# Patient Record
Sex: MALE | Race: WHITE | NOT HISPANIC OR LATINO | Employment: OTHER | URBAN - METROPOLITAN AREA
[De-identification: names, ages, dates, MRNs, and addresses within clinical notes are randomized per-mention and may not be internally consistent; named-entity substitution may affect disease eponyms.]

---

## 2017-12-06 ENCOUNTER — TELEPHONE (OUTPATIENT)
Dept: NEUROLOGY | Facility: CLINIC | Age: 67
End: 2017-12-06

## 2017-12-06 NOTE — TELEPHONE ENCOUNTER
Called patient to reschedule. Patient was scheduled on the wrong day. Patient expressed understanding

## 2018-01-24 ENCOUNTER — TELEPHONE (OUTPATIENT)
Dept: NEUROLOGY | Facility: CLINIC | Age: 68
End: 2018-01-24

## 2018-01-24 NOTE — TELEPHONE ENCOUNTER
----- Message from Kimberlee Cedillo sent at 1/24/2018  9:20 AM CST -----  Contact: Patient's wife, Sabra  Patient's wife is calling to see if patient's appointment on 1/25 could possibly get rescheduled for next month because the patient has the flu.  Please call to reschedule.  Call Back#[phone] or   Thanks

## 2018-03-10 PROBLEM — A41.9 SEPSIS SECONDARY TO UTI: Status: ACTIVE | Noted: 2018-03-10

## 2018-03-10 PROBLEM — N39.0 SEPSIS SECONDARY TO UTI: Status: ACTIVE | Noted: 2018-03-10

## 2018-03-11 PROBLEM — R10.31 RIGHT LOWER QUADRANT ABDOMINAL PAIN: Status: ACTIVE | Noted: 2018-03-11

## 2018-03-11 PROBLEM — R10.9 FLANK PAIN, ACUTE: Status: ACTIVE | Noted: 2018-03-11

## 2018-03-12 PROBLEM — R39.14 BENIGN PROSTATIC HYPERPLASIA WITH INCOMPLETE BLADDER EMPTYING: Status: ACTIVE | Noted: 2018-03-12

## 2018-03-12 PROBLEM — R33.8 ACUTE URINARY RETENTION: Status: ACTIVE | Noted: 2018-03-12

## 2018-03-12 PROBLEM — N40.1 BENIGN PROSTATIC HYPERPLASIA WITH INCOMPLETE BLADDER EMPTYING: Status: ACTIVE | Noted: 2018-03-12

## 2018-03-13 PROBLEM — N40.1 BENIGN PROSTATIC HYPERPLASIA WITH INCOMPLETE BLADDER EMPTYING: Status: ACTIVE | Noted: 2018-03-13

## 2018-03-13 PROBLEM — R33.8 ACUTE URINARY RETENTION: Status: ACTIVE | Noted: 2018-03-13

## 2018-03-13 PROBLEM — N41.0 ACUTE PROSTATITIS: Status: ACTIVE | Noted: 2018-03-13

## 2018-03-13 PROBLEM — R39.14 BENIGN PROSTATIC HYPERPLASIA WITH INCOMPLETE BLADDER EMPTYING: Status: ACTIVE | Noted: 2018-03-13

## 2018-03-27 ENCOUNTER — TELEPHONE (OUTPATIENT)
Dept: NEUROLOGY | Facility: CLINIC | Age: 68
End: 2018-03-27

## 2018-03-27 ENCOUNTER — OFFICE VISIT (OUTPATIENT)
Dept: NEUROLOGY | Facility: CLINIC | Age: 68
End: 2018-03-27
Payer: MEDICARE

## 2018-03-27 VITALS
HEART RATE: 73 BPM | WEIGHT: 265.88 LBS | SYSTOLIC BLOOD PRESSURE: 140 MMHG | RESPIRATION RATE: 18 BRPM | HEIGHT: 72 IN | DIASTOLIC BLOOD PRESSURE: 85 MMHG | BODY MASS INDEX: 36.01 KG/M2

## 2018-03-27 DIAGNOSIS — I10 ESSENTIAL HYPERTENSION: ICD-10-CM

## 2018-03-27 DIAGNOSIS — M54.81 OCCIPITAL NEURALGIA OF LEFT SIDE: ICD-10-CM

## 2018-03-27 DIAGNOSIS — G44.1 VASCULAR HEADACHE, NOT ELSEWHERE CLASSIFIED: ICD-10-CM

## 2018-03-27 DIAGNOSIS — E11.9 DIABETES MELLITUS, STABLE: ICD-10-CM

## 2018-03-27 DIAGNOSIS — Z95.1 H/O HEART BYPASS SURGERY: ICD-10-CM

## 2018-03-27 DIAGNOSIS — G43.019 INTRACTABLE MIGRAINE WITHOUT AURA AND WITHOUT STATUS MIGRAINOSUS: ICD-10-CM

## 2018-03-27 DIAGNOSIS — M47.812 SPONDYLOSIS WITHOUT MYELOPATHY OR RADICULOPATHY, CERVICAL REGION: ICD-10-CM

## 2018-03-27 DIAGNOSIS — Z95.1 STATUS POST CARDIAC REVASCULARIZATION WITH BYPASS AORTOCORONARY ANASTOMOSIS OF FIVE CORONARY VESSELS: ICD-10-CM

## 2018-03-27 PROCEDURE — 99205 OFFICE O/P NEW HI 60 MIN: CPT | Mod: S$GLB,,, | Performed by: PSYCHIATRY & NEUROLOGY

## 2018-03-27 PROCEDURE — 3079F DIAST BP 80-89 MM HG: CPT | Mod: CPTII,S$GLB,, | Performed by: PSYCHIATRY & NEUROLOGY

## 2018-03-27 PROCEDURE — 99999 PR PBB SHADOW E&M-EST. PATIENT-LVL III: CPT | Mod: PBBFAC,,, | Performed by: PSYCHIATRY & NEUROLOGY

## 2018-03-27 PROCEDURE — 99499 UNLISTED E&M SERVICE: CPT | Mod: S$GLB,,, | Performed by: PSYCHIATRY & NEUROLOGY

## 2018-03-27 PROCEDURE — 3077F SYST BP >= 140 MM HG: CPT | Mod: CPTII,S$GLB,, | Performed by: PSYCHIATRY & NEUROLOGY

## 2018-03-27 RX ORDER — METOPROLOL SUCCINATE 100 MG/1
100 TABLET, EXTENDED RELEASE ORAL
Status: ON HOLD | COMMUNITY
Start: 2017-10-24 | End: 2018-09-04 | Stop reason: CLARIF

## 2018-03-27 RX ORDER — FUROSEMIDE 40 MG/1
40 TABLET ORAL
Status: ON HOLD | COMMUNITY
Start: 2017-09-07 | End: 2019-06-01 | Stop reason: HOSPADM

## 2018-03-27 RX ORDER — AMLODIPINE AND BENAZEPRIL HYDROCHLORIDE 10; 40 MG/1; MG/1
1 CAPSULE ORAL
Status: ON HOLD | COMMUNITY
Start: 2017-10-07 | End: 2019-06-01 | Stop reason: HOSPADM

## 2018-03-27 RX ORDER — OMEPRAZOLE 40 MG/1
40 CAPSULE, DELAYED RELEASE ORAL
Status: ON HOLD | COMMUNITY
Start: 2017-10-19 | End: 2018-09-04 | Stop reason: CLARIF

## 2018-03-27 RX ORDER — PRAVASTATIN SODIUM 80 MG/1
80 TABLET ORAL
COMMUNITY
Start: 2017-10-19 | End: 2020-05-15 | Stop reason: SDUPTHER

## 2018-03-27 RX ORDER — PROMETHAZINE HYDROCHLORIDE 25 MG/1
25 TABLET ORAL
COMMUNITY
Start: 2017-10-19 | End: 2018-05-25 | Stop reason: ALTCHOICE

## 2018-03-27 RX ORDER — LAMOTRIGINE 25 MG/1
TABLET ORAL
Qty: 120 TABLET | Refills: 11 | Status: SHIPPED | OUTPATIENT
Start: 2018-03-27 | End: 2019-07-18

## 2018-03-27 RX ORDER — HYDROCODONE BITARTRATE AND ACETAMINOPHEN 10; 325 MG/1; MG/1
TABLET ORAL
Refills: 0 | Status: ON HOLD | COMMUNITY
Start: 2018-03-20 | End: 2019-06-01 | Stop reason: HOSPADM

## 2018-03-27 RX ORDER — ETODOLAC 400 MG/1
400 TABLET, FILM COATED ORAL
Status: ON HOLD | COMMUNITY
Start: 2017-10-07 | End: 2019-06-01 | Stop reason: HOSPADM

## 2018-03-27 NOTE — TELEPHONE ENCOUNTER
Called speciality pharmacy in regards to pts transdermal cream. All questions and concerns addressed.

## 2018-03-27 NOTE — TELEPHONE ENCOUNTER
----- Message from Kimberlee Cedillo sent at 3/27/2018  4:33 PM CDT -----  Contact: Edilma with [facility] in [place], [place]  Pharmacy needs the CESAR number to fill patient's prescription for:  KGCL ketamine 10%- gabapentin 6%- cyclobenzaprine 2%- lidocaine 4% in transdermal cream.  Call Back#[phone]  (they are there until 5)  Thanks

## 2018-03-27 NOTE — PROGRESS NOTES
Headache questionnaire    1. When did your Headaches start?    1999      2. Where are your headaches located?   Left side head      3. Your headache's characteristics:   Throbbing, Pounding      4. How long does the headache last?   days      5. How often does the headache occur?   weekly      6. Are your headaches preceded or accompanied by other symptoms? no   If yes, please describe.  Pt did not answer      7. Does the headache awaken you at night? yes   If so, how often? Pt did not answer        8. Please cornelio the word that best describes your headache's intensity:    severe      9. Using a scale of 1 through 10, with 0 = no pain and 10 = the worst pain:   What score is your headache now? 5   What score is your headache at its worst? 10   What score is your headache at its best? 2        10. Possible associated headache symptoms:  []  Sensitivity to light  [x] Dizziness  [] Nasal or sinus pressure/ pain   [] Sensitivity to noise  [] Vertigo  [] Problems with concentration  [] Sensitivity to smells  [] Ringing in ears  [] Problems with memory    [] Blurred vision  [] Irritability  [] Problems with task completion   [] Double vision  [] Anger  [x]  Problems with relaxation  [] Loss of appetite  [] Anxiety  [x] Neck tightness, Neck pain  [x] Nausea   [] Nasal congestion  [x] Vomiting         11. Headache improving factors:  [] Sleep  [] Heat  [] Darkness  [x] Ice  [] Local pressure [] Menses (period)  [] Massage   [x] Medications:        12. Headache worsening factors:   [] Fatigue [] Sneezing  [] Changes in Weather  [] Light [] Bending Over [x] Stress  [] Noise [] Ovulation  [] Multiple Sclerosis Flare-Up  [] Smells  [] Menses  [] Food   [] Coughing [] Alcohol      13. Number of caffeinated drinks per day: pt did not answer      14. Number of diet drinks per day:  3      15. Have you seen any other [facility] within the last 3 years?  No    Please Check any Medications Tried for Headache    AED  Neuromodulators  MAOIs  Ergot Alkaloids    Acetazolamide (Diamox) [] Phenelzine (Nardil) [] Dihydroergotamine (Migranal) []   Carbamazepine (Tegretol) [] Tranylcypromine (Parnate) [] Ergotamine (Ergomar) []   Gabapentin (Neurontin) [x] Antihistamine/Serotonergic  Triptans    Lacosamide (Vimpat) [] Cyproheptadine (Periactin) [] Almotriptan (Axert) [x]   Lamotrigine (Lamictal) [] Antihypertensives  Eletriptan (Relpax) [x]   Levatiracetam (Keppra) [] Atenolol (Tenormin) [] Frovatriptan (Frova) []   Oxcarbazepine (Trileptal) [] Bisoprostol (Zebeta) [] Naratriptan (Amerge) []   Phenobarbital [] Candesartan (Atacand) [] Rizatriptan (Maxalt) [x]   Phenytoin (Dilantin) [] Diltiazem (Cardizem) [] Sumatriptan (Imitrex) [x]   Pregabalin (Lyrica) [x] Lisinopril (Prinivil, Zestril) [] Zolmitriptan (Zomig) []   Primidone (Mysoline) [] Metoprolol (Toprol) [x] Combo Abortives    Tiagabine (Gabatril) [] Nadolol (Corgard) [] Butalbital and Acetaminophen (Bupap) []   Topiramate (Topamax)  (Trokendi) [x] Nicardipine (Cardene) []     Vigabatrin (Sabril) [] Nimodipine (Nimotop) [] Butalbital, Acetaminophen, and caffeine (Fioricet) []   Valproic Acid (Depakote) (Divalproex Sodium) [] Propranolol (Inderal) [x]     Zonisamide (Zonegran) [] Telmisartan (Micardis) [] Butalbital, Aspirin, and caffeine (Fiorinal) []   Benzodiazepines  Timolol (Blocadren) []     Alprazolam (Xanax) [x] Verapamil (Calan, Verelan) [] Butalbital, Caffeine, Acetaminophen, and Codeine (Fioricet with Codeine) []   Diazepam (Valium) [x] NSAIDs      Lorazepam (Ativan) [x] Acetaminophen (Tylenol) []     Clonazepam (Klonopin) [] Acetylsalicylic Acid (Aspirin) [] Butalbital, Caffeine, Aspirin, and Codeine  (Fiorinal with Codeine) []   Antidepressants  Diclofenac (Cambia) []     Amitriptyline (Elavil) [] Ibuprofen (Motrin) []     Desipramine (Norpramin) [] Indomethacin (Indocin) [x] Aspirin, Caffeine, and Acetaminophen (Excedrin) (Goodys) [x]   Doxepin (Sinequan) []  Ketoprofen (Orudis) []     Fluoxetine (Prozac) [x] Ketorolac (Toradol) [] Acetaminophen, Dichloralphenazone, and Isometheptene (Midrin) []   Imipramine (Tofranil) [] Naproxen (Anaprox) (Aleve) []     Nortriptyline (Pamelor) [] Meclofenamic Acid (Meclomen) []     Venlafaxine (Effexor) [x] Meloxicam (Mobic) [] Aspirin, Salicylamide, and Caffeine (BC Powder) []

## 2018-03-27 NOTE — LETTER
March 27, 2018      Yann Cardona MD at [facility] [place]  [address] [facility] [address] [place] [address]  Phone: [phone]  Fax: [phone]          Patient: William Jackson Jr.   MR Number: [id number]   YOB: 1950   Date of Visit: 3/27/2018       Dear Dr. Yann Cardona:    Thank you for referring William Jackson to me for evaluation. Attached you will find relevant portions of my assessment and plan of care.    If you have questions, please do not hesitate to call me. I look forward to following William Jackson along with you.    Sincerely,    Garima Wilson MD    Enclosure  CC:  No Recipients    If you would like to receive this communication electronically, please contact [email or web address] or [phone] to request more information on [employer] access.    For providers and/or their staff who would like to refer a patient to [facility], please contact us through our one-stop-shop provider referral line, [facility], at [phone].    If you feel you have received this communication in error or would no longer like to receive these types of communications, please e-mail [email or web address]

## 2018-03-27 NOTE — PROGRESS NOTES
Subjective:       Patient ID: William Jackson Jr. is a 67 y.o. male.    Chief Complaint: Migraine    HPI  The patient is a pleasant 66 y/o male presenting with chief complaint of headache. He comes accompanied by his wife which contributes to the history. 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 He has positive family of migraine affecting his father and sister. He describes a strictly unilateral left sided pain affecting the occipital, and temporal region, throbbing and pounding in nature, associated with photophobia, nausea and vomiting with escalations. He denies autonomic symptoms. The duration is days and the frequency is about 2 times per week. He takes Lortab with Phenergan and goes to sleep. Occasionally, he cannot manage, he goes to the ED and receives Demerol/Phenergan and goes to sleep for 10 to 12 hours after which is improved. In the past he has tried multiple medications which are ineffective and the majority of cases associated with side effects.   PREVENTIVES tried:  Botox (2 treatments, made the headache worse)  Toprol XL  Inderal  Lexapro  Effexor XR  Elavil  Bupropion  Namenda  Cymbalta  Verapamil  Depakote  Topamax  Lyrica  Gabapentin  Norflex  ACUTE MEDICATIONS tried:  Imitrex  Relpax  Maxalt  Fioricet  Ketorolac  Indocin     Please see details of headache characteristics below.  Headache questionnaire     1. When did your Headaches start?               1999        2. Where are your headaches located?              Left side head        3. Your headache's characteristics:              Throbbing, Pounding        4. How long does the headache last?              days        5. How often does the headache occur?              weekly        6. Are your headaches preceded or accompanied by other symptoms? no              If yes, please describe.  Pt did not answer        7. Does the headache awaken you at night? yes               If so, how often? Pt did not answer           8. Please cornelio the word that best describes your headache's intensity:               severe        9. Using a scale of 1 through 10, with 0 = no pain and 10 = the worst pain:              What score is your headache now? 5              What score is your headache at its worst? 10              What score is your headache at its best? 2           10. Possible associated headache symptoms:  []  Sensitivity to light                  [x] Dizziness                    [] Nasal or sinus pressure/ pain              [] Sensitivity to noise                 [] Vertigo                        [] Problems with concentration  [] Sensitivity to smells   [] Ringing in ears           [] Problems with memory                        [] Blurred vision             [] Irritability                     [] Problems with task completion   [] Double vision             [] Anger              [x]  Problems with relaxation  [] Loss of appetite                     [] Anxiety                       [x] Neck tightness, Neck pain  [x] Nausea                                   [] Nasal congestion  [x] Vomiting                                         11. Headache improving factors:  [] Sleep              [] Heat  [] Darkness                    [x] Ice  [] Local pressure           [] Menses (period)  [] Massage                    [x] Medications:          12. Headache worsening factors:   [] Fatigue           [] Sneezing                    [] Changes in Weather  [] Light   [] Bending Over [x] Stress  [] Noise  [] Ovulation                    [] Multiple Sclerosis Flare-Up  [] Smells            [] Menses                      [] Food   [] Coughing        [] Alcohol        13. Number of caffeinated drinks per day: pt did not answer        14. Number of diet drinks per day:  3  Review of Systems   Constitutional: Negative for activity change, appetite change, chills, fatigue and fever.   HENT: Negative for  congestion, dental problem, ear pain, hearing loss, sinus pressure, tinnitus, trouble swallowing and voice change.    Eyes: Negative for photophobia, pain and visual disturbance.   Respiratory: Negative for cough, chest tightness and shortness of breath.    Cardiovascular: Negative for chest pain, palpitations and leg swelling.   Gastrointestinal: Positive for nausea. Negative for abdominal pain, blood in stool, constipation, diarrhea and vomiting.   Endocrine: Negative for cold intolerance and heat intolerance.   Genitourinary: Negative for difficulty urinating, dysuria and urgency.   Musculoskeletal: Negative for arthralgias, back pain, gait problem, myalgias, neck pain and neck stiffness.   Skin: Negative for color change, pallor and rash.   Neurological: Positive for headaches. Negative for dizziness, tremors, seizures, syncope, facial asymmetry, speech difficulty, weakness, light-headedness and numbness.   Hematological: Negative for adenopathy. Does not bruise/bleed easily.   Psychiatric/Behavioral: Negative for agitation, behavioral problems, confusion, decreased concentration, self-injury, sleep disturbance and suicidal ideas. The patient is not nervous/anxious.                Past Medical History:   Diagnosis Date    Anticoagulant long-term use     Arthritis     Claustrophobia     Coronary artery disease     Diabetes mellitus     H/O heart bypass surgery     Hypertension     Migraine headache     Sleep apnea      Past Surgical History:   Procedure Laterality Date    APPENDECTOMY      CARDIAC SURGERY      COLONOSCOPY N/A 5/12/2016    Procedure: COLONOSCOPY;  Surgeon: Larry Ortega MD;  Location: [facility];  Service: Endoscopy;  Laterality: N/A;    GASTRECTOMY      JOINT REPLACEMENT      knee replacement Bilateral     LAPAROSCOPIC GASTRIC BANDING       Family History   Problem Relation Age of Onset    Kidney disease Mother     Stroke Father      Social History     Social History     Marital status:      Spouse name: N/A    Number of children: N/A    Years of education: N/A     Occupational History    retired-heavy equipment      Social History Main Topics    Smoking status: Never Smoker    Smokeless tobacco: Never Used    Alcohol use No    Drug use: No    Sexual activity: Yes     Partners: Female     Other Topics Concern    Not on file     Social History Narrative         Review of patient's allergies indicates:   Allergen Reactions    Compazine [prochlorperazine] Itching    Indocin [indomethacin] Rash       Current Outpatient Prescriptions:     amlodipine-benazepril (LOTREL) 10-40 mg per capsule, Take 1 capsule by mouth., Disp: , Rfl:     aspirin 81 MG Chew, Take 325 mg by mouth once daily. , Disp: , Rfl:     b complex vitamins tablet, Take 1 tablet by mouth once daily., Disp: , Rfl:     ciprofloxacin HCl (CIPRO) 500 MG tablet, Take 1 tablet (500 mg total) by mouth 2 (two) times daily., Disp: 18 tablet, Rfl: 0    etodolac (LODINE) 400 MG tablet, Take 400 mg by mouth., Disp: , Rfl:     furosemide (LASIX) 40 MG tablet, Take 40 mg by mouth., Disp: , Rfl:     hydrocodone-acetaminophen 7.5-325mg (NORCO) 7.5-325 mg per tablet, Take 1 tablet by mouth every 6 (six) hours as needed for Pain., Disp: , Rfl:     metoprolol succinate (TOPROL-XL) 100 MG 24 hr tablet, Take 100 mg by mouth once daily., Disp: , Rfl:     metoprolol succinate (TOPROL-XL) 100 MG 24 hr tablet, Take 100 mg by mouth., Disp: , Rfl:     omeprazole (PRILOSEC) 40 MG capsule, Take 40 mg by mouth., Disp: , Rfl:     pravastatin (PRAVACHOL) 80 MG tablet, Take 80 mg by mouth., Disp: , Rfl:     promethazine (PHENERGAN) 25 MG tablet, Take 25 mg by mouth., Disp: , Rfl:     psyllium (METAMUCIL) packet, Take 1 packet by mouth once daily., Disp: , Rfl:     tamsulosin (FLOMAX) 0.4 mg Cp24, Take 1 capsule (0.4 mg total) by mouth once daily., Disp: 30 capsule, Rfl: 11    testosterone cypionate (DEPOTESTOTERONE  CYPIONATE) 100 mg/mL injection, Inject 200 mg into the muscle every 14 (fourteen) days., Disp: , Rfl:     amlodipine-benazepril (LOTREL) 10-40 mg per capsule, Take 1 capsule by mouth once daily., Disp: , Rfl:     etodolac (LODINE XL) 400 MG 24 hr tablet, Take 400 mg by mouth once daily., Disp: , Rfl:     furosemide (LASIX) 40 MG tablet, Take 40 mg by mouth once daily. , Disp: , Rfl:     hydrocodone-acetaminophen 10-325mg (NORCO)  mg Tab, TK 1 T PO  Q 4 H PRN P, Disp: , Rfl: 0    omeprazole (PRILOSEC) 40 MG capsule, Take 40 mg by mouth once daily., Disp: , Rfl:     pravastatin (PRAVACHOL) 80 MG tablet, Take 80 mg by mouth once daily., Disp: , Rfl:       Objective:      Vitals:    03/27/18 0800   BP: (!) 140/85   Pulse: 73   Resp: 18     Body mass index is 36.06 kg/m².    Physical Exam    Constitutional:     He appears well-developed and well-nourished. He is well groomed  HENT:    Head: Atraumatic, oral and nasal mucosa intact  Eyes: Conjunctivae and EOM are normal. Pupils are equal, round, and reactive to light OU. Smaller left palpebral fissure.  Neck: Neck supple. No thyromegaly present  Cardiovascular: Normal rate and normal heart sounds  No murmur heard  Pulmonary/Chest: Effort normal and breath sounds normal  Musculoskeletal: Poor posture with stooped head forward  Skin: Skin is warm and dry  Psychiatric: Normal mood and affect     Neuro exam:    Mental status:  Awake, attentive, Alert, oriented to self, place, year and month  Language function is intact    Cranial Nerves:  Smell was not formally evaluated  Cranial Nerves II - XII: intact  Pursuits were smooth, normal saccades, no nystagmus OU  Funduscopic exam - disc were flat and pink, no exudates or hemorrhages OU  Motor - facial movement was symmetrical and normal     Palate moved well and was symmetrical with normal palatal and oral sensation  Tongue movements were full    Coordination:     Rapid alternating movements and rapid finger tapping -  normal bilaterally  Finger to nose - normal and symmetric bilaterally   Arm roll - smooth and symmetric   No intentional or positional tremor.     Motor:  Normal muscle bulk and symmetry. No fasciculations were noted    No pronator drift  Strength of shoulder abduction, elbow flexion, wrist extension, elbow extension, finger flexion and hand intrinsics were 5/5 bilaterally    Strength of hip flexion, knee extension, knee flexion, ankle dorsiflexion, ankle plantarflexion were 5/5 bilaterally     Reflexes:  Tendon reflexes were 2 + at biceps, triceps, brachioradialis, patellar, and Achilles bilaterally  On plantar stimulation toes were down going bilaterally  No clonus was noted     Sensory: Intact to light touch, pin prick in all extremities.    Gait: Romberg absent. Normal gait. Good tandem    Review of Data:  Lab Results   Component Value Date     03/11/2016     03/13/2018    K 3.8 03/13/2018     03/13/2018    CO2 22 03/13/2018    BUN 11 03/13/2018    CREATININE 1.00 03/13/2018     (H) 03/13/2018    AST 20 03/13/2018    AST 43 05/11/2016    ALT 24 03/13/2018    ALBUMIN 3.7 03/13/2018    PROT 7.3 03/13/2018    BILITOT 0.6 03/13/2018       Lab Results   Component Value Date    WBC 8.75 03/13/2018    HGB 15.5 03/13/2018    HCT 46.7 03/13/2018    MCV 86 03/13/2018     03/13/2018               Assessment and Plan   The patient has suffered with chronic intractable headaches since 1999. He has seen at least 7 specialists but has been unable to find relief. He recalls responding to a left JUANY block. He has failed multiple medications (see above) as well as 2 rounds of Botox and cervical RFA. He has had at least 3 head CT, all negative.  He meets criteria for left sided chronic Occipital Neuralgia as well as Migraine without Aura. I have discussed the side effects of the medications prescribed and the patient acknowledges understanding.  He described a strong throbbing elements associated  with the occipital pain. Will get TCD to assess velocities with focus on the left side, ideally in the midst of an attack  Start Lamictal for prevention titrating slowly to an initial goal of 50 mg BID.  Start Compounded cream, KGCL to be applied to the affected area TID  Acute treatment is limited because of cardiac history. Will try Intranasal lidocaine to abort the attack.  Will block his left occipital nerve when in the midst of an attack  Will refer to Dr. Friedman for consideration for cryolysis of the left occipital nerve  Other considerations:  Sphenopalatine Ganglion block  Occipital Nerve Neuromodulation  CGRP monoclonal antibodies when available later this year    Counseling:  More than 50% of the 60 minute encounter was spent face to face counseling the patient regarding current status and future plan of care as well as side of the medications. All questions were answered to patient's satisfaction          Allan Wilson M.D  Medical Director, Headache and Facial Pain  [facility]

## 2018-03-27 NOTE — PROGRESS NOTES
Patient, William Jackson Jr. (MRN #[id number]), presented with a recorded BMI of 36.06 kg/m^2 and a documented comorbidity(s):  - Hypertension  to which the severe obesity is a contributing factor. This is consistent with the definition of severe obesity (BMI 35.0-35.9) with comorbidity (ICD-10 E66.01, Z68.35). The patient's severe obesity was monitored, evaluated, addressed and/or treated. This addendum to the medical record is made on 03/27/2018.

## 2018-03-28 ENCOUNTER — TELEPHONE (OUTPATIENT)
Dept: NEUROLOGY | Facility: CLINIC | Age: 68
End: 2018-03-28

## 2018-03-28 NOTE — TELEPHONE ENCOUNTER
----- Message from Presley Shipman sent at 3/28/2018 11:12 AM CDT -----  Contact: Wife/Sabra Montaño called in regarding the attached patient ().  Sabra stated that some pharmacy in [place] called them and stated they had a Rx for patient for some type of spray?  Sabra was confused about this because she did not remember hearing anything about the spray.  Sabra then stated that this pharmacy asked for $30 up front for postage??      Sabra's call back number is [phone] or patients is at [phone]

## 2018-03-29 ENCOUNTER — TELEPHONE (OUTPATIENT)
Dept: NEUROLOGY | Facility: CLINIC | Age: 68
End: 2018-03-29

## 2018-03-29 DIAGNOSIS — I67.2 CEREBRAL ATHEROSCLEROSIS: ICD-10-CM

## 2018-03-29 DIAGNOSIS — I73.9 VASOSPASM: Primary | ICD-10-CM

## 2018-03-29 NOTE — TELEPHONE ENCOUNTER
Called pt's wife to inform her that Transcranial Doppler was scheduled for Mr Thomas on April 9,2018 at 1:15pm. The Transcranial Doppler will be at [facility] [place] [facility] in Radiology. Pt did not answer, left voicemail

## 2018-03-29 NOTE — TELEPHONE ENCOUNTER
Called pt and informed him to disregard last messgae about the Transcranial Doppler being scheduled. I am working on scheduling the correct appointment and location. Pt verbalized understanding.

## 2018-03-29 NOTE — TELEPHONE ENCOUNTER
----- Message from RT Felice sent at 3/29/2018 10:31 AM CDT -----  Contact: pt    Called pod, pt , returned missed call, thanks.

## 2018-04-10 ENCOUNTER — TELEPHONE (OUTPATIENT)
Dept: NEUROLOGY | Facility: CLINIC | Age: 68
End: 2018-04-10

## 2018-04-10 RX ORDER — KETOROLAC TROMETHAMINE 30 MG/ML
60 INJECTION, SOLUTION INTRAMUSCULAR; INTRAVENOUS
Status: DISCONTINUED | OUTPATIENT
Start: 2018-04-11 | End: 2019-06-01 | Stop reason: HOSPADM

## 2018-04-10 NOTE — TELEPHONE ENCOUNTER
----- Message from Presley Shipman sent at 4/10/2018  1:33 PM CDT -----  Contact: Wife/Sabra  Unsuccessful call placed to office.  Sabra called in regarding the attached patient () and stated she was returning call to office about patients procedure that they are trying to schedule.  Sabra asked for call back after 4:30pm at [phone]

## 2018-04-10 NOTE — TELEPHONE ENCOUNTER
----- Message from Florinda Epstein sent at 4/10/2018  1:02 PM CDT -----  Contact: Sabra Manuel (Spouse)  Sabra is calling in regards to speaking to a nurse. Sabra can be reached at [phone]

## 2018-04-10 NOTE — TELEPHONE ENCOUNTER
Called pt and informed him to come into clinic tomorrow for a Nurse Visit for an IM injection. Pt confirmed he will be here. Pt verbalized understanding.

## 2018-04-11 ENCOUNTER — TELEPHONE (OUTPATIENT)
Dept: NEUROLOGY | Facility: CLINIC | Age: 68
End: 2018-04-11

## 2018-04-11 ENCOUNTER — CLINICAL SUPPORT (OUTPATIENT)
Dept: NEUROLOGY | Facility: CLINIC | Age: 68
End: 2018-04-11
Payer: MEDICARE

## 2018-04-11 VITALS
HEART RATE: 84 BPM | SYSTOLIC BLOOD PRESSURE: 170 MMHG | BODY MASS INDEX: 35.82 KG/M2 | DIASTOLIC BLOOD PRESSURE: 100 MMHG | WEIGHT: 264.44 LBS | RESPIRATION RATE: 18 BRPM | HEIGHT: 72 IN

## 2018-04-11 DIAGNOSIS — R11.0 NAUSEA: Primary | ICD-10-CM

## 2018-04-11 DIAGNOSIS — G43.009 MIGRAINE WITHOUT AURA AND WITHOUT STATUS MIGRAINOSUS, NOT INTRACTABLE: ICD-10-CM

## 2018-04-11 PROCEDURE — 96372 THER/PROPH/DIAG INJ SC/IM: CPT | Mod: S$GLB,,, | Performed by: PSYCHIATRY & NEUROLOGY

## 2018-04-11 PROCEDURE — 99999 PR PBB SHADOW E&M-EST. PATIENT-LVL IV: CPT | Mod: PBBFAC,,, | Performed by: PSYCHIATRY & NEUROLOGY

## 2018-04-11 RX ORDER — ONDANSETRON 2 MG/ML
8 INJECTION INTRAMUSCULAR; INTRAVENOUS
Status: COMPLETED | OUTPATIENT
Start: 2018-04-11 | End: 2018-04-11

## 2018-04-11 RX ORDER — KETOROLAC TROMETHAMINE 30 MG/ML
60 INJECTION, SOLUTION INTRAMUSCULAR; INTRAVENOUS
Status: COMPLETED | OUTPATIENT
Start: 2018-04-11 | End: 2018-04-11

## 2018-04-11 RX ADMIN — KETOROLAC TROMETHAMINE 60 MG: 30 INJECTION, SOLUTION INTRAMUSCULAR; INTRAVENOUS at 03:04

## 2018-04-11 RX ADMIN — ONDANSETRON 8 MG: 2 INJECTION INTRAMUSCULAR; INTRAVENOUS at 03:04

## 2018-04-11 NOTE — TELEPHONE ENCOUNTER
----- Message from Stacy Moscoso sent at 4/11/2018  2:23 PM CDT -----  Contact: [phone]  [place], may be a few min late [phone]

## 2018-04-11 NOTE — TELEPHONE ENCOUNTER
Returned call and spoke with patient's wife, Sabra. Informed her it was okay if the patient ran late for the nurse's visit. Patient verbalized understanding.

## 2018-04-11 NOTE — PROGRESS NOTES
Toradol 60 mg IM given to patient in Left Upper Outer Quad Gluteus. Zofran 8 mg IM given in Right Upper Outer Quad Gluteus. Patient tolerated well. No complaints of shortness of breath or chest pain.

## 2018-04-12 ENCOUNTER — TELEPHONE (OUTPATIENT)
Dept: NEUROLOGY | Facility: CLINIC | Age: 68
End: 2018-04-12

## 2018-04-12 RX ORDER — METHYLPREDNISOLONE 4 MG/1
TABLET ORAL
Qty: 1 PACKAGE | Refills: 0 | Status: SHIPPED | OUTPATIENT
Start: 2018-04-12 | End: 2018-05-03

## 2018-04-12 NOTE — TELEPHONE ENCOUNTER
----- Message from Kimberlee Cedillo sent at 4/12/2018 12:21 PM CDT -----  Contact: Patient's wife, Sabra  Patient's wife is calling to speak with one of the nurses regarding the patient.  The injection didn't work the patient still has the migraines.  Please call to let them know what the next step will be.  Call Back#[phone]  Or  [phone]  Thanks

## 2018-04-12 NOTE — TELEPHONE ENCOUNTER
Called pt's spouse and informed her of medication that was sent to pharmacy for pt. Spouse verbalized understanding.

## 2018-05-15 ENCOUNTER — TELEPHONE (OUTPATIENT)
Dept: NEUROLOGY | Facility: CLINIC | Age: 68
End: 2018-05-15

## 2018-05-18 ENCOUNTER — TELEPHONE (OUTPATIENT)
Dept: NEUROLOGY | Facility: CLINIC | Age: 68
End: 2018-05-18

## 2018-05-18 NOTE — TELEPHONE ENCOUNTER
----- Message from Lizzeth Rivers sent at 5/18/2018 12:08 PM CDT -----  Contact: Sabra Mondragonsusanne (Spouse)  Type: Needs Medical Advice    Who Called: Sabra Manuel (Spouse)  Symptoms (please be specific):  Sever headaches  How long has patient had these symptoms:  NA  Pharmacy name and phone #:  NA  Best Call Back Number: [phone] or   Additional Information: Calling to speak with the Nurse to find out if the Doctor is still trying to schedule a procedure with a Doctor at [facility]. Please advise.

## 2018-05-18 NOTE — TELEPHONE ENCOUNTER
Called spouse in regards to pt and informed her that we have now scheduled the Transcranial Doppler for pt and also scheduled a follow up appointment in regards to increased headaches. Spouse verbalized understanding.

## 2018-05-21 ENCOUNTER — TELEPHONE (OUTPATIENT)
Dept: NEUROLOGY | Facility: CLINIC | Age: 68
End: 2018-05-21

## 2018-05-21 NOTE — TELEPHONE ENCOUNTER
----- Message from Tamika Myles sent at 5/21/2018 12:46 PM CDT -----  Contact: spouse, Sabra  Requesting call back regarding patient's appt. Please call [phone]

## 2018-05-22 ENCOUNTER — HOSPITAL ENCOUNTER (OUTPATIENT)
Dept: RADIOLOGY | Facility: HOSPITAL | Age: 68
Discharge: HOME | End: 2018-05-22
Attending: PSYCHIATRY & NEUROLOGY
Payer: MEDICARE

## 2018-05-22 DIAGNOSIS — I67.2 CEREBRAL ATHEROSCLEROSIS: ICD-10-CM

## 2018-05-22 DIAGNOSIS — I73.9 VASOSPASM: ICD-10-CM

## 2018-05-23 ENCOUNTER — OFFICE VISIT (OUTPATIENT)
Dept: NEUROLOGY | Facility: CLINIC | Age: 68
End: 2018-05-23
Payer: MEDICARE

## 2018-05-23 VITALS
WEIGHT: 265.56 LBS | RESPIRATION RATE: 18 BRPM | BODY MASS INDEX: 35.97 KG/M2 | DIASTOLIC BLOOD PRESSURE: 88 MMHG | SYSTOLIC BLOOD PRESSURE: 136 MMHG | HEIGHT: 72 IN | HEART RATE: 65 BPM

## 2018-05-23 DIAGNOSIS — Z95.1 H/O HEART BYPASS SURGERY: ICD-10-CM

## 2018-05-23 DIAGNOSIS — M54.81 OCCIPITAL NEURALGIA OF LEFT SIDE: Primary | ICD-10-CM

## 2018-05-23 DIAGNOSIS — I67.2 CEREBRAL ATHEROSCLEROSIS: ICD-10-CM

## 2018-05-23 DIAGNOSIS — I10 ESSENTIAL HYPERTENSION: ICD-10-CM

## 2018-05-23 DIAGNOSIS — I73.9 VASOSPASM: ICD-10-CM

## 2018-05-23 DIAGNOSIS — G43.019 INTRACTABLE MIGRAINE WITHOUT AURA AND WITHOUT STATUS MIGRAINOSUS: ICD-10-CM

## 2018-05-23 PROCEDURE — 3079F DIAST BP 80-89 MM HG: CPT | Mod: CPTII,S$GLB,, | Performed by: PSYCHIATRY & NEUROLOGY

## 2018-05-23 PROCEDURE — 99214 OFFICE O/P EST MOD 30 MIN: CPT | Mod: S$GLB,,, | Performed by: PSYCHIATRY & NEUROLOGY

## 2018-05-23 PROCEDURE — 99999 PR PBB SHADOW E&M-EST. PATIENT-LVL III: CPT | Mod: PBBFAC,,, | Performed by: PSYCHIATRY & NEUROLOGY

## 2018-05-23 PROCEDURE — 3075F SYST BP GE 130 - 139MM HG: CPT | Mod: CPTII,S$GLB,, | Performed by: PSYCHIATRY & NEUROLOGY

## 2018-05-23 NOTE — PROGRESS NOTES
"Subjective:       Patient ID: William Jackson Jr. is a 67 y.o. male.    Chief Complaint: Migraine  INTERVAL HISTORY  The patient comes for follow up. He started taking Lamictal after the last visit but stopped because he "could not get out of bed." He fell extremely sedated and stopped taking them. He never reached his initial goal of 50 mg BID. Yesterday he had the TCD, we still do not have the report. His wife corroborates the history that he provides. In addition he inquires about Iomovig and how to come off Lortab.  HPI  The patient is a pleasant 68 y/o male presenting with chief complaint of headache. He comes accompanied by his wife which contributes to the history. 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 He has positive family of migraine affecting his father and sister. He describes a strictly unilateral left sided pain affecting the occipital, and temporal region, throbbing and pounding in nature, associated with photophobia, nausea and vomiting with escalations. He denies autonomic symptoms. The duration is days and the frequency is about 2 times per week. He takes Lortab with Phenergan and goes to sleep. Occasionally, he cannot manage, he goes to the ED and receives Demerol/Phenergan and goes to sleep for 10 to 12 hours after which is improved. In the past he has tried multiple medications which are ineffective and the majority of cases associated with side effects.   PREVENTIVES tried:  Botox (2 treatments, made the headache worse)  Toprol XL  Inderal  Lexapro  Effexor XR  Elavil  Bupropion  Namenda  Cymbalta  Verapamil  Depakote  Topamax  Lyrica  Gabapentin  Norflex  ACUTE MEDICATIONS tried:  Imitrex  Relpax  Maxalt  Fioricet  Ketorolac  Indocin     Please see details of headache characteristics below.  Headache questionnaire     1. When did your Headaches start?               1999        2. Where are your headaches located?     "          Left side head        3. Your headache's characteristics:              Throbbing, Pounding        4. How long does the headache last?              days        5. How often does the headache occur?              weekly        6. Are your headaches preceded or accompanied by other symptoms? no              If yes, please describe.  Pt did not answer        7. Does the headache awaken you at night? yes              If so, how often? Pt did not answer           8. Please cornelio the word that best describes your headache's intensity:               severe        9. Using a scale of 1 through 10, with 0 = no pain and 10 = the worst pain:              What score is your headache now? 5              What score is your headache at its worst? 10              What score is your headache at its best? 2           10. Possible associated headache symptoms:  []  Sensitivity to light                  [x] Dizziness                    [] Nasal or sinus pressure/ pain              [] Sensitivity to noise                 [] Vertigo                        [] Problems with concentration  [] Sensitivity to smells   [] Ringing in ears           [] Problems with memory                        [] Blurred vision             [] Irritability                     [] Problems with task completion   [] Double vision             [] Anger              [x]  Problems with relaxation  [] Loss of appetite                     [] Anxiety                       [x] Neck tightness, Neck pain  [x] Nausea                                   [] Nasal congestion  [x] Vomiting                                         11. Headache improving factors:  [] Sleep              [] Heat  [] Darkness                    [x] Ice  [] Local pressure           [] Menses (period)  [] Massage                    [x] Medications:          12. Headache worsening factors:   [] Fatigue           [] Sneezing                    [] Changes in Weather  [] Light   [] Bending Over [x]  Stress  [] Noise  [] Ovulation                    [] Multiple Sclerosis Flare-Up  [] Smells            [] Menses                      [] Food   [] Coughing        [] Alcohol        13. Number of caffeinated drinks per day: pt did not answer        14. Number of diet drinks per day:  3  Review of Systems   Constitutional: Negative for activity change, appetite change, chills, fatigue and fever.   HENT: Negative for congestion, dental problem, ear pain, hearing loss, sinus pressure, tinnitus, trouble swallowing and voice change.    Eyes: Negative for photophobia, pain and visual disturbance.   Respiratory: Negative for cough, chest tightness and shortness of breath.    Cardiovascular: Negative for chest pain, palpitations and leg swelling.   Gastrointestinal: Positive for nausea. Negative for abdominal pain, blood in stool, constipation, diarrhea and vomiting.   Endocrine: Negative for cold intolerance and heat intolerance.   Genitourinary: Negative for difficulty urinating, dysuria and urgency.   Musculoskeletal: Negative for arthralgias, back pain, gait problem, myalgias, neck pain and neck stiffness.   Skin: Negative for color change, pallor and rash.   Neurological: Positive for headaches. Negative for dizziness, tremors, seizures, syncope, facial asymmetry, speech difficulty, weakness, light-headedness and numbness.   Hematological: Negative for adenopathy. Does not bruise/bleed easily.   Psychiatric/Behavioral: Negative for agitation, behavioral problems, confusion, decreased concentration, self-injury, sleep disturbance and suicidal ideas. The patient is not nervous/anxious.                Past Medical History:   Diagnosis Date    Anticoagulant long-term use     Arthritis     Claustrophobia     Coronary artery disease     Diabetes mellitus     H/O heart bypass surgery     Hypertension     Migraine headache     Sleep apnea      Past Surgical History:   Procedure Laterality Date    APPENDECTOMY       CARDIAC SURGERY      COLONOSCOPY N/A 5/12/2016    Procedure: COLONOSCOPY;  Surgeon: Larry Ortega MD;  Location: [facility];  Service: Endoscopy;  Laterality: N/A;    GASTRECTOMY      JOINT REPLACEMENT      knee replacement Bilateral     LAPAROSCOPIC GASTRIC BANDING       Family History   Problem Relation Age of Onset    Kidney disease Mother     Stroke Father      Social History     Social History    Marital status:      Spouse name: N/A    Number of children: N/A    Years of education: N/A     Occupational History    retired-heavy equipment      Social History Main Topics    Smoking status: Never Smoker    Smokeless tobacco: Never Used    Alcohol use No    Drug use: No    Sexual activity: Yes     Partners: Female     Other Topics Concern    Not on file     Social History Narrative         Review of patient's allergies indicates:   Allergen Reactions    Compazine [prochlorperazine] Itching    Indocin [indomethacin] Rash       Current Outpatient Prescriptions:     amlodipine-benazepril (LOTREL) 10-40 mg per capsule, Take 1 capsule by mouth., Disp: , Rfl:     aspirin 81 MG Chew, Take 325 mg by mouth once daily. , Disp: , Rfl:     b complex vitamins tablet, Take 1 tablet by mouth once daily., Disp: , Rfl:     ciprofloxacin HCl (CIPRO) 500 MG tablet, Take 1 tablet (500 mg total) by mouth 2 (two) times daily., Disp: 18 tablet, Rfl: 0    etodolac (LODINE) 400 MG tablet, Take 400 mg by mouth., Disp: , Rfl:     furosemide (LASIX) 40 MG tablet, Take 40 mg by mouth., Disp: , Rfl:     hydrocodone-acetaminophen 7.5-325mg (NORCO) 7.5-325 mg per tablet, Take 1 tablet by mouth every 6 (six) hours as needed for Pain., Disp: , Rfl:     metoprolol succinate (TOPROL-XL) 100 MG 24 hr tablet, Take 100 mg by mouth once daily., Disp: , Rfl:     metoprolol succinate (TOPROL-XL) 100 MG 24 hr tablet, Take 100 mg by mouth., Disp: , Rfl:     omeprazole (PRILOSEC) 40 MG capsule, Take 40 mg by  mouth., Disp: , Rfl:     pravastatin (PRAVACHOL) 80 MG tablet, Take 80 mg by mouth., Disp: , Rfl:     promethazine (PHENERGAN) 25 MG tablet, Take 25 mg by mouth., Disp: , Rfl:     psyllium (METAMUCIL) packet, Take 1 packet by mouth once daily., Disp: , Rfl:     tamsulosin (FLOMAX) 0.4 mg Cp24, Take 1 capsule (0.4 mg total) by mouth once daily., Disp: 30 capsule, Rfl: 11    testosterone cypionate (DEPOTESTOTERONE CYPIONATE) 100 mg/mL injection, Inject 200 mg into the muscle every 14 (fourteen) days., Disp: , Rfl:     amlodipine-benazepril (LOTREL) 10-40 mg per capsule, Take 1 capsule by mouth once daily., Disp: , Rfl:     etodolac (LODINE XL) 400 MG 24 hr tablet, Take 400 mg by mouth once daily., Disp: , Rfl:     furosemide (LASIX) 40 MG tablet, Take 40 mg by mouth once daily. , Disp: , Rfl:     hydrocodone-acetaminophen 10-325mg (NORCO)  mg Tab, TK 1 T PO  Q 4 H PRN P, Disp: , Rfl: 0    omeprazole (PRILOSEC) 40 MG capsule, Take 40 mg by mouth once daily., Disp: , Rfl:     pravastatin (PRAVACHOL) 80 MG tablet, Take 80 mg by mouth once daily., Disp: , Rfl:       Objective:      Vitals:    05/23/18 1415   BP: 136/88   Pulse: 65   Resp: 18     Body mass index is 36.01 kg/m².    Physical Exam    Constitutional:     He appears well-developed and well-nourished. He is well groomed  HENT:    Head: Atraumatic, oral and nasal mucosa intact  Eyes: Conjunctivae and EOM are normal. Pupils are equal, round, and reactive to light OU. Smaller left palpebral fissure.  Neck: Neck supple. No thyromegaly present  Cardiovascular: Normal rate and normal heart sounds  No murmur heard  Pulmonary/Chest: Effort normal and breath sounds normal  Musculoskeletal: Poor posture with stooped head forward  Skin: Skin is warm and dry  Psychiatric: Normal mood and affect     Neuro exam:    Mental status:  Awake, attentive, Alert, oriented to self, place, year and month  Language function is intact    Cranial Nerves:  Smell was not  formally evaluated  Cranial Nerves II - XII: intact  Pursuits were smooth, normal saccades, no nystagmus OU  Funduscopic exam - disc were flat and pink, no exudates or hemorrhages OU  Motor - facial movement was symmetrical and normal     Palate moved well and was symmetrical with normal palatal and oral sensation  Tongue movements were full    Coordination:     Rapid alternating movements and rapid finger tapping - normal bilaterally  Finger to nose - normal and symmetric bilaterally   Arm roll - smooth and symmetric   No intentional or positional tremor.     Motor:  Normal muscle bulk and symmetry. No fasciculations were noted    No pronator drift  Strength of shoulder abduction, elbow flexion, wrist extension, elbow extension, finger flexion and hand intrinsics were 5/5 bilaterally    Strength of hip flexion, knee extension, knee flexion, ankle dorsiflexion, ankle plantarflexion were 5/5 bilaterally     Reflexes:  Tendon reflexes were 2 + at biceps, triceps, brachioradialis, patellar, and Achilles bilaterally  On plantar stimulation toes were down going bilaterally  No clonus was noted     Sensory: Intact to light touch, pin prick in all extremities.    Gait: Romberg absent. Normal gait. Good tandem    Review of Data:  Lab Results   Component Value Date     03/11/2016     03/13/2018    K 3.8 03/13/2018     03/13/2018    CO2 22 03/13/2018    BUN 11 03/13/2018    CREATININE 1.00 03/13/2018     (H) 03/13/2018    AST 20 03/13/2018    AST 43 05/11/2016    ALT 24 03/13/2018    ALBUMIN 3.7 03/13/2018    PROT 7.3 03/13/2018    BILITOT 0.6 03/13/2018       Lab Results   Component Value Date    WBC 8.75 03/13/2018    HGB 15.5 03/13/2018    HCT 46.7 03/13/2018    MCV 86 03/13/2018     03/13/2018               Assessment and Plan   The patient has suffered with chronic intractable headaches since 1999. He has seen at least 7 specialists but has been unable to find relief. He recalls  responding to a left JUANY block. He has failed multiple medications (see above) as well as 2 rounds of Botox and cervical RFA. He has had at least 3 head CT, all negative.  He meets criteria for left sided chronic Occipital Neuralgia as well as Migraine without Aura. I have discussed the side effects of the medications prescribed and the patient acknowledges understanding.  He described a strong throbbing elements associated with the occipital pain. TCD to assess velocities with focus on the left side performed yesterday.  Lamictal for prevention titrating slowly to an initial goal of 50 mg BID was not tolerated because of extreme sedation and inability to get out of bed.  He inquires how to come off the Lortab. I advised him that whoever is prescribing it needs to be the one to taper off.    Acute treatment is limited because of cardiac history. Will try Intranasal lidocaine to abort the attack.  Trial of Iamovig for 2 months, if effective, will see a PA to make it available to the patient.  Consider referral to Dr. Friedman for consideration for cryolysis of the left occipital nerve  Other considerations:  Sphenopalatine Ganglion block  Occipital Nerve Neuromodulation      Counseling:  More than 50% of the 25 minute encounter was spent face to face counseling the patient regarding current status and future plan of care as well as side of the medications. All questions were answered to patient's satisfaction          Allan Wilson M.D  Medical Director, Headache and Facial Pain  [facility]

## 2018-06-06 ENCOUNTER — TELEPHONE (OUTPATIENT)
Dept: NEUROLOGY | Facility: CLINIC | Age: 68
End: 2018-06-06

## 2018-06-06 NOTE — TELEPHONE ENCOUNTER
Called speciality pharmacy in regards to Aimovig and clarified prescription and medication dosage. All questions and concerns answered. Pharmacist verbalized understanding.

## 2018-06-06 NOTE — TELEPHONE ENCOUNTER
----- Message from Palmira Iraheta sent at 6/6/2018  9:07 AM CDT -----  Type:  Pharmacy Calling to Clarify an RX    Name of Caller:  Sandra  Pharmacy Name:  [facility]  Prescription Name:  Aimovig   What do they need to clarify?:  They need to know the miligrams, dosage  Best Call Back Number:  [phone]  When calling back, ask to speak to the pharmacist.

## 2018-06-25 ENCOUNTER — TELEPHONE (OUTPATIENT)
Dept: NEUROLOGY | Facility: CLINIC | Age: 68
End: 2018-06-25

## 2018-06-25 NOTE — TELEPHONE ENCOUNTER
----- Message from Karly Centeno sent at 6/25/2018 11:03 AM CDT -----  Contact: Jackie Mejia with [employer] [facility] [phone] ext [phone] is calling to discuss the Aimovig/please call

## 2018-06-25 NOTE — TELEPHONE ENCOUNTER
Called insurance in regards to patients coverage for Aimovig. All questions and concerns answered, waiting on decision letter via fax.

## 2018-06-28 ENCOUNTER — TELEPHONE (OUTPATIENT)
Dept: NEUROLOGY | Facility: CLINIC | Age: 68
End: 2018-06-28

## 2018-06-28 NOTE — TELEPHONE ENCOUNTER
Called Jackie castellon at [facility]. No answer. Left voicemail to call back.

## 2018-06-28 NOTE — TELEPHONE ENCOUNTER
----- Message from Alivia Sutherland sent at 6/28/2018 12:39 PM CDT -----  Type:  Pharmacy Calling to Clarify an RX    Name of Caller:  [facility]   Pharmacy Name:  [facility]   Prescription Name: Tamanna  What do they need to clarify? Tamanna  Best Call Back Number: [phone] ext [phone]  Additional Information:  Wanted to check status of Non-formulary exception was submitted for insurance, last spoke to someone on 6/25. Fax # [phone]

## 2018-07-11 ENCOUNTER — TELEPHONE (OUTPATIENT)
Dept: NEUROLOGY | Facility: CLINIC | Age: 68
End: 2018-07-11

## 2018-07-11 NOTE — TELEPHONE ENCOUNTER
----- Message from Cipriano Birch sent at 7/11/2018  2:48 PM CDT -----  Contact: Jackie  Type: Needs Medical Advice    Who Called:  Jackie with [employer] [facility]  Symptoms (please be specific):    How long has patient had these symptoms:    Pharmacy name and phone #:    Best Call Back Number: [phone] ext [phone]  Additional Information: called regarding prior authorizations for non formulary exceptions. Requesting a call back from Toi

## 2019-05-31 PROBLEM — N28.9 RENAL INSUFFICIENCY: Status: ACTIVE | Noted: 2019-05-31

## 2019-05-31 PROBLEM — R07.9 EXERTIONAL CHEST PAIN: Status: ACTIVE | Noted: 2019-05-31

## 2019-05-31 PROBLEM — I20.0 UNSTABLE ANGINA: Status: ACTIVE | Noted: 2019-05-31

## 2019-07-05 ENCOUNTER — PATIENT OUTREACH (OUTPATIENT)
Dept: ADMINISTRATIVE | Facility: HOSPITAL | Age: 69
End: 2019-07-05

## 2019-07-05 NOTE — LETTER
July 5, 2019    Larry Ortega MD             [facility]  [address]  [facility] [address]  Phone: [phone] July 5, 2019     Patient: William Manuel    YOB: 1950   Date of Visit: 7/5/2019       To Whom It May Concern:      [facility]    We are seeing William Manuel  in the clinic today at [facility].  Yann Cardona MD is their PCP.  She/He has an outstanding lab/procedure at this time when reviewing their chart.  To help with our Health Maintenance records will you please supply the following:                                                    [x]  Colonoscopy                                           Please Fax to [facility] at [phone]    Thank you for your help, Christi Hermosillo LPN-NICHOLE.  If I can be of any assistance you can call at [phone]      Sincerely,    Christi Hermosillo, Care Coordinator  [facility]  Phone: [phone]  Fax: [phone]

## 2019-07-05 NOTE — LETTER
July 5, 2019    William MCCONNELL Sairashelby Rizvi  [address] [facility] [address]             [facility]  [address]  [facility] [address]  Phone: [phone] Dear Mr. Manuel:    [facility] is committed to your overall health.  To help you get the most out of each of your visits, we will review your information to make sure you are up to date on all of your recommended tests and/or procedures.      Dr. Almaraz    has found that your chart shows you may be due for the following:    One-time Hepatitis C Screening lab test(a viral condition that can harm the liver)  Pneumonia Immunization  Diabetic Foot Exam  Annual Dilated Eye Exam  Tetanus Immunization    If you have had any of the above done at another facility, please bring the records with you or Fax them to [phone] so that your record at [facility] will be complete. If you have not had any of these tests or procedures done recently and would like to complete this testing ,  please call [phone] or send a message through your [facility] portal to your provider's office.     If you have an upcoming scheduled appointment for the above test and/or procedures, please disregard this letter.    If you are currently taking medication, please bring it with you to your appointment for review.    Sincerely,    Christi Hermosillo, Care Coordinator  [facility]  Phone: [phone]  Fax: [phone]

## 2019-07-05 NOTE — PROGRESS NOTES
Health Maintenance Due   Topic Date Due    Hepatitis C Screening  1950    Foot Exam  12/26/1959    Eye Exam  12/26/1959    TETANUS VACCINE  12/26/1967    Pneumococcal Vaccine (65+ Low/Medium Risk) (1 of 2 - PCV13) 12/26/2014    Shingles Vaccine (2 of 3) 03/03/2015     Pre-visit outreach via mail  Requested cscope

## 2019-07-18 ENCOUNTER — OFFICE VISIT (OUTPATIENT)
Dept: FAMILY MEDICINE | Facility: CLINIC | Age: 69
End: 2019-07-18
Payer: MEDICARE

## 2019-07-18 VITALS
HEIGHT: 72 IN | HEART RATE: 72 BPM | SYSTOLIC BLOOD PRESSURE: 124 MMHG | OXYGEN SATURATION: 95 % | DIASTOLIC BLOOD PRESSURE: 88 MMHG | BODY MASS INDEX: 36.16 KG/M2 | WEIGHT: 267 LBS

## 2019-07-18 DIAGNOSIS — I10 ESSENTIAL HYPERTENSION: Primary | ICD-10-CM

## 2019-07-18 DIAGNOSIS — F41.9 ANXIETY: ICD-10-CM

## 2019-07-18 DIAGNOSIS — I73.9 VASOSPASM: ICD-10-CM

## 2019-07-18 DIAGNOSIS — F11.93 OPIATE WITHDRAWAL: ICD-10-CM

## 2019-07-18 DIAGNOSIS — N40.0 BENIGN PROSTATIC HYPERPLASIA WITHOUT LOWER URINARY TRACT SYMPTOMS: ICD-10-CM

## 2019-07-18 DIAGNOSIS — E29.1 HYPOGONADISM IN MALE: ICD-10-CM

## 2019-07-18 DIAGNOSIS — K21.9 GERD WITHOUT ESOPHAGITIS: ICD-10-CM

## 2019-07-18 DIAGNOSIS — E66.01 MORBID (SEVERE) OBESITY DUE TO EXCESS CALORIES: ICD-10-CM

## 2019-07-18 DIAGNOSIS — G47.33 OSA ON CPAP: ICD-10-CM

## 2019-07-18 DIAGNOSIS — E66.09 CLASS 2 OBESITY DUE TO EXCESS CALORIES WITH BODY MASS INDEX (BMI) OF 36.0 TO 36.9 IN ADULT, UNSPECIFIED WHETHER SERIOUS COMORBIDITY PRESENT: ICD-10-CM

## 2019-07-18 DIAGNOSIS — I25.10 CORONARY ARTERY DISEASE INVOLVING NATIVE CORONARY ARTERY OF NATIVE HEART WITHOUT ANGINA PECTORIS: ICD-10-CM

## 2019-07-18 PROBLEM — E66.812 CLASS 2 OBESITY DUE TO EXCESS CALORIES WITH BODY MASS INDEX (BMI) OF 36.0 TO 36.9 IN ADULT: Status: ACTIVE | Noted: 2019-07-18

## 2019-07-18 PROCEDURE — 99204 OFFICE O/P NEW MOD 45 MIN: CPT | Mod: S$GLB,,, | Performed by: INTERNAL MEDICINE

## 2019-07-18 PROCEDURE — 3079F DIAST BP 80-89 MM HG: CPT | Mod: S$GLB,,, | Performed by: INTERNAL MEDICINE

## 2019-07-18 PROCEDURE — 3074F SYST BP LT 130 MM HG: CPT | Mod: S$GLB,,, | Performed by: INTERNAL MEDICINE

## 2019-07-18 PROCEDURE — 3045F PR MOST RECENT HEMOGLOBIN A1C LEVEL 7.0-9.0%: ICD-10-PCS | Mod: S$GLB,,, | Performed by: INTERNAL MEDICINE

## 2019-07-18 PROCEDURE — 3079F PR MOST RECENT DIASTOLIC BLOOD PRESSURE 80-89 MM HG: ICD-10-PCS | Mod: S$GLB,,, | Performed by: INTERNAL MEDICINE

## 2019-07-18 PROCEDURE — 1101F PT FALLS ASSESS-DOCD LE1/YR: CPT | Mod: S$GLB,,, | Performed by: INTERNAL MEDICINE

## 2019-07-18 PROCEDURE — 3074F PR MOST RECENT SYSTOLIC BLOOD PRESSURE < 130 MM HG: ICD-10-PCS | Mod: S$GLB,,, | Performed by: INTERNAL MEDICINE

## 2019-07-18 PROCEDURE — 1101F PR PT FALLS ASSESS DOC 0-1 FALLS W/OUT INJ PAST YR: ICD-10-PCS | Mod: S$GLB,,, | Performed by: INTERNAL MEDICINE

## 2019-07-18 PROCEDURE — 3045F PR MOST RECENT HEMOGLOBIN A1C LEVEL 7.0-9.0%: CPT | Mod: S$GLB,,, | Performed by: INTERNAL MEDICINE

## 2019-07-18 PROCEDURE — 99999 PR PBB SHADOW E&M-EST. PATIENT-LVL III: CPT | Mod: PBBFAC,,, | Performed by: INTERNAL MEDICINE

## 2019-07-18 PROCEDURE — 99999 PR PBB SHADOW E&M-EST. PATIENT-LVL III: ICD-10-PCS | Mod: PBBFAC,,, | Performed by: INTERNAL MEDICINE

## 2019-07-18 PROCEDURE — 99204 PR OFFICE/OUTPT VISIT, NEW, LEVL IV, 45-59 MIN: ICD-10-PCS | Mod: S$GLB,,, | Performed by: INTERNAL MEDICINE

## 2019-07-18 RX ORDER — PROMETHAZINE HYDROCHLORIDE 25 MG/1
TABLET ORAL
COMMUNITY
Start: 2017-10-19 | End: 2021-09-23

## 2019-07-18 RX ORDER — ETODOLAC 400 MG/1
TABLET, FILM COATED ORAL
COMMUNITY
Start: 2019-06-26 | End: 2020-07-01 | Stop reason: SDUPTHER

## 2019-07-18 RX ORDER — TAMSULOSIN HYDROCHLORIDE 0.4 MG/1
CAPSULE ORAL
COMMUNITY
Start: 2019-06-26 | End: 2020-02-04 | Stop reason: SDUPTHER

## 2019-07-18 RX ORDER — FUROSEMIDE 40 MG/1
TABLET ORAL
COMMUNITY
Start: 2019-06-26 | End: 2020-01-13 | Stop reason: SDUPTHER

## 2019-07-18 RX ORDER — CLONAZEPAM 0.5 MG/1
TABLET ORAL
COMMUNITY
Start: 2019-06-08 | End: 2020-02-04 | Stop reason: SDUPTHER

## 2019-07-18 NOTE — PROGRESS NOTES
Patient ID: William Manuel Jr.     Chief Complaint:   Chief Complaint   Patient presents with    [facility]        HPI: New Patient to [facility] but I have seen him in past in hospital.  Past Medical History of Coronary Artery Disease, hypertension  , diabetes mellitus, hyperlipidemia  .   He was doing well after he lost 70 lbs w/ Lap Band, however, he did need numerous stents and CABG x 6.   His health has declined in last few months: shortness of breath , fatigue, no CP - saw Nieves and got another stent and will be evaluated tomorrow by Nieves for further invasive therapy.  Labs from ST reviewed: LDL 89, A1C 8. Will start Januvia and improve diet.    Review of Systems   Constitutional: Negative.    HENT: Negative.    Eyes: Negative.    Respiratory: Positive for shortness of breath.    Cardiovascular: Negative.    Gastrointestinal: Negative.    Endocrine: Negative.    Genitourinary: Negative.    Musculoskeletal: Negative.    Skin: Negative.    Allergic/Immunologic: Negative.    Neurological: Negative.    Hematological: Negative.    Psychiatric/Behavioral: Negative.    All other systems reviewed and are negative.         Objective:      Physical Exam   Physical Exam   Constitutional: He is oriented to person, place, and time. He appears well-developed and well-nourished.   HENT:   Head: Normocephalic and atraumatic.   Nose: Nose normal.   Mouth/Throat: Oropharynx is clear and moist.   Eyes: Pupils are equal, round, and reactive to light. Conjunctivae and EOM are normal.   Neck: Normal range of motion. Neck supple.   Cardiovascular: Normal rate, regular rhythm, normal heart sounds and intact distal pulses.   Pulmonary/Chest: Effort normal and breath sounds normal.   Abdominal: Soft. Bowel sounds are normal.   Musculoskeletal: Normal range of motion.   Neurological: He is alert and oriented to person, place, and time.   Skin: Skin is warm and dry. Capillary refill takes less than 2 seconds.   Psychiatric: He  has a normal mood and affect. His behavior is normal. Judgment and thought content normal.   Nursing note and vitals reviewed.     Protective Sensation (w/ 10 gram monofilament):  Right: Intact  Left: Intact    Visual Inspection:  Normal -  Bilateral    Pedal Pulses:   Right: Present  Left: Present    Posterior tibialis:   Right:Present  Left: Present          Vitals:   Vitals:    07/18/19 1504   BP: 124/88   BP Location: Left arm   Patient Position: Sitting   BP Method: Large (Manual)   Pulse: 72   SpO2: 95%   Weight: 121.1 kg (266 lb 15.6 oz)   Height: 6' (1.829 m)      Assessment:       Patient Active Problem List    Diagnosis Date Noted    Morbid (severe) obesity due to excess calories 07/18/2019    Opiate withdrawal 07/18/2019    Vasospasm 07/18/2019    Class 2 obesity due to excess calories with body mass index (BMI) of 36.0 to 36.9 in adult 07/18/2019    VONNIE on CPAP     Hypertension     GERD without esophagitis     BPH (benign prostatic hyperplasia)     Anxiety     Unstable angina 05/31/2019    Renal insufficiency 05/31/2019    Benign prostatic hyperplasia with incomplete bladder emptying 03/13/2018    Acute urinary retention 03/13/2018    Acute prostatitis 03/13/2018    Right lower quadrant abdominal pain 03/11/2018    Flank pain, acute 03/11/2018    Migraine headache     H/O heart bypass surgery     Diabetes mellitus     Coronary artery disease     Claustrophobia     Arthritis     Anticoagulant long-term use     Sepsis secondary to UTI 03/10/2018    Spondylosis without myelopathy or radiculopathy, cervical region 05/23/2016    Essential (primary) hypertension 05/11/2016    Type 2 diabetes mellitus 05/11/2016    Sleep apnea 05/11/2016    Coronary artery disease involving native coronary artery of native heart without angina pectoris 05/11/2016    Polycythemia 05/11/2016    Acute lower GI bleeding 05/11/2016    Dyslipidemia 05/11/2016    Gastroesophageal reflux disease without  esophagitis 05/11/2016    Bright red rectal bleeding 05/11/2016    Hypogonadism in male 05/11/2016    Migraine without aura and without status migrainosus, not intractable 05/11/2016    Primary osteoarthritis involving multiple joints 05/11/2016          Plan:     William was seen today for establish care.    Diagnoses and all orders for this visit:    Essential hypertension  -     Hepatitis C antibody; Future  Controlled     VONNIE on CPAP  Controlled     GERD without esophagitis  Controlled    Benign prostatic hyperplasia without lower urinary tract symptoms  Monitor PSA    Anxiety  Controlled     Morbid (severe) obesity due to excess calories  Can't exercise now    Class 2 obesity due to excess calories with body mass index (BMI) of 36.0 to 36.9 in adult, unspecified whether serious comorbidity present  Can't exercise now     Uncontrolled type 2 diabetes mellitus with circulatory disorder, without long-term current use of insulin  -     SITagliptin (JANUVIA) 100 MG Tab; Take 1 tablet (100 mg total) by mouth once daily.  -     Microalbumin/creatinine urine ratio; Future  -     Hemoglobin A1c; Future    Coronary artery disease involving native coronary artery of native heart without angina pectoris  -     Lipid panel; Future  Will see Nieves tomorrow     Hypogonadism in male  -     Testosterone; Future

## 2019-07-25 ENCOUNTER — TELEPHONE (OUTPATIENT)
Dept: FAMILY MEDICINE | Facility: CLINIC | Age: 69
End: 2019-07-25

## 2019-07-25 DIAGNOSIS — I25.10 CORONARY ARTERY DISEASE INVOLVING NATIVE CORONARY ARTERY OF NATIVE HEART WITHOUT ANGINA PECTORIS: Primary | ICD-10-CM

## 2019-07-25 NOTE — TELEPHONE ENCOUNTER
"Please see note below and advise if ED recommended for pt. Referral pended.    Spoke to pt  Stated he had an angiogram done and has a blockage on the bottom of his heart. Pt. Stated the doctor never fixed it. Pt. Denies shortness of breath at this time and said "I only get short of breath when I walk or move around too much." Pt. Stated he feels fatigued and "does not feel well". Asking for referral to cardiology. Advised pt. I would send a message to the doctor and call him back. Pt. Verbalized understanding. Phone call ended.  "

## 2019-07-25 NOTE — TELEPHONE ENCOUNTER
"I spoke with the patient again. He stated, He does not want to see Dr. Pickett again". He would like to be referred to an [facility] provider if that is possible.  "

## 2019-07-25 NOTE — TELEPHONE ENCOUNTER
----- Message from Julieta Deluca sent at 7/25/2019 11:43 AM CDT -----  Contact: Sabra Manuel (Spouse)  Sabra Manuel (Spouse) calling states that for 2 weeks been trying to get in to see the pt cardiologist and had no luck was wanting to know if the Dr could get the pt in with anyone at [facility], the Dr had told them when last in to let him know if there is anything can do,pt is out of breath allot when walking ,pt is getting frustrated and really needing advise,please cause pt is weak ,feels bad really needs to be seen as soon as possible with a cardiologist...[phone] or [phone]

## 2019-07-25 NOTE — TELEPHONE ENCOUNTER
He has seen Dr. Pickett in the past. Note states he was seeing Dr. Pickett the day after he saw Sugn. He should call for an appointment, does not need a referral.

## 2019-07-29 ENCOUNTER — TELEPHONE (OUTPATIENT)
Dept: FAMILY MEDICINE | Facility: CLINIC | Age: 69
End: 2019-07-29

## 2019-07-29 DIAGNOSIS — I25.10 CORONARY ARTERY DISEASE INVOLVING NATIVE CORONARY ARTERY OF NATIVE HEART WITHOUT ANGINA PECTORIS: Primary | ICD-10-CM

## 2019-07-29 NOTE — TELEPHONE ENCOUNTER
If that was first available there may not be anything else unless someone cancels. I can add the appt to the waiting list so that if something does become available sooner, he will be notified.

## 2019-07-29 NOTE — TELEPHONE ENCOUNTER
----- Message from Eloisa South sent at 7/29/2019 10:14 AM CDT -----  Contact: pt wife  Need a referral for pt see a cardiologist and  please advise. [phone] (home) or [phone]

## 2019-09-30 RX ORDER — METOPROLOL SUCCINATE 100 MG/1
100 TABLET, EXTENDED RELEASE ORAL DAILY
Qty: 30 TABLET | Refills: 0 | Status: SHIPPED | OUTPATIENT
Start: 2019-09-30 | End: 2020-07-07

## 2019-09-30 RX ORDER — METOPROLOL SUCCINATE 100 MG/1
100 TABLET, EXTENDED RELEASE ORAL DAILY
Qty: 90 TABLET | Refills: 3 | Status: SHIPPED | OUTPATIENT
Start: 2019-09-30 | End: 2020-10-05

## 2019-09-30 NOTE — TELEPHONE ENCOUNTER
----- Message from Caitie Neff sent at 9/30/2019  9:07 AM CDT -----  Type:  RX Refill Request    Who Called:  Sabra Jackson - spouse  Refill or New Rx:  refill  RX Name and Strength: metoprolol succinate (TOPROL-XL) 100 MG 24 hr tablet  How is the patient currently taking it? (ex. 1XDay):  1 x day  Is this a 30 day or 90 day RX:  30 to local pharmacy 90 to [facility]  Preferred Pharmacy with phone number:      30 day to   [facility] [address]  Phone: [phone]     90 day to   [facility] [address] [address]  Phone: [phone] Fax: [phone]      Local or Mail Order: both  Ordering Provider:    Best Call Back Number:  [phone]  Additional Information:

## 2019-10-04 DIAGNOSIS — E11.9 TYPE 2 DIABETES MELLITUS WITHOUT COMPLICATION, UNSPECIFIED WHETHER LONG TERM INSULIN USE: ICD-10-CM

## 2019-10-11 ENCOUNTER — TELEPHONE (OUTPATIENT)
Dept: ADMINISTRATIVE | Facility: HOSPITAL | Age: 69
End: 2019-10-11

## 2019-11-24 RX ORDER — TESTOSTERONE CYPIONATE 1000 MG/10ML
INJECTION, SOLUTION INTRAMUSCULAR
Qty: 5 ML | Refills: 3 | Status: SHIPPED | OUTPATIENT
Start: 2019-11-24 | End: 2020-08-17 | Stop reason: SDUPTHER

## 2019-11-26 ENCOUNTER — TELEPHONE (OUTPATIENT)
Dept: FAMILY MEDICINE | Facility: CLINIC | Age: 69
End: 2019-11-26

## 2019-11-26 NOTE — TELEPHONE ENCOUNTER
----- Message from Palmira Iraheta sent at 11/26/2019 12:30 PM CST -----  Type:  Pharmacy Calling to Clarify an RX    Name of Caller:  [facility]  Pharmacy Name:  [facility]  Prescription Name:  testosterone cypionate (DEPOTESTOTERONE CYPIONATE) 100 mg/mL injection   What do they need to clarify?:  They do not dispense the 100 mg. They have sent the 200 mg/ They want to clarify the strength and the dose.  Best Call Back Number:  [phone] Ref#[id number]

## 2019-11-26 NOTE — TELEPHONE ENCOUNTER
His testosterone prescription says to take 100 mL intramuscularly every 14 days.  The clarification could be to dispense 200 milligrams/milliliter vials and instructions are take 1/2 mL intramuscularly a every 2 weeks.

## 2020-01-14 RX ORDER — FUROSEMIDE 40 MG/1
40 TABLET ORAL DAILY
Qty: 90 TABLET | Refills: 3 | Status: SHIPPED | OUTPATIENT
Start: 2020-01-14 | End: 2020-12-16

## 2020-02-04 DIAGNOSIS — K21.9 GASTROESOPHAGEAL REFLUX DISEASE WITHOUT ESOPHAGITIS: ICD-10-CM

## 2020-02-04 DIAGNOSIS — N40.0 BENIGN PROSTATIC HYPERPLASIA WITHOUT LOWER URINARY TRACT SYMPTOMS: ICD-10-CM

## 2020-02-04 DIAGNOSIS — F41.9 ANXIETY: ICD-10-CM

## 2020-02-04 DIAGNOSIS — I10 ESSENTIAL (PRIMARY) HYPERTENSION: Primary | ICD-10-CM

## 2020-02-05 RX ORDER — AMLODIPINE AND BENAZEPRIL HYDROCHLORIDE 10; 40 MG/1; MG/1
1 CAPSULE ORAL DAILY
Qty: 90 CAPSULE | Refills: 3 | Status: SHIPPED | OUTPATIENT
Start: 2020-02-05 | End: 2021-01-07

## 2020-02-05 RX ORDER — TAMSULOSIN HYDROCHLORIDE 0.4 MG/1
0.4 CAPSULE ORAL DAILY
Qty: 90 CAPSULE | Refills: 3 | Status: SHIPPED | OUTPATIENT
Start: 2020-02-05 | End: 2021-01-07

## 2020-02-05 RX ORDER — CLONAZEPAM 0.5 MG/1
0.5 TABLET ORAL DAILY
Qty: 90 TABLET | Refills: 1 | Status: SHIPPED | OUTPATIENT
Start: 2020-02-05 | End: 2020-07-27 | Stop reason: SDUPTHER

## 2020-02-05 RX ORDER — OMEPRAZOLE 40 MG/1
40 CAPSULE, DELAYED RELEASE ORAL DAILY
Qty: 90 CAPSULE | Refills: 3 | Status: SHIPPED | OUTPATIENT
Start: 2020-02-05 | End: 2021-01-07

## 2020-02-13 LAB
CHOLEST SERPL-MSCNC: 193 MG/DL (ref 0–200)
HDLC SERPL-MCNC: 41 MG/DL (ref 35–70)
LDLC SERPL CALC-MCNC: 127 MG/DL (ref 0–160)
NONHDLC SERPL-MCNC: 152 MG/DL
TRIGL SERPL-MCNC: 288 MG/DL (ref 40–160)

## 2020-03-23 ENCOUNTER — TELEPHONE (OUTPATIENT)
Dept: FAMILY MEDICINE | Facility: CLINIC | Age: 70
End: 2020-03-23

## 2020-03-23 NOTE — TELEPHONE ENCOUNTER
Lab results from Dr. Sahni from February 13, 2020 reviewed.  They all look okay except he does have quite a bit of blood in his body likely due to the testosterone supplementation they takes.  His LDL is also too high for a diabetic.  I might him to make a video visit discuss these labs.  He also has other labs in the system that I would like to get.  He does not have to get a repeat cholesterol panel though.

## 2020-03-26 ENCOUNTER — PATIENT OUTREACH (OUTPATIENT)
Dept: ADMINISTRATIVE | Facility: HOSPITAL | Age: 70
End: 2020-03-26

## 2020-03-26 NOTE — PROGRESS NOTES
Updated HM with lab report      Health Maintenance Due   Topic Date Due    Hepatitis C Screening  1950    Eye Exam  12/26/1959    TETANUS VACCINE  12/26/1967    Shingles Vaccine (2 of 3) 03/03/2015    Hemoglobin A1c  08/31/2019    Influenza Vaccine (1) 09/01/2019

## 2020-04-17 ENCOUNTER — TELEPHONE (OUTPATIENT)
Dept: FAMILY MEDICINE | Facility: CLINIC | Age: 70
End: 2020-04-17

## 2020-04-17 DIAGNOSIS — N20.0 KIDNEY STONE: Primary | ICD-10-CM

## 2020-04-17 RX ORDER — HYDROCODONE BITARTRATE AND ACETAMINOPHEN 10; 325 MG/1; MG/1
1 TABLET ORAL EVERY 6 HOURS PRN
Qty: 60 TABLET | Refills: 0 | Status: SHIPPED | OUTPATIENT
Start: 2020-04-17 | End: 2020-06-05 | Stop reason: SDUPTHER

## 2020-04-17 NOTE — TELEPHONE ENCOUNTER
Prescription sent in for Norco 10 mg to take every 4 hr as needed for pain.  In the meantime drink plenty water and hopefully can pass it on your own.  It may be too big to pass on its own, but we will not know that unless we get a CT scan of your kidneys.

## 2020-04-17 NOTE — TELEPHONE ENCOUNTER
----- Message from Yuriy Rivas sent at 4/17/2020 12:57 PM CDT -----  Contact: pt's wife Sabra  Type: Needs Medical Advice    Who Called:  Sabra    Larry Call Back Number: [phone]  Additional Information: Pt has a kidney stone. Pt is in some pain.  Not sure what to do as far as for treatment. Please call to advise.

## 2020-04-17 NOTE — TELEPHONE ENCOUNTER
Spoke to pt. Pt states that about a year ago, he was told he had a kidney stone but states it never bothered him up until about two weeks ago when it started moving, he states the pain went away but he started having pain again this morning around am. He states the pain is on the R side of his back. Pt states his pain was 11/10 this morning and right now it is about a 4/10. Pt was advised by his pulmonologist that he should not be in any hospitals or clinics right now and would like to know if something could just be called in for the pain. Pt denies any other symptoms. Please advise.

## 2020-05-15 RX ORDER — PRAVASTATIN SODIUM 80 MG/1
80 TABLET ORAL DAILY
Qty: 90 TABLET | Refills: 3 | Status: SHIPPED | OUTPATIENT
Start: 2020-05-15 | End: 2020-07-07

## 2020-06-05 ENCOUNTER — OFFICE VISIT (OUTPATIENT)
Dept: FAMILY MEDICINE | Facility: CLINIC | Age: 70
End: 2020-06-05
Payer: MEDICARE

## 2020-06-05 ENCOUNTER — HOSPITAL ENCOUNTER (OUTPATIENT)
Dept: RADIOLOGY | Facility: HOSPITAL | Age: 70
Discharge: HOME | End: 2020-06-05
Attending: NURSE PRACTITIONER
Payer: MEDICARE

## 2020-06-05 VITALS
HEIGHT: 72 IN | OXYGEN SATURATION: 97 % | SYSTOLIC BLOOD PRESSURE: 146 MMHG | DIASTOLIC BLOOD PRESSURE: 88 MMHG | HEART RATE: 103 BPM | WEIGHT: 270.5 LBS | TEMPERATURE: 99 F | BODY MASS INDEX: 36.64 KG/M2

## 2020-06-05 DIAGNOSIS — R05.9 COUGH: ICD-10-CM

## 2020-06-05 DIAGNOSIS — R06.2 WHEEZING: ICD-10-CM

## 2020-06-05 DIAGNOSIS — J22 LOWER RESPIRATORY INFECTION (E.G., BRONCHITIS, PNEUMONIA, PNEUMONITIS, PULMONITIS): Primary | ICD-10-CM

## 2020-06-05 DIAGNOSIS — E66.01 MORBID (SEVERE) OBESITY DUE TO EXCESS CALORIES: ICD-10-CM

## 2020-06-05 DIAGNOSIS — N20.0 KIDNEY STONE: ICD-10-CM

## 2020-06-05 PROCEDURE — 99214 OFFICE O/P EST MOD 30 MIN: CPT | Mod: S$GLB,,, | Performed by: NURSE PRACTITIONER

## 2020-06-05 PROCEDURE — 1101F PT FALLS ASSESS-DOCD LE1/YR: CPT | Mod: S$GLB,,, | Performed by: NURSE PRACTITIONER

## 2020-06-05 PROCEDURE — 3077F SYST BP >= 140 MM HG: CPT | Mod: S$GLB,,, | Performed by: NURSE PRACTITIONER

## 2020-06-05 PROCEDURE — 3079F PR MOST RECENT DIASTOLIC BLOOD PRESSURE 80-89 MM HG: ICD-10-PCS | Mod: S$GLB,,, | Performed by: NURSE PRACTITIONER

## 2020-06-05 PROCEDURE — 99214 PR OFFICE/OUTPT VISIT, EST, LEVL IV, 30-39 MIN: ICD-10-PCS | Mod: S$GLB,,, | Performed by: NURSE PRACTITIONER

## 2020-06-05 PROCEDURE — 1125F AMNT PAIN NOTED PAIN PRSNT: CPT | Mod: S$GLB,,, | Performed by: NURSE PRACTITIONER

## 2020-06-05 PROCEDURE — 71046 X-RAY EXAM CHEST 2 VIEWS: CPT | Mod: TC,FY,PO

## 2020-06-05 PROCEDURE — 71046 XR CHEST PA AND LATERAL: ICD-10-PCS | Mod: 26,,, | Performed by: RADIOLOGY

## 2020-06-05 PROCEDURE — 1101F PR PT FALLS ASSESS DOC 0-1 FALLS W/OUT INJ PAST YR: ICD-10-PCS | Mod: S$GLB,,, | Performed by: NURSE PRACTITIONER

## 2020-06-05 PROCEDURE — 1159F PR MEDICATION LIST DOCUMENTED IN MEDICAL RECORD: ICD-10-PCS | Mod: S$GLB,,, | Performed by: NURSE PRACTITIONER

## 2020-06-05 PROCEDURE — 99999 PR PBB SHADOW E&M-EST. PATIENT-LVL III: ICD-10-PCS | Mod: PBBFAC,,, | Performed by: NURSE PRACTITIONER

## 2020-06-05 PROCEDURE — 71046 X-RAY EXAM CHEST 2 VIEWS: CPT | Mod: 26,,, | Performed by: RADIOLOGY

## 2020-06-05 PROCEDURE — 1159F MED LIST DOCD IN RCRD: CPT | Mod: S$GLB,,, | Performed by: NURSE PRACTITIONER

## 2020-06-05 PROCEDURE — 1125F PR PAIN SEVERITY QUANTIFIED, PAIN PRESENT: ICD-10-PCS | Mod: S$GLB,,, | Performed by: NURSE PRACTITIONER

## 2020-06-05 PROCEDURE — 3077F PR MOST RECENT SYSTOLIC BLOOD PRESSURE >= 140 MM HG: ICD-10-PCS | Mod: S$GLB,,, | Performed by: NURSE PRACTITIONER

## 2020-06-05 PROCEDURE — 3079F DIAST BP 80-89 MM HG: CPT | Mod: S$GLB,,, | Performed by: NURSE PRACTITIONER

## 2020-06-05 PROCEDURE — 99999 PR PBB SHADOW E&M-EST. PATIENT-LVL III: CPT | Mod: PBBFAC,,, | Performed by: NURSE PRACTITIONER

## 2020-06-05 RX ORDER — BENZONATATE 200 MG/1
200 CAPSULE ORAL 3 TIMES DAILY PRN
Qty: 30 CAPSULE | Refills: 1 | Status: SHIPPED | OUTPATIENT
Start: 2020-06-05 | End: 2020-06-15

## 2020-06-05 RX ORDER — AZITHROMYCIN 250 MG/1
250 TABLET, FILM COATED ORAL DAILY
Qty: 5 TABLET | Refills: 0 | Status: SHIPPED | OUTPATIENT
Start: 2020-06-05 | End: 2020-06-10 | Stop reason: SDUPTHER

## 2020-06-05 NOTE — PROGRESS NOTES
Subjective:       Patient ID: William Manuel Jr. is a 69 y.o. male.    Chief Complaint: Cough; Fatigue; Nausea; and Headache    Mr. Manuel is a new patient to me.  He is here for a cough x 2 days, SOB, negative for chest pain, negative for fever.  He has a history of PE.  This is a nonproductive cough with clear rhinorrhea.  He has not taken any medication for the cough. He has only taken mucinex one time.  He also is complaining of a migraine headache, but feels it was brought about from the cough.  He is positive for sleep disturbances. No one else is sick at home.  He is accompanied by his wife during the visit for whom is providing some of the health history.    Vitals:    06/05/20 1307   BP: (!) 146/88   Pulse: 103   Temp: 99.3 °F (37.4 °C)     Review of Systems   Constitutional: Positive for activity change, appetite change, chills and fatigue. Negative for fever.   HENT: Positive for congestion, postnasal drip, rhinorrhea, sinus pressure, sinus pain, sneezing and sore throat. Negative for ear pain and trouble swallowing.    Eyes: Positive for discharge. Negative for pain, redness and visual disturbance.   Respiratory: Positive for cough and shortness of breath. Negative for choking, chest tightness and wheezing.    Cardiovascular: Negative for chest pain, palpitations and leg swelling.   Gastrointestinal: Negative for abdominal pain, blood in stool, constipation, diarrhea, nausea and vomiting.   Endocrine: Negative.    Genitourinary: Negative for decreased urine volume, difficulty urinating, dysuria, flank pain, hematuria and urgency.   Musculoskeletal: Negative for back pain, gait problem, myalgias and neck pain.   Skin: Negative for color change, pallor and rash.   Allergic/Immunologic: Negative.    Neurological: Positive for headaches. Negative for dizziness, speech difficulty, weakness, light-headedness and numbness.   Hematological: Does not bruise/bleed easily.   Psychiatric/Behavioral:  Positive for sleep disturbance. Negative for self-injury and suicidal ideas. The patient is not nervous/anxious.        BP Readings from Last 3 Encounters:   06/05/20 (!) 146/88   07/18/19 124/88   06/01/19 (!) 144/81     Past Medical History:   Diagnosis Date    Acute GI bleeding 2016    Anticoagulant long-term use     Anxiety     Arthritis     BPH (benign prostatic hyperplasia)     Claustrophobia     Coronary artery disease     Diabetes mellitus     GERD without esophagitis     H/O heart bypass surgery     Hypertension     Hypogonadism in male     Migraine headache     VONNIE on CPAP        Objective:      Physical Exam   Constitutional: He is oriented to person, place, and time. He appears well-developed and well-nourished. He is cooperative.  Non-toxic appearance. He does not have a sickly appearance. He appears ill. No distress.   HENT:   Head: Normocephalic and atraumatic. Head is with right periorbital erythema and with left periorbital erythema.   Right Ear: External ear normal. Decreased hearing is noted.   Left Ear: External ear normal. Decreased hearing is noted.   Nose: Mucosal edema and rhinorrhea present. Right sinus exhibits no maxillary sinus tenderness and no frontal sinus tenderness. Left sinus exhibits no maxillary sinus tenderness and no frontal sinus tenderness.   Mouth/Throat: Uvula is midline and mucous membranes are normal. Uvula swelling present. Posterior oropharyngeal edema and posterior oropharyngeal erythema present.   Eyes: Pupils are equal, round, and reactive to light. Conjunctivae, EOM and lids are normal.   Cardiovascular: Regular rhythm and normal heart sounds. Tachycardia present.   Pulmonary/Chest: Effort normal. No apnea, no tachypnea and no bradypnea. No respiratory distress. He has no decreased breath sounds. He has wheezes in the left upper field.   Abdominal: Soft. Bowel sounds are normal. There is no tenderness.   Musculoskeletal: Normal range of motion.    Neurological: He is alert and oriented to person, place, and time.   Skin: Skin is warm and intact. Capillary refill takes less than 2 seconds. No lesion, no petechiae and no rash noted. He is diaphoretic. No erythema.   Psychiatric: He has a normal mood and affect. His speech is normal and behavior is normal. Judgment and thought content normal. Cognition and memory are normal.   Nursing note and vitals reviewed.      Assessment:       1. Lower respiratory infection (e.g., bronchitis, pneumonia, pneumonitis, pulmonitis)    2. Wheezing    3. Cough    4. Morbid (severe) obesity due to excess calories    5. Uncontrolled type 2 diabetes mellitus with circulatory disorder, without long-term current use of insulin        Plan:    Will notify patient with xray results.    Lower respiratory infection, Wheezing  -     azithromycin (Z-GARY) 250 MG tablet; Take 1 tablet (250 mg total) by mouth once daily. Take 2 tablets on day 1, then one tablet on days 2-5. for 5 days  Dispense: 5 tablet; Refill: 0  -     X-Ray Chest PA And Lateral; Future; Expected date: 06/05/2020    Cough  -     benzonatate (TESSALON) 200 MG capsule; Take 1 capsule (200 mg total) by mouth 3 (three) times daily as needed for Cough.  Dispense: 30 capsule; Refill: 1  -     X-Ray Chest PA And Lateral; Future; Expected date: 06/05/2020    Begin antibiotic treatment today  Follow up with clinicafter treatment to report patient status.  Increase fluids.  Do not take tessalon more than 3 times a day  Take plain mucinex to thin out mucus  Educated on supportive care and return precautions to the clinic.  Follow up for further evaluation if S/S worsen or fail to improve.  Call for temperature greater than 101, pain uncontrolled by oral medications, Nausea/vomiting, inability to tolerate oral medications, chest pain, shortness of breath, altered mental status, or any acute events  Patient denied need for use of inhaler at this time due to shortness of breath.         Follow up in about 3 days (around 6/8/2020), or if symptoms worsen or fail to improve.

## 2020-06-05 NOTE — PATIENT INSTRUCTIONS
Begin antibiotic treatment today  Follow up with clinicafter treatment to report patient status.  Increase fluids.  Do not take tessalon more than 3 times a day  Take plain mucinex to thin out mucus  Educated on supportive care and return precautions to the clinic.    Follow up for further evaluation if S/S worsen or fail to improve.  Call for temperature greater than 101, pain uncontrolled by oral medications, Nausea/vomiting, inability to tolerate oral medications, chest pain, shortness of breath, altered mental status, or any acute events

## 2020-06-05 NOTE — TELEPHONE ENCOUNTER
----- Message from Rey Gibson sent at 6/5/2020  3:45 PM CDT -----  Contact: pt  Type:  Patient Returning Call    Who Called:  pt  Does the patient know what this is regarding?:  Pt was told pain med would be called in after appt. Was calling to follow up  Best Call Back Number:   Additional Information:  Please follow up

## 2020-06-06 RX ORDER — HYDROCODONE BITARTRATE AND ACETAMINOPHEN 10; 325 MG/1; MG/1
1 TABLET ORAL EVERY 6 HOURS PRN
Qty: 60 TABLET | Refills: 0 | Status: SHIPPED | OUTPATIENT
Start: 2020-06-06 | End: 2020-07-07

## 2020-06-08 NOTE — TELEPHONE ENCOUNTER
----- Message from Miguel Winn sent at 6/8/2020  8:27 AM CDT -----  Contact: Patient  Type:  Patient Returning Call    Who Called:  Patient  Who Left Message for Patient:  Traci Briones  Does the patient know what this is regarding?:  Reschedule appointment  Best Call Back Number:  [phone]  Additional Information:  NA

## 2020-06-10 DIAGNOSIS — R06.2 WHEEZING: ICD-10-CM

## 2020-06-10 RX ORDER — AZITHROMYCIN 250 MG/1
250 TABLET, FILM COATED ORAL DAILY
Qty: 5 TABLET | Refills: 0 | Status: SHIPPED | OUTPATIENT
Start: 2020-06-10 | End: 2020-06-15

## 2020-06-23 ENCOUNTER — PATIENT OUTREACH (OUTPATIENT)
Dept: ADMINISTRATIVE | Facility: HOSPITAL | Age: 70
End: 2020-06-23

## 2020-06-23 NOTE — PROGRESS NOTES
Chart review completed 06/23/2020  Care Everywhere updates requested and reviewed.  Immunizations reconciled. Media reviewed.     Health Maintenance Due   Topic Date Due    Hepatitis C Screening  1950    Eye Exam  12/26/1959    TETANUS VACCINE  12/26/1967    Shingles Vaccine (2 of 3) 03/03/2015    Hemoglobin A1c  08/31/2019    Foot Exam  07/18/2020

## 2020-06-23 NOTE — LETTER
June 23, 2020    William Manuel JrJonas  [address]  [place] [address]             [facility]  [address]  [facility] [address]  Phone: [phone] Dear Mr. Manuel:    [facility] is committed to your overall health.  To help you get the most out of each of your visits, we will review your information to make sure you are up to date on all of your recommended tests and/or procedures.      Dr. Zach Almaraz MD has found that your chart shows you may be due for     Tetanus vaccine  Shingles vaccine  Foot Exam  Eye Exam  One time Hepatitis C screening lab test ( a viral condition that harms the liver)  Hemoglobin A1C    Future Appointments   Date Time Provider Department Center   7/7/2020  4:00 PM Zach Almaraz MD [facility]     If you have had any of the above done at another facility, please bring the records or information with you so that your record at [facility] will be complete.  If you would like to schedule any of these, please contact me.    If you are currently taking medication, please bring it with you to your appointment for review.    Also, if you have any type of Advanced Directives, please bring them with you to your office visit so we may scan them into your chart.    MD Wander Vargas LPN Clinical Care Coordinator  [facility]  [address] [facility] [address]  [place] [address]  P: [phone]  F: [phone]

## 2020-06-30 ENCOUNTER — TELEPHONE (OUTPATIENT)
Dept: FAMILY MEDICINE | Facility: CLINIC | Age: 70
End: 2020-06-30

## 2020-06-30 NOTE — TELEPHONE ENCOUNTER
----- Message from Jackie Arciniega sent at 6/30/2020  4:42 PM CDT -----  Regarding: lab orders  Contact: wife  Type: Needs Medical Advice  Who Called:  patient  Best Call Back Number: [phone]  Additional Information: Wife states patient has apt 7/7 and orders for lab work have not been put in the system.  Please call to advise. Thanks!

## 2020-07-02 LAB
CHOLEST SERPL-MSCNC: 129 MG/DL (ref 0–200)
HBA1C MFR BLD: 8.3 % (ref 4–6)
HDLC SERPL-MCNC: 44 MG/DL (ref 35–70)
LDLC SERPL CALC-MCNC: 49 MG/DL (ref 0–160)
TRIGL SERPL-MCNC: 180 MG/DL (ref 40–160)
VLDLC SERPL-MCNC: 36 MG/DL

## 2020-07-02 RX ORDER — ETODOLAC 400 MG/1
400 TABLET, FILM COATED ORAL DAILY
Qty: 90 TABLET | Refills: 0 | Status: SHIPPED | OUTPATIENT
Start: 2020-07-02 | End: 2020-07-14 | Stop reason: SDUPTHER

## 2020-07-07 ENCOUNTER — OFFICE VISIT (OUTPATIENT)
Dept: FAMILY MEDICINE | Facility: CLINIC | Age: 70
End: 2020-07-07
Payer: MEDICARE

## 2020-07-07 VITALS
HEART RATE: 81 BPM | OXYGEN SATURATION: 92 % | HEIGHT: 72 IN | WEIGHT: 269.81 LBS | BODY MASS INDEX: 36.54 KG/M2 | DIASTOLIC BLOOD PRESSURE: 86 MMHG | TEMPERATURE: 99 F | SYSTOLIC BLOOD PRESSURE: 110 MMHG

## 2020-07-07 DIAGNOSIS — K21.9 GERD WITHOUT ESOPHAGITIS: ICD-10-CM

## 2020-07-07 DIAGNOSIS — Z00.00 WELLNESS EXAMINATION: Primary | ICD-10-CM

## 2020-07-07 DIAGNOSIS — G44.89 OTHER HEADACHE SYNDROME: ICD-10-CM

## 2020-07-07 DIAGNOSIS — I10 ESSENTIAL HYPERTENSION: ICD-10-CM

## 2020-07-07 DIAGNOSIS — E29.1 HYPOGONADISM IN MALE: ICD-10-CM

## 2020-07-07 PROCEDURE — 3074F PR MOST RECENT SYSTOLIC BLOOD PRESSURE < 130 MM HG: ICD-10-PCS | Mod: S$GLB,,, | Performed by: INTERNAL MEDICINE

## 2020-07-07 PROCEDURE — 99999 PR PBB SHADOW E&M-EST. PATIENT-LVL IV: ICD-10-PCS | Mod: PBBFAC,,, | Performed by: INTERNAL MEDICINE

## 2020-07-07 PROCEDURE — 3074F SYST BP LT 130 MM HG: CPT | Mod: S$GLB,,, | Performed by: INTERNAL MEDICINE

## 2020-07-07 PROCEDURE — 3079F DIAST BP 80-89 MM HG: CPT | Mod: S$GLB,,, | Performed by: INTERNAL MEDICINE

## 2020-07-07 PROCEDURE — 99999 PR PBB SHADOW E&M-EST. PATIENT-LVL IV: CPT | Mod: PBBFAC,,, | Performed by: INTERNAL MEDICINE

## 2020-07-07 PROCEDURE — 99397 PR PREVENTIVE VISIT,EST,65 & OVER: ICD-10-PCS | Mod: S$GLB,,, | Performed by: INTERNAL MEDICINE

## 2020-07-07 PROCEDURE — 99397 PER PM REEVAL EST PAT 65+ YR: CPT | Mod: S$GLB,,, | Performed by: INTERNAL MEDICINE

## 2020-07-07 PROCEDURE — 3079F PR MOST RECENT DIASTOLIC BLOOD PRESSURE 80-89 MM HG: ICD-10-PCS | Mod: S$GLB,,, | Performed by: INTERNAL MEDICINE

## 2020-07-07 RX ORDER — EZETIMIBE 10 MG/1
10 TABLET ORAL DAILY
COMMUNITY

## 2020-07-07 RX ORDER — ROSUVASTATIN CALCIUM 20 MG/1
20 TABLET, COATED ORAL DAILY
COMMUNITY

## 2020-07-07 RX ORDER — HYDROCODONE BITARTRATE AND ACETAMINOPHEN 10; 325 MG/1; MG/1
1 TABLET ORAL EVERY 6 HOURS PRN
Qty: 12 TABLET | Refills: 0 | Status: SHIPPED | OUTPATIENT
Start: 2020-07-07 | End: 2020-07-30 | Stop reason: SDUPTHER

## 2020-07-07 NOTE — PROGRESS NOTES
Patient ID: William Manuel Jr.     Chief Complaint:   Chief Complaint   Patient presents with    Review labs        HPI: Wellness exam. Doing ok, but Migraines not too well Controlled- seeing Dr. EDMUND Morris. Labs done at [facility], but not available today. He restarted testosterone injections due to fatigue despite history of DVT due to him being on Xarelto.     Review of Systems   Constitutional: Negative.    HENT: Negative.    Eyes: Negative.    Respiratory: Negative.    Cardiovascular: Negative.    Gastrointestinal: Negative.    Endocrine: Negative.    Genitourinary: Negative.    Musculoskeletal: Negative.    Skin: Negative.    Allergic/Immunologic: Negative.    Neurological: Positive for headaches.   Hematological: Negative.    Psychiatric/Behavioral: Negative.           Objective:      Physical Exam   Physical Exam  Vitals signs and nursing note reviewed.   Constitutional:       Appearance: He is well-developed.   HENT:      Head: Normocephalic and atraumatic.      Nose: Nose normal.   Eyes:      Conjunctiva/sclera: Conjunctivae normal.      Pupils: Pupils are equal, round, and reactive to light.   Neck:      Musculoskeletal: Normal range of motion and neck supple.   Cardiovascular:      Rate and Rhythm: Normal rate and regular rhythm.      Heart sounds: Normal heart sounds.   Pulmonary:      Effort: Pulmonary effort is normal.      Breath sounds: Normal breath sounds.   Abdominal:      General: Bowel sounds are normal.      Palpations: Abdomen is soft.   Musculoskeletal: Normal range of motion.   Skin:     General: Skin is warm and dry.      Capillary Refill: Capillary refill takes less than 2 seconds.   Neurological:      Mental Status: He is alert and oriented to person, place, and time.   Psychiatric:         Behavior: Behavior normal.         Thought Content: Thought content normal.         Judgment: Judgment normal.         Current Outpatient Medications:     amLODIPine-benazepriL (LOTREL) 10-40 mg per  capsule, Take 1 capsule by mouth once daily., Disp: 90 capsule, Rfl: 3    aspirin 81 MG Chew, Take 325 mg by mouth once daily. , Disp: , Rfl:     clonazePAM (KLONOPIN) 0.5 MG tablet, Take 1 tablet (0.5 mg total) by mouth once daily., Disp: 90 tablet, Rfl: 1    etodolac (LODINE) 400 MG tablet, Take 1 tablet (400 mg total) by mouth once daily., Disp: 90 tablet, Rfl: 0    ezetimibe (ZETIA) 10 mg tablet, Take 10 mg by mouth once daily., Disp: , Rfl:     furosemide (LASIX) 40 MG tablet, Take 1 tablet (40 mg total) by mouth once daily., Disp: 90 tablet, Rfl: 3    metoprolol succinate (TOPROL-XL) 100 MG 24 hr tablet, Take 1 tablet (100 mg total) by mouth once daily., Disp: 90 tablet, Rfl: 3    omeprazole (PRILOSEC) 40 MG capsule, Take 1 capsule (40 mg total) by mouth once daily., Disp: 90 capsule, Rfl: 3    promethazine (PHENERGAN) 25 MG tablet, promethazine 25 mg tablet  1 po q 6 hours prn nausea, Disp: , Rfl:     rivaroxaban (XARELTO ORAL), Xarelto, Disp: , Rfl:     rosuvastatin (CRESTOR) 20 MG tablet, Take 20 mg by mouth once daily., Disp: , Rfl:     tamsulosin (FLOMAX) 0.4 mg Cap, Take 1 capsule (0.4 mg total) by mouth once daily., Disp: 90 capsule, Rfl: 3    testosterone cypionate (DEPOTESTOTERONE CYPIONATE) 100 mg/mL injection, Inject 1mL into the muscle every 14 days., Disp: 5 mL, Rfl: 3    HYDROcodone-acetaminophen (NORCO)  mg per tablet, Take 1 tablet by mouth every 6 (six) hours as needed for Pain., Disp: 12 tablet, Rfl: 0    promethazine (PHENERGAN) 25 MG tablet, Take 1 tablet (25 mg total) by mouth every 6 (six) hours as needed for Nausea. (Patient not taking: Reported on 7/7/2020), Disp: 20 tablet, Rfl: 0    Protective Sensation (w/ 10 gram monofilament):  Right: Intact  Left: Intact    Visual Inspection:  Normal -  Bilateral    Pedal Pulses:   Right: Present  Left: Present    Posterior tibialis:   Right:Present  Left: Present           Vitals:   Vitals:    07/07/20 1616   BP: 110/86    Pulse: 81   Temp: 98.9 °F (37.2 °C)   TempSrc: Oral   SpO2: (!) 92%   Weight: 122.4 kg (269 lb 13.5 oz)   Height: 6' (1.829 m)      Assessment:       Patient Active Problem List    Diagnosis Date Noted    Other headache syndrome 07/07/2020    Uncontrolled type 2 diabetes mellitus with circulatory disorder, without long-term current use of insulin 06/05/2020    Morbid (severe) obesity due to excess calories 07/18/2019    Opiate withdrawal 07/18/2019    Vasospasm 07/18/2019    Class 2 obesity due to excess calories with body mass index (BMI) of 36.0 to 36.9 in adult 07/18/2019    VONNIE on CPAP     Hypertension     GERD without esophagitis     BPH (benign prostatic hyperplasia)     Anxiety     Unstable angina 05/31/2019    Renal insufficiency 05/31/2019    Benign prostatic hyperplasia with incomplete bladder emptying 03/13/2018    Acute urinary retention 03/13/2018    Acute prostatitis 03/13/2018    Right lower quadrant abdominal pain 03/11/2018    Flank pain, acute 03/11/2018    Migraine headache     H/O heart bypass surgery     Diabetes mellitus     Coronary artery disease     Claustrophobia     Arthritis     Anticoagulant long-term use     Sepsis secondary to UTI 03/10/2018    Spondylosis without myelopathy or radiculopathy, cervical region 05/23/2016    Essential hypertension 05/11/2016    Type 2 diabetes mellitus 05/11/2016    Sleep apnea 05/11/2016    Coronary artery disease involving native coronary artery of native heart without angina pectoris 05/11/2016    Polycythemia 05/11/2016    Acute lower GI bleeding 05/11/2016    Dyslipidemia 05/11/2016    Gastroesophageal reflux disease without esophagitis 05/11/2016    Bright red rectal bleeding 05/11/2016    Hypogonadism in male 05/11/2016    Migraine without aura and without status migrainosus, not intractable 05/11/2016    Primary osteoarthritis involving multiple joints 05/11/2016          Plan:       William Manuel    was seen today for follow-up and may need lab work.    Diagnoses and all orders for this visit:    William was seen today for review labs.    Diagnoses and all orders for this visit:    Wellness examination    Other headache syndrome  -     HYDROcodone-acetaminophen (NORCO)  mg per tablet; Take 1 tablet by mouth every 6 (six) hours as needed for Pain.    GERD without esophagitis  Controlled     Essential hypertension  Controlled    Uncontrolled type 2 diabetes mellitus with circulatory disorder, without long-term current use of insulin  Check labs      Hypogonadism in male  Monitor

## 2020-07-14 NOTE — TELEPHONE ENCOUNTER
----- Message from Pauline Koenig sent at 7/14/2020 11:03 AM CDT -----  Contact: Wife Sabra  Type:  RX Refill Request    Who Called:  Sabra   Refill or New Rx:  New RX  RX Name and Strength:  etodolac (LODINE) 400 MG tablet  How is the patient currently taking it? (ex. 1XDay):  up to 3 tabs a day  Is this a 30 day or 90 day RX:  90  Preferred Pharmacy with phone number:    [facility] - [place] [address]  Phone: [phone] Fax: [phone]  Local or Mail Order:  Mail Order   Ordering Provider:  Dr Sung Juarez Call Back Number:  [phone] (home)   Additional Information:  The prescription that was sent over the directions were incorrect.  Thanks

## 2020-07-15 RX ORDER — ETODOLAC 400 MG/1
400 TABLET, FILM COATED ORAL DAILY
Qty: 90 TABLET | Refills: 3 | Status: SHIPPED | OUTPATIENT
Start: 2020-07-15 | End: 2020-09-30

## 2020-07-20 ENCOUNTER — TELEPHONE (OUTPATIENT)
Dept: FAMILY MEDICINE | Facility: CLINIC | Age: 70
End: 2020-07-20

## 2020-07-20 NOTE — TELEPHONE ENCOUNTER
I reviewed his labs from [place] on 7/2020. LDL Controlled and less than 70, but A1C 8.3. As far as I can see, he's not taking any meds for diabetes mellitus. Let's do some kind of office visit to discuss meds, etc...

## 2020-07-21 ENCOUNTER — PATIENT OUTREACH (OUTPATIENT)
Dept: ADMINISTRATIVE | Facility: HOSPITAL | Age: 70
End: 2020-07-21

## 2020-07-21 DIAGNOSIS — E11.9 TYPE 2 DIABETES MELLITUS WITHOUT COMPLICATION, UNSPECIFIED WHETHER LONG TERM INSULIN USE: Primary | ICD-10-CM

## 2020-07-21 NOTE — PROGRESS NOTES
Health Maintenance Due   Topic Date Due    Hepatitis C Screening  1950    Eye Exam  1959    TETANUS VACCINE  1967    Sign Pain Contract  1967    Complete Opioid Risk Tool  1967    Urine Drug Screen  1967    Naloxone Prescription  1967    Shingles Vaccine (2 of 3) 2015    Hemoglobin A1c  2019       Chart review completed 2020.  Care Everywhere updates requested and reviewed.  Immunizations reconciled. Media reports reviewed.  Duplicate HM overrides and  orders removed.  Overdue HM topic chart audit and/or requested.  Overdue lab testing linked to upcoming lab appointments if applies.

## 2020-07-21 NOTE — LETTER
July 21, 2020    William Manuel JrJonas  [address]  [place] [address]             [facility]  [address]  [facility] [address]  Phone: [phone] Dear Mr. Manuel:    [facility] is committed to your overall health.  To help you get the most out of each of your visits, we will review your information to make sure you are up to date on all of your recommended tests and/or procedures.      Dr. Zach Almaraz MD has found that your chart shows you may be due for     Health Maintenance Due   Topic    Hepatitis C Screening     Eye Exam     TETANUS VACCINE     Shingles Vaccine (2 of 3)    Hemoglobin A1c        Future Appointments   Date Time Provider Department Center   7/30/2020  3:20 PM Zach Almaraz MD [facility]   1/8/2021  9:20 AM Zach Almaraz MD [facility]     If you have had any of the above done at another facility, please bring the records or information with you so that your record at [facility] will be complete.  If you would like to schedule any of these, please contact me.    If you are currently taking medication, please bring it with you to your appointment for review.    Also, if you have any type of Advanced Directives, please bring them with you to your office visit so we may scan them into your chart.    MD Wanedr Vargas LPN Clinical Care Coordinator  [facility]  [address] [facility] [address]  [place] [address]  P: [phone]  F: [phone]

## 2020-07-27 DIAGNOSIS — F41.9 ANXIETY: ICD-10-CM

## 2020-07-27 RX ORDER — CLONAZEPAM 0.5 MG/1
0.5 TABLET ORAL DAILY
Qty: 90 TABLET | Refills: 1 | Status: SHIPPED | OUTPATIENT
Start: 2020-07-27 | End: 2021-03-04 | Stop reason: SDUPTHER

## 2020-07-30 ENCOUNTER — OFFICE VISIT (OUTPATIENT)
Dept: FAMILY MEDICINE | Facility: CLINIC | Age: 70
End: 2020-07-30
Payer: MEDICARE

## 2020-07-30 ENCOUNTER — HOSPITAL ENCOUNTER (OUTPATIENT)
Dept: RADIOLOGY | Facility: HOSPITAL | Age: 70
Discharge: HOME | End: 2020-07-30
Attending: INTERNAL MEDICINE
Payer: MEDICARE

## 2020-07-30 VITALS
HEIGHT: 72 IN | BODY MASS INDEX: 36.64 KG/M2 | SYSTOLIC BLOOD PRESSURE: 104 MMHG | DIASTOLIC BLOOD PRESSURE: 70 MMHG | HEART RATE: 69 BPM | OXYGEN SATURATION: 97 % | TEMPERATURE: 98 F | WEIGHT: 270.5 LBS

## 2020-07-30 DIAGNOSIS — G44.89 OTHER HEADACHE SYNDROME: ICD-10-CM

## 2020-07-30 DIAGNOSIS — I10 ESSENTIAL HYPERTENSION: ICD-10-CM

## 2020-07-30 DIAGNOSIS — R10.31 RIGHT LOWER QUADRANT ABDOMINAL PAIN: ICD-10-CM

## 2020-07-30 DIAGNOSIS — R53.83 FATIGUE, UNSPECIFIED TYPE: ICD-10-CM

## 2020-07-30 DIAGNOSIS — G47.01 INSOMNIA DUE TO MEDICAL CONDITION: ICD-10-CM

## 2020-07-30 PROCEDURE — 3008F BODY MASS INDEX DOCD: CPT | Mod: S$GLB,,, | Performed by: INTERNAL MEDICINE

## 2020-07-30 PROCEDURE — 99999 PR PBB SHADOW E&M-EST. PATIENT-LVL IV: CPT | Mod: PBBFAC,,, | Performed by: INTERNAL MEDICINE

## 2020-07-30 PROCEDURE — 1159F PR MEDICATION LIST DOCUMENTED IN MEDICAL RECORD: ICD-10-PCS | Mod: S$GLB,,, | Performed by: INTERNAL MEDICINE

## 2020-07-30 PROCEDURE — 74176 CT ABD & PELVIS W/O CONTRAST: CPT | Mod: TC,PO

## 2020-07-30 PROCEDURE — 74176 CT RENAL STONE STUDY ABD PELVIS WO: ICD-10-PCS | Mod: 26,,, | Performed by: RADIOLOGY

## 2020-07-30 PROCEDURE — 3074F SYST BP LT 130 MM HG: CPT | Mod: S$GLB,,, | Performed by: INTERNAL MEDICINE

## 2020-07-30 PROCEDURE — 3008F PR BODY MASS INDEX (BMI) DOCUMENTED: ICD-10-PCS | Mod: S$GLB,,, | Performed by: INTERNAL MEDICINE

## 2020-07-30 PROCEDURE — 1125F PR PAIN SEVERITY QUANTIFIED, PAIN PRESENT: ICD-10-PCS | Mod: S$GLB,,, | Performed by: INTERNAL MEDICINE

## 2020-07-30 PROCEDURE — 74176 CT ABD & PELVIS W/O CONTRAST: CPT | Mod: 26,,, | Performed by: RADIOLOGY

## 2020-07-30 PROCEDURE — 1125F AMNT PAIN NOTED PAIN PRSNT: CPT | Mod: S$GLB,,, | Performed by: INTERNAL MEDICINE

## 2020-07-30 PROCEDURE — 3078F PR MOST RECENT DIASTOLIC BLOOD PRESSURE < 80 MM HG: ICD-10-PCS | Mod: S$GLB,,, | Performed by: INTERNAL MEDICINE

## 2020-07-30 PROCEDURE — 3074F PR MOST RECENT SYSTOLIC BLOOD PRESSURE < 130 MM HG: ICD-10-PCS | Mod: S$GLB,,, | Performed by: INTERNAL MEDICINE

## 2020-07-30 PROCEDURE — 99214 OFFICE O/P EST MOD 30 MIN: CPT | Mod: S$GLB,,, | Performed by: INTERNAL MEDICINE

## 2020-07-30 PROCEDURE — 1101F PR PT FALLS ASSESS DOC 0-1 FALLS W/OUT INJ PAST YR: ICD-10-PCS | Mod: S$GLB,,, | Performed by: INTERNAL MEDICINE

## 2020-07-30 PROCEDURE — 3078F DIAST BP <80 MM HG: CPT | Mod: S$GLB,,, | Performed by: INTERNAL MEDICINE

## 2020-07-30 PROCEDURE — 1101F PT FALLS ASSESS-DOCD LE1/YR: CPT | Mod: S$GLB,,, | Performed by: INTERNAL MEDICINE

## 2020-07-30 PROCEDURE — 1159F MED LIST DOCD IN RCRD: CPT | Mod: S$GLB,,, | Performed by: INTERNAL MEDICINE

## 2020-07-30 PROCEDURE — 99999 PR PBB SHADOW E&M-EST. PATIENT-LVL IV: ICD-10-PCS | Mod: PBBFAC,,, | Performed by: INTERNAL MEDICINE

## 2020-07-30 PROCEDURE — 99214 PR OFFICE/OUTPT VISIT, EST, LEVL IV, 30-39 MIN: ICD-10-PCS | Mod: S$GLB,,, | Performed by: INTERNAL MEDICINE

## 2020-07-30 RX ORDER — LANCETS
EACH MISCELLANEOUS
Qty: 200 EACH | Refills: 12 | Status: SHIPPED | OUTPATIENT
Start: 2020-07-30

## 2020-07-30 RX ORDER — INSULIN PUMP SYRINGE, 3 ML
EACH MISCELLANEOUS
Qty: 1 EACH | Refills: 0 | Status: SHIPPED | OUTPATIENT
Start: 2020-07-30 | End: 2022-09-26

## 2020-07-30 RX ORDER — HYDROCODONE BITARTRATE AND ACETAMINOPHEN 10; 325 MG/1; MG/1
1 TABLET ORAL EVERY 6 HOURS PRN
Qty: 12 TABLET | Refills: 0 | Status: SHIPPED | OUTPATIENT
Start: 2020-07-30 | End: 2020-08-12 | Stop reason: SDUPTHER

## 2020-07-30 RX ORDER — ZOLPIDEM TARTRATE 5 MG/1
5 TABLET ORAL NIGHTLY PRN
Qty: 30 TABLET | Refills: 3 | Status: SHIPPED | OUTPATIENT
Start: 2020-07-30 | End: 2020-08-10 | Stop reason: SDUPTHER

## 2020-07-30 NOTE — PROGRESS NOTES
Patient ID: William Manuel .     Chief Complaint:   Chief Complaint   Patient presents with    Follow-up        HPI: Follow up for labs that show A1C = 8.3 on no diabetes mellitus meds as of now. Declines Metformin due to history of kidney stones. Will try Januvia. Complains of 1 week of Right lower flank sharp or burning pain that comes and goes and lasts for 1-1.5 minutes. May be a kidney stone. Refill [place]. Also fell at home after sitting up in bed without injury. Also fatigued- needs labs. His Blood Pressure is too low- will take Lotrel and Lasix every other day. Also Complains of insomnia x many decades and will try Ambien 5 mg and Monitor.     Review of Systems   Constitutional: Negative.    HENT: Negative.    Eyes: Negative.    Respiratory: Positive for shortness of breath.    Cardiovascular: Negative.    Gastrointestinal: Negative.    Endocrine: Negative.    Genitourinary: Negative.    Musculoskeletal: Negative.    Skin: Negative.    Allergic/Immunologic: Negative.    Neurological: Negative.    Hematological: Negative.    Psychiatric/Behavioral: Negative.           Objective:      Physical Exam   Physical Exam  Vitals signs and nursing note reviewed.   Constitutional:       Appearance: Normal appearance. He is well-developed.   HENT:      Head: Normocephalic and atraumatic.      Nose: Nose normal.   Eyes:      Extraocular Movements: Extraocular movements intact.      Conjunctiva/sclera: Conjunctivae normal.      Pupils: Pupils are equal, round, and reactive to light.   Neck:      Musculoskeletal: Normal range of motion and neck supple.   Cardiovascular:      Rate and Rhythm: Normal rate and regular rhythm.      Heart sounds: Normal heart sounds.   Pulmonary:      Effort: Pulmonary effort is normal.      Breath sounds: Normal breath sounds.   Abdominal:      General: Bowel sounds are normal.      Palpations: Abdomen is soft.   Musculoskeletal: Normal range of motion.   Skin:     General: Skin is  warm and dry.      Capillary Refill: Capillary refill takes less than 2 seconds.   Neurological:      General: No focal deficit present.      Mental Status: He is alert and oriented to person, place, and time.   Psychiatric:         Behavior: Behavior normal.         Thought Content: Thought content normal.         Judgment: Judgment normal.       Current Outpatient Medications:     amLODIPine-benazepriL (LOTREL) 10-40 mg per capsule, Take 1 capsule by mouth once daily., Disp: 90 capsule, Rfl: 3    aspirin 81 MG Chew, Take 325 mg by mouth once daily. , Disp: , Rfl:     clonazePAM (KLONOPIN) 0.5 MG tablet, Take 1 tablet (0.5 mg total) by mouth once daily., Disp: 90 tablet, Rfl: 1    etodolac (LODINE) 400 MG tablet, Take 1 tablet (400 mg total) by mouth once daily. UP TO three times daily, Disp: 90 tablet, Rfl: 3    ezetimibe (ZETIA) 10 mg tablet, Take 10 mg by mouth once daily., Disp: , Rfl:     furosemide (LASIX) 40 MG tablet, Take 1 tablet (40 mg total) by mouth once daily., Disp: 90 tablet, Rfl: 3    metoprolol succinate (TOPROL-XL) 100 MG 24 hr tablet, Take 1 tablet (100 mg total) by mouth once daily., Disp: 90 tablet, Rfl: 3    omeprazole (PRILOSEC) 40 MG capsule, Take 1 capsule (40 mg total) by mouth once daily., Disp: 90 capsule, Rfl: 3    promethazine (PHENERGAN) 25 MG tablet, Take 1 tablet (25 mg total) by mouth every 6 (six) hours as needed for Nausea., Disp: 20 tablet, Rfl: 0    promethazine (PHENERGAN) 25 MG tablet, promethazine 25 mg tablet  1 po q 6 hours prn nausea, Disp: , Rfl:     rivaroxaban (XARELTO ORAL), Xarelto, Disp: , Rfl:     rosuvastatin (CRESTOR) 20 MG tablet, Take 20 mg by mouth once daily., Disp: , Rfl:     tamsulosin (FLOMAX) 0.4 mg Cap, Take 1 capsule (0.4 mg total) by mouth once daily., Disp: 90 capsule, Rfl: 3    testosterone cypionate (DEPOTESTOTERONE CYPIONATE) 100 mg/mL injection, Inject 1mL into the muscle every 14 days., Disp: 5 mL, Rfl: 3    blood sugar diagnostic  Strp, To check BG 2 times daily, to use with insurance preferred meter, Disp: 200 strip, Rfl: 12    blood-glucose meter kit, To check BG 2 times daily, to use with insurance preferred meter, Disp: 1 each, Rfl: 0    HYDROcodone-acetaminophen (NORCO)  mg per tablet, Take 1 tablet by mouth every 6 (six) hours as needed for Pain., Disp: 12 tablet, Rfl: 0    lancets Misc, To check BG 2 times daily, to use with insurance preferred meter, Disp: 200 each, Rfl: 12    SITagliptin (JANUVIA) 100 MG Tab, Take 1 tablet (100 mg total) by mouth once daily., Disp: 90 tablet, Rfl: 3    zolpidem (AMBIEN) 5 MG Tab, Take 1 tablet (5 mg total) by mouth nightly as needed., Disp: 30 tablet, Rfl: 3         Vitals:   Vitals:    07/30/20 1534   BP: 104/70   Pulse: 69   Temp: 98.3 °F (36.8 °C)   SpO2: 97%   Weight: 122.7 kg (270 lb 8.1 oz)   Height: 6' (1.829 m)      Assessment:       Patient Active Problem List    Diagnosis Date Noted    Insomnia due to medical condition 07/30/2020    Fatigue 07/30/2020    Other headache syndrome 07/07/2020    Uncontrolled type 2 diabetes mellitus with circulatory disorder, without long-term current use of insulin 06/05/2020    Morbid (severe) obesity due to excess calories 07/18/2019    Opiate withdrawal 07/18/2019    Vasospasm 07/18/2019    Class 2 obesity due to excess calories with body mass index (BMI) of 36.0 to 36.9 in adult 07/18/2019    VONNIE on CPAP     Hypertension     GERD without esophagitis     BPH (benign prostatic hyperplasia)     Anxiety     Unstable angina 05/31/2019    Renal insufficiency 05/31/2019    Benign prostatic hyperplasia with incomplete bladder emptying 03/13/2018    Acute urinary retention 03/13/2018    Acute prostatitis 03/13/2018    Right lower quadrant abdominal pain 03/11/2018    Flank pain, acute 03/11/2018    Migraine headache     H/O heart bypass surgery     Diabetes mellitus     Coronary artery disease     Claustrophobia     Arthritis      Anticoagulant long-term use     Sepsis secondary to UTI 03/10/2018    Spondylosis without myelopathy or radiculopathy, cervical region 05/23/2016    Essential hypertension 05/11/2016    Type 2 diabetes mellitus 05/11/2016    Sleep apnea 05/11/2016    Coronary artery disease involving native coronary artery of native heart without angina pectoris 05/11/2016    Polycythemia 05/11/2016    Acute lower GI bleeding 05/11/2016    Dyslipidemia 05/11/2016    Gastroesophageal reflux disease without esophagitis 05/11/2016    Bright red rectal bleeding 05/11/2016    Hypogonadism in male 05/11/2016    Migraine without aura and without status migrainosus, not intractable 05/11/2016    Primary osteoarthritis involving multiple joints 05/11/2016          Plan:       William JAYESH Manuel Jr.  was seen today for follow-up and may need lab work.    Diagnoses and all orders for this visit:    William was seen today for follow-up.    Diagnoses and all orders for this visit:    Uncontrolled type 2 diabetes mellitus with circulatory disorder, without long-term current use of insulin  -     SITagliptin (JANUVIA) 100 MG Tab; Take 1 tablet (100 mg total) by mouth once daily.  -     blood-glucose meter kit; To check BG 2 times daily, to use with insurance preferred meter  -     lancets Misc; To check BG 2 times daily, to use with insurance preferred meter  -     blood sugar diagnostic Strp; To check BG 2 times daily, to use with insurance preferred meter    Right lower quadrant abdominal pain  -     CT Renal Stone Study ABD Pelvis WO; Future  -     HYDROcodone-acetaminophen (NORCO)  mg per tablet; Take 1 tablet by mouth every 6 (six) hours as needed for Pain.    Other headache syndrome  Per Dr. Morris    Fatigue, unspecified type  -     CBC auto differential; Future  -     Comprehensive metabolic panel; Future  -     Hepatitis C Antibody; Future  Should improve as Blood Pressure increases    Insomnia due to medical  condition  -     zolpidem (AMBIEN) 5 MG Tab; Take 1 tablet (5 mg total) by mouth nightly as needed.    Essential hypertension  Overmedicated- take Lotrel and Lasix every other day

## 2020-07-31 ENCOUNTER — TELEPHONE (OUTPATIENT)
Dept: FAMILY MEDICINE | Facility: CLINIC | Age: 70
End: 2020-07-31

## 2020-07-31 NOTE — TELEPHONE ENCOUNTER
----- Message from Marleen Loza sent at 7/31/2020 10:21 AM CDT -----  Regarding: test results  Contact: patient  Type:  Test Results    Who Called:  Patient   Name of Test (Lab/Mammo/Etc):  bloodwork/CT  Date of Test:  July 30th  Ordering Provider:  Dr. Almaraz  Where the test was performed:  [facility] Call Back Number:  [phone] (home)     Case number [id number]

## 2020-08-04 ENCOUNTER — PATIENT OUTREACH (OUTPATIENT)
Dept: ADMINISTRATIVE | Facility: HOSPITAL | Age: 70
End: 2020-08-04

## 2020-08-04 NOTE — PROGRESS NOTES
Non-compliant report chart audits for Hemoglobin A1C. Chart review completed for HM test overdue (mammograms, Colonoscopies, pap smears, DM labs, and/or EYE EXAMs)  06/18/2020    Care Everywhere and media, updates requested and reviewed.        [facility] and [facility] reviewed.

## 2020-08-07 ENCOUNTER — TELEPHONE (OUTPATIENT)
Dept: FAMILY MEDICINE | Facility: CLINIC | Age: 70
End: 2020-08-07

## 2020-08-07 DIAGNOSIS — G47.01 INSOMNIA DUE TO MEDICAL CONDITION: ICD-10-CM

## 2020-08-07 NOTE — TELEPHONE ENCOUNTER
Callback to patient--patient states he was prescribed ambien 1 tablet and told to increase to 2 if needed.  Seems like the 2 tablets is working at this time

## 2020-08-07 NOTE — TELEPHONE ENCOUNTER
----- Message from Debbie Pelaez sent at 8/7/2020 12:11 PM CDT -----  Type: Patient Call Back       What is the request in detail:  pt calling to speak to a nurse regarding medication not helping.     Ambien      Can the clinic reply by [facility]? No       Would the patient rather a call back or a response via [facility]? Call back       Best call back number: [phone]

## 2020-08-10 RX ORDER — ZOLPIDEM TARTRATE 10 MG/1
10 TABLET ORAL NIGHTLY PRN
Qty: 30 TABLET | Refills: 3 | Status: SHIPPED | OUTPATIENT
Start: 2020-08-10 | End: 2020-08-31

## 2020-08-17 ENCOUNTER — PATIENT OUTREACH (OUTPATIENT)
Dept: ADMINISTRATIVE | Facility: HOSPITAL | Age: 70
End: 2020-08-17

## 2020-08-17 DIAGNOSIS — E29.1 HYPOGONADISM IN MALE: Primary | ICD-10-CM

## 2020-08-17 NOTE — PROGRESS NOTES
Chart review completed 2020.  Care Everywhere updates requested and reviewed.  Immunizations reconciled. Media reports reviewed.  Duplicate HM overrides and  orders removed.  Overdue HM topic chart audit and/or requested.  Overdue lab testing linked to upcoming lab appointments if applies.    Lab [facility], and [employer] reviewed.    Health Maintenance Due   Topic Date Due    Eye Exam  1960    TETANUS VACCINE  1967    Sign Pain Contract  1967    Complete Opioid Risk Tool  1967    Urine Drug Screen  1967    Naloxone Prescription  1967    Shingles Vaccine (2 of 3) 2015    Hemoglobin A1c  2019

## 2020-08-18 RX ORDER — TESTOSTERONE CYPIONATE 1000 MG/10ML
INJECTION, SOLUTION INTRAMUSCULAR
Qty: 5 ML | Refills: 3 | Status: SHIPPED | OUTPATIENT
Start: 2020-08-18 | End: 2020-08-20 | Stop reason: RX

## 2020-08-19 ENCOUNTER — TELEPHONE (OUTPATIENT)
Dept: FAMILY MEDICINE | Facility: CLINIC | Age: 70
End: 2020-08-19

## 2020-08-19 DIAGNOSIS — E29.1 HYPOGONADISM IN MALE: ICD-10-CM

## 2020-08-19 NOTE — TELEPHONE ENCOUNTER
----- Message from Corrie ROSAURA Jeffers sent at 8/19/2020  8:55 AM CDT -----  Contact: Eriberto Botello  Type: Needs Medical Advice  Who Called:  [facility] - [place], [place] - [address] [place] [address]  Phone: [phone] Fax: [phone]      Best Call Back Number: - invoice [phone]  Additional Information:requesting a call in regards to rx  testosterone cypionate (DEPOTESTOTERONE CYPIONATE) 100 mg/mL injection pharmacist wants to know if the patient can get the 200mg because they cant dispense the 100mg. Please advise if the strength and directions can be changed.

## 2020-08-20 ENCOUNTER — TELEPHONE (OUTPATIENT)
Dept: FAMILY MEDICINE | Facility: CLINIC | Age: 70
End: 2020-08-20

## 2020-08-20 RX ORDER — TESTOSTERONE CYPIONATE 200 MG/ML
100 INJECTION, SOLUTION INTRAMUSCULAR
Qty: 5 ML | Refills: 3 | Status: SHIPPED | OUTPATIENT
Start: 2020-08-20 | End: 2021-07-06 | Stop reason: SDUPTHER

## 2020-08-20 NOTE — TELEPHONE ENCOUNTER
----- Message from Janeth Dinero sent at 8/20/2020  9:08 AM CDT -----  Regarding[facility]  Contact: [place] with [facility]  [facility] called back they are asking for the status of the Testerone Inj  Call placed to pod.  Call back [phone] and ref is [id number]

## 2020-08-20 NOTE — TELEPHONE ENCOUNTER
Sp[facility] [place] [facility]. They only have 200 mg dose vial. Can they send him this and tell him to take only 0.5 ml? Please advise

## 2020-08-24 ENCOUNTER — DOCUMENTATION ONLY (OUTPATIENT)
Dept: FAMILY MEDICINE | Facility: CLINIC | Age: 70
End: 2020-08-24

## 2020-08-24 NOTE — PROGRESS NOTES
PA:  Testosterone Cypionate 200mg/ml solution     PA form faxed to [facility] ([phone]  ----------------------------  APPROVED  Valid 7/25/2020 - 8/24/2021

## 2020-08-25 ENCOUNTER — PATIENT OUTREACH (OUTPATIENT)
Dept: ADMINISTRATIVE | Facility: HOSPITAL | Age: 70
End: 2020-08-25

## 2020-08-26 ENCOUNTER — TELEPHONE (OUTPATIENT)
Dept: FAMILY MEDICINE | Facility: CLINIC | Age: 70
End: 2020-08-26

## 2020-08-27 ENCOUNTER — TELEPHONE (OUTPATIENT)
Dept: FAMILY MEDICINE | Facility: CLINIC | Age: 70
End: 2020-08-27

## 2020-08-27 DIAGNOSIS — R10.31 RIGHT LOWER QUADRANT ABDOMINAL PAIN: ICD-10-CM

## 2020-08-27 DIAGNOSIS — M54.9 DORSALGIA, UNSPECIFIED: ICD-10-CM

## 2020-08-27 NOTE — TELEPHONE ENCOUNTER
See my chart message. Pt still having pain in his right side. Pt seen by Urologist in [place]. Doctor did an Ultrasound, scan showed fluid cyst on both kidneys. But it was nothing that he needed to worry about. Pt also asking for more pain medication. Please advise.

## 2020-08-27 NOTE — TELEPHONE ENCOUNTER
----- Message from Luisa Santos sent at 8/27/2020  2:02 PM CDT -----  Type: Needs Medical Advice  Who Called:  Sabra Mar (Spouse)  Pharmacy name and phone #:   [id number] - [place], [place] - [address] [facility] [address] ([place] [address]  [address] [address]  Phone: [phone] Fax: [phone]  Best Call Back Number: [phone]  Additional Information: requesting a sleeping medication lunesta to sent to [facility]. States that the pt is still having pain in his side and wants to know if there is anything else that can be done. Please give call back. thanks

## 2020-08-28 RX ORDER — HYDROCODONE BITARTRATE AND ACETAMINOPHEN 10; 325 MG/1; MG/1
1 TABLET ORAL EVERY 6 HOURS PRN
Qty: 30 TABLET | Refills: 0 | Status: SHIPPED | OUTPATIENT
Start: 2020-08-28 | End: 2020-09-08 | Stop reason: SDUPTHER

## 2020-08-28 NOTE — TELEPHONE ENCOUNTER
I'm concerned about a few things: 1) we don't know what's causing this pain 2) the Hydrocodone doesn't seem to help it. I wonder if it's a pinched nerve coming from his back. I'll refill the Hydrocodone again and get a Lumbar Xray. Hopefully it'll show something.

## 2020-08-31 ENCOUNTER — OFFICE VISIT (OUTPATIENT)
Dept: FAMILY MEDICINE | Facility: CLINIC | Age: 70
End: 2020-08-31
Payer: MEDICARE

## 2020-08-31 VITALS
BODY MASS INDEX: 35.95 KG/M2 | DIASTOLIC BLOOD PRESSURE: 84 MMHG | HEART RATE: 82 BPM | TEMPERATURE: 98 F | HEIGHT: 72 IN | WEIGHT: 265.44 LBS | OXYGEN SATURATION: 95 % | SYSTOLIC BLOOD PRESSURE: 110 MMHG

## 2020-08-31 DIAGNOSIS — F40.240 CLAUSTROPHOBIA: ICD-10-CM

## 2020-08-31 DIAGNOSIS — R53.83 FATIGUE, UNSPECIFIED TYPE: ICD-10-CM

## 2020-08-31 DIAGNOSIS — G47.01 INSOMNIA DUE TO MEDICAL CONDITION: ICD-10-CM

## 2020-08-31 DIAGNOSIS — R10.31 RIGHT LOWER QUADRANT ABDOMINAL PAIN: Primary | ICD-10-CM

## 2020-08-31 DIAGNOSIS — M54.9 DORSALGIA, UNSPECIFIED: ICD-10-CM

## 2020-08-31 PROCEDURE — 3079F PR MOST RECENT DIASTOLIC BLOOD PRESSURE 80-89 MM HG: ICD-10-PCS | Mod: S$GLB,,, | Performed by: INTERNAL MEDICINE

## 2020-08-31 PROCEDURE — 99999 PR PBB SHADOW E&M-EST. PATIENT-LVL V: ICD-10-PCS | Mod: PBBFAC,,, | Performed by: INTERNAL MEDICINE

## 2020-08-31 PROCEDURE — 1159F PR MEDICATION LIST DOCUMENTED IN MEDICAL RECORD: ICD-10-PCS | Mod: S$GLB,,, | Performed by: INTERNAL MEDICINE

## 2020-08-31 PROCEDURE — 3074F SYST BP LT 130 MM HG: CPT | Mod: S$GLB,,, | Performed by: INTERNAL MEDICINE

## 2020-08-31 PROCEDURE — 1101F PR PT FALLS ASSESS DOC 0-1 FALLS W/OUT INJ PAST YR: ICD-10-PCS | Mod: S$GLB,,, | Performed by: INTERNAL MEDICINE

## 2020-08-31 PROCEDURE — 99214 PR OFFICE/OUTPT VISIT, EST, LEVL IV, 30-39 MIN: ICD-10-PCS | Mod: S$GLB,,, | Performed by: INTERNAL MEDICINE

## 2020-08-31 PROCEDURE — 3079F DIAST BP 80-89 MM HG: CPT | Mod: S$GLB,,, | Performed by: INTERNAL MEDICINE

## 2020-08-31 PROCEDURE — 1101F PT FALLS ASSESS-DOCD LE1/YR: CPT | Mod: S$GLB,,, | Performed by: INTERNAL MEDICINE

## 2020-08-31 PROCEDURE — 3008F BODY MASS INDEX DOCD: CPT | Mod: S$GLB,,, | Performed by: INTERNAL MEDICINE

## 2020-08-31 PROCEDURE — 3052F PR MOST RECENT HEMOGLOBIN A1C LEVEL 8.0 - < 9.0%: ICD-10-PCS | Mod: S$GLB,,, | Performed by: INTERNAL MEDICINE

## 2020-08-31 PROCEDURE — 99999 PR PBB SHADOW E&M-EST. PATIENT-LVL V: CPT | Mod: PBBFAC,,, | Performed by: INTERNAL MEDICINE

## 2020-08-31 PROCEDURE — 3074F PR MOST RECENT SYSTOLIC BLOOD PRESSURE < 130 MM HG: ICD-10-PCS | Mod: S$GLB,,, | Performed by: INTERNAL MEDICINE

## 2020-08-31 PROCEDURE — 1159F MED LIST DOCD IN RCRD: CPT | Mod: S$GLB,,, | Performed by: INTERNAL MEDICINE

## 2020-08-31 PROCEDURE — 3008F PR BODY MASS INDEX (BMI) DOCUMENTED: ICD-10-PCS | Mod: S$GLB,,, | Performed by: INTERNAL MEDICINE

## 2020-08-31 PROCEDURE — 99214 OFFICE O/P EST MOD 30 MIN: CPT | Mod: S$GLB,,, | Performed by: INTERNAL MEDICINE

## 2020-08-31 PROCEDURE — 3052F HG A1C>EQUAL 8.0%<EQUAL 9.0%: CPT | Mod: S$GLB,,, | Performed by: INTERNAL MEDICINE

## 2020-08-31 RX ORDER — DIAZEPAM 10 MG/1
10 TABLET ORAL
Qty: 1 TABLET | Refills: 0 | Status: SHIPPED | OUTPATIENT
Start: 2020-08-31 | End: 2020-09-30

## 2020-08-31 RX ORDER — TEMAZEPAM 30 MG/1
30 CAPSULE ORAL NIGHTLY PRN
Qty: 90 CAPSULE | Refills: 3 | Status: SHIPPED | OUTPATIENT
Start: 2020-08-31 | End: 2020-09-30

## 2020-08-31 RX ORDER — GLIPIZIDE 5 MG/1
5 TABLET ORAL
Qty: 180 TABLET | Refills: 3 | Status: SHIPPED | OUTPATIENT
Start: 2020-08-31 | End: 2021-08-19

## 2020-08-31 NOTE — PROGRESS NOTES
Patient ID: William Manuel .     Chief Complaint:   Chief Complaint   Patient presents with    Follow up diabetes medication    Right sided pain        HPI: Complains of persistent Right lower flank pain of unknown etiology. It's not coming from his kidney cyst or a kidney stone. It's a sharp pain localized to Right lower flank that is worse when laying flat. CT shows degenerative joint disease in the corresponding area- get MRI. [place] refilled. Needs replacements for Lunesta and Januvia due to cost. Will start Glipizide and Temazepam. Is sugars are MUCH improved due to avoiding ice cream. Will go back to taking Blood Pressure med and fluid med daily.      Review of Systems   Constitutional: Negative.    HENT: Negative.    Eyes: Negative.    Respiratory: Negative.    Cardiovascular: Negative.    Gastrointestinal: Negative.    Endocrine: Negative.    Genitourinary: Negative.    Musculoskeletal: Negative.    Skin: Negative.    Allergic/Immunologic: Negative.    Neurological: Negative.    Hematological: Negative.    Psychiatric/Behavioral: Negative.           Objective:      Physical Exam   Physical Exam  Vitals signs and nursing note reviewed.   Constitutional:       Appearance: He is well-developed. He is obese.   HENT:      Head: Normocephalic and atraumatic.      Nose: Nose normal.   Eyes:      Extraocular Movements: Extraocular movements intact.      Conjunctiva/sclera: Conjunctivae normal.      Pupils: Pupils are equal, round, and reactive to light.   Neck:      Musculoskeletal: Normal range of motion and neck supple.   Cardiovascular:      Rate and Rhythm: Normal rate and regular rhythm.      Pulses: Normal pulses.      Heart sounds: Normal heart sounds.   Pulmonary:      Effort: Pulmonary effort is normal.      Breath sounds: Normal breath sounds.   Abdominal:      General: Bowel sounds are normal.      Palpations: Abdomen is soft.   Musculoskeletal: Normal range of motion.   Skin:     General: Skin  is warm and dry.      Capillary Refill: Capillary refill takes less than 2 seconds.   Neurological:      General: No focal deficit present.      Mental Status: He is alert and oriented to person, place, and time.   Psychiatric:         Mood and Affect: Mood normal.         Behavior: Behavior normal.         Thought Content: Thought content normal.         Judgment: Judgment normal.          Current Outpatient Medications:     amLODIPine-benazepriL (LOTREL) 10-40 mg per capsule, Take 1 capsule by mouth once daily., Disp: 90 capsule, Rfl: 3    aspirin 81 MG Chew, Take 325 mg by mouth once daily. , Disp: , Rfl:     blood sugar diagnostic Strp, To check BG 2 times daily, to use with insurance preferred meter, Disp: 200 strip, Rfl: 12    blood-glucose meter kit, To check BG 2 times daily, to use with insurance preferred meter, Disp: 1 each, Rfl: 0    clonazePAM (KLONOPIN) 0.5 MG tablet, Take 1 tablet (0.5 mg total) by mouth once daily., Disp: 90 tablet, Rfl: 1    etodolac (LODINE) 400 MG tablet, Take 1 tablet (400 mg total) by mouth once daily. UP TO three times daily, Disp: 90 tablet, Rfl: 3    ezetimibe (ZETIA) 10 mg tablet, Take 10 mg by mouth once daily., Disp: , Rfl:     furosemide (LASIX) 40 MG tablet, Take 1 tablet (40 mg total) by mouth once daily., Disp: 90 tablet, Rfl: 3    HYDROcodone-acetaminophen (NORCO)  mg per tablet, Take 1 tablet by mouth every 6 (six) hours as needed for Pain., Disp: 30 tablet, Rfl: 0    lancets Misc, To check BG 2 times daily, to use with insurance preferred meter, Disp: 200 each, Rfl: 12    metoprolol succinate (TOPROL-XL) 100 MG 24 hr tablet, Take 1 tablet (100 mg total) by mouth once daily., Disp: 90 tablet, Rfl: 3    omeprazole (PRILOSEC) 40 MG capsule, Take 1 capsule (40 mg total) by mouth once daily., Disp: 90 capsule, Rfl: 3    promethazine (PHENERGAN) 25 MG tablet, Take 1 tablet (25 mg total) by mouth every 6 (six) hours as needed for Nausea., Disp: 20  tablet, Rfl: 0    promethazine (PHENERGAN) 25 MG tablet, promethazine 25 mg tablet  1 po q 6 hours prn nausea, Disp: , Rfl:     rivaroxaban (XARELTO ORAL), Xarelto, Disp: , Rfl:     rosuvastatin (CRESTOR) 20 MG tablet, Take 20 mg by mouth once daily., Disp: , Rfl:     SITagliptin (JANUVIA) 100 MG Tab, Take 1 tablet (100 mg total) by mouth once daily., Disp: 90 tablet, Rfl: 3    tamsulosin (FLOMAX) 0.4 mg Cap, Take 1 capsule (0.4 mg total) by mouth once daily., Disp: 90 capsule, Rfl: 3    testosterone cypionate (DEPOTESTOTERONE CYPIONATE) 200 mg/mL injection, Inject 0.5 mLs (100 mg total) into the muscle every 14 (fourteen) days., Disp: 5 mL, Rfl: 3    diazePAM (VALIUM) 10 MG Tab, Take 1 tablet (10 mg total) by mouth On call Procedure., Disp: 1 tablet, Rfl: 0    glipiZIDE (GLUCOTROL) 5 MG tablet, Take 1 tablet (5 mg total) by mouth 2 (two) times daily before meals., Disp: 180 tablet, Rfl: 3    temazepam (RESTORIL) 30 mg capsule, Take 1 capsule (30 mg total) by mouth nightly as needed for Insomnia., Disp: 90 capsule, Rfl: 3        Vitals:   Vitals:    08/31/20 1305   BP: 110/84   Pulse: 82   Temp: 98.4 °F (36.9 °C)   TempSrc: Oral   SpO2: 95%   Weight: 120.4 kg (265 lb 6.9 oz)   Height: 6' (1.829 m)      Assessment:       Patient Active Problem List    Diagnosis Date Noted    Insomnia due to medical condition 07/30/2020    Fatigue 07/30/2020    Other headache syndrome 07/07/2020    Uncontrolled type 2 diabetes mellitus with circulatory disorder, without long-term current use of insulin 06/05/2020    Morbid (severe) obesity due to excess calories 07/18/2019    Opiate withdrawal 07/18/2019    Vasospasm 07/18/2019    Class 2 obesity due to excess calories with body mass index (BMI) of 36.0 to 36.9 in adult 07/18/2019    VONNIE on CPAP     Hypertension     GERD without esophagitis     BPH (benign prostatic hyperplasia)     Anxiety     Unstable angina 05/31/2019    Renal insufficiency 05/31/2019     Benign prostatic hyperplasia with incomplete bladder emptying 03/13/2018    Acute urinary retention 03/13/2018    Acute prostatitis 03/13/2018    Right lower quadrant abdominal pain 03/11/2018    Flank pain, acute 03/11/2018    Migraine headache     H/O heart bypass surgery     Diabetes mellitus     Coronary artery disease     Claustrophobia     Arthritis     Anticoagulant long-term use     Sepsis secondary to UTI 03/10/2018    Spondylosis without myelopathy or radiculopathy, cervical region 05/23/2016    Essential hypertension 05/11/2016    Type 2 diabetes mellitus 05/11/2016    Sleep apnea 05/11/2016    Coronary artery disease involving native coronary artery of native heart without angina pectoris 05/11/2016    Polycythemia 05/11/2016    Acute lower GI bleeding 05/11/2016    Dyslipidemia 05/11/2016    Gastroesophageal reflux disease without esophagitis 05/11/2016    Bright red rectal bleeding 05/11/2016    Hypogonadism in male 05/11/2016    Migraine without aura and without status migrainosus, not intractable 05/11/2016    Primary osteoarthritis involving multiple joints 05/11/2016          Plan:       William Manuel Jr.  was seen today for follow-up and may need lab work.    Diagnoses and all orders for this visit:    William was seen today for follow up diabetes medication and right sided pain.    Diagnoses and all orders for this visit:    Right lower quadrant abdominal pain  -     MRI Lumbar Spine Without Contrast; Future    Insomnia due to medical condition  -     temazepam (RESTORIL) 30 mg capsule; Take 1 capsule (30 mg total) by mouth nightly as needed for Insomnia.    Fatigue, unspecified type    Uncontrolled type 2 diabetes mellitus with circulatory disorder, without long-term current use of insulin  -     glipiZIDE (GLUCOTROL) 5 MG tablet; Take 1 tablet (5 mg total) by mouth 2 (two) times daily before meals.    Claustrophobia  -     diazePAM (VALIUM) 10 MG Tab; Take 1  tablet (10 mg total) by mouth On call Procedure.    Dorsalgia, unspecified  -     MRI Lumbar Spine Without Contrast; Future

## 2020-09-04 ENCOUNTER — TELEPHONE (OUTPATIENT)
Dept: FAMILY MEDICINE | Facility: CLINIC | Age: 70
End: 2020-09-04

## 2020-09-04 NOTE — TELEPHONE ENCOUNTER
----- Message from Leonora Chau sent at 9/4/2020  8:50 AM CDT -----  Contact: pt  Type: Needs Medical Advice    Who Called:  rosi salinas    ref [phone]  Best Call Back Number: [phone]  Additional Information: Requesting a call back regarding a script temazepam (RESTORIL) 30 mg capsule duplicate therapy   Please Advise ---Thank you

## 2020-09-04 NOTE — TELEPHONE ENCOUNTER
Callback to [facility]    Temazepam is not covered under insurance--Lorazepam is covered without PA    Cover my meds will be entered for patient to get PA completed    Key# [id number]

## 2020-09-04 NOTE — TELEPHONE ENCOUNTER
----- Message from Leonora Chau sent at 9/4/2020 10:33 AM CDT -----  Contact: pt  Type:  Patient Returning Call    Who Called:  PT  Who Left Message for Patient:  nurse  Does the patient know what this is regarding?:  test resul  Best Call Back Number:  [phone]  Additional Information:  Please Advise ---Thank you

## 2020-09-08 ENCOUNTER — NURSE TRIAGE (OUTPATIENT)
Dept: ADMINISTRATIVE | Facility: CLINIC | Age: 70
End: 2020-09-08

## 2020-09-08 ENCOUNTER — OFFICE VISIT (OUTPATIENT)
Dept: FAMILY MEDICINE | Facility: CLINIC | Age: 70
End: 2020-09-08
Payer: MEDICARE

## 2020-09-08 ENCOUNTER — HOSPITAL ENCOUNTER (OUTPATIENT)
Dept: RADIOLOGY | Facility: HOSPITAL | Age: 70
Discharge: HOME | End: 2020-09-08
Attending: INTERNAL MEDICINE
Payer: MEDICARE

## 2020-09-08 VITALS
BODY MASS INDEX: 36.25 KG/M2 | OXYGEN SATURATION: 94 % | HEIGHT: 72 IN | HEART RATE: 84 BPM | DIASTOLIC BLOOD PRESSURE: 88 MMHG | SYSTOLIC BLOOD PRESSURE: 138 MMHG | WEIGHT: 267.63 LBS | TEMPERATURE: 99 F

## 2020-09-08 DIAGNOSIS — I10 ESSENTIAL HYPERTENSION: ICD-10-CM

## 2020-09-08 DIAGNOSIS — M54.50 CHRONIC BILATERAL LOW BACK PAIN, UNSPECIFIED WHETHER SCIATICA PRESENT: ICD-10-CM

## 2020-09-08 DIAGNOSIS — M54.31 SCIATIC NERVE PAIN, RIGHT: Primary | ICD-10-CM

## 2020-09-08 DIAGNOSIS — M54.9 DORSALGIA, UNSPECIFIED: ICD-10-CM

## 2020-09-08 DIAGNOSIS — R10.31 RIGHT LOWER QUADRANT ABDOMINAL PAIN: ICD-10-CM

## 2020-09-08 DIAGNOSIS — G89.29 CHRONIC BILATERAL LOW BACK PAIN, UNSPECIFIED WHETHER SCIATICA PRESENT: ICD-10-CM

## 2020-09-08 DIAGNOSIS — F41.9 ANXIETY: Primary | ICD-10-CM

## 2020-09-08 PROCEDURE — 3079F DIAST BP 80-89 MM HG: CPT | Mod: S$GLB,,, | Performed by: NURSE PRACTITIONER

## 2020-09-08 PROCEDURE — 72148 MRI LUMBAR SPINE WITHOUT CONTRAST: ICD-10-PCS | Mod: 26,,, | Performed by: RADIOLOGY

## 2020-09-08 PROCEDURE — 3075F SYST BP GE 130 - 139MM HG: CPT | Mod: S$GLB,,, | Performed by: NURSE PRACTITIONER

## 2020-09-08 PROCEDURE — 72148 MRI LUMBAR SPINE W/O DYE: CPT | Mod: TC,PO

## 2020-09-08 PROCEDURE — 99999 PR PBB SHADOW E&M-EST. PATIENT-LVL V: ICD-10-PCS | Mod: PBBFAC,,, | Performed by: NURSE PRACTITIONER

## 2020-09-08 PROCEDURE — 99212 PR OFFICE/OUTPT VISIT, EST, LEVL II, 10-19 MIN: ICD-10-PCS | Mod: S$GLB,,, | Performed by: NURSE PRACTITIONER

## 2020-09-08 PROCEDURE — 1101F PT FALLS ASSESS-DOCD LE1/YR: CPT | Mod: S$GLB,,, | Performed by: NURSE PRACTITIONER

## 2020-09-08 PROCEDURE — 3008F PR BODY MASS INDEX (BMI) DOCUMENTED: ICD-10-PCS | Mod: S$GLB,,, | Performed by: NURSE PRACTITIONER

## 2020-09-08 PROCEDURE — 1125F PR PAIN SEVERITY QUANTIFIED, PAIN PRESENT: ICD-10-PCS | Mod: S$GLB,,, | Performed by: NURSE PRACTITIONER

## 2020-09-08 PROCEDURE — 99999 PR PBB SHADOW E&M-EST. PATIENT-LVL V: CPT | Mod: PBBFAC,,, | Performed by: NURSE PRACTITIONER

## 2020-09-08 PROCEDURE — 1159F PR MEDICATION LIST DOCUMENTED IN MEDICAL RECORD: ICD-10-PCS | Mod: S$GLB,,, | Performed by: NURSE PRACTITIONER

## 2020-09-08 PROCEDURE — 3079F PR MOST RECENT DIASTOLIC BLOOD PRESSURE 80-89 MM HG: ICD-10-PCS | Mod: S$GLB,,, | Performed by: NURSE PRACTITIONER

## 2020-09-08 PROCEDURE — 72148 MRI LUMBAR SPINE W/O DYE: CPT | Mod: 26,,, | Performed by: RADIOLOGY

## 2020-09-08 PROCEDURE — 3008F BODY MASS INDEX DOCD: CPT | Mod: S$GLB,,, | Performed by: NURSE PRACTITIONER

## 2020-09-08 PROCEDURE — 1101F PR PT FALLS ASSESS DOC 0-1 FALLS W/OUT INJ PAST YR: ICD-10-PCS | Mod: S$GLB,,, | Performed by: NURSE PRACTITIONER

## 2020-09-08 PROCEDURE — 99212 OFFICE O/P EST SF 10 MIN: CPT | Mod: S$GLB,,, | Performed by: NURSE PRACTITIONER

## 2020-09-08 PROCEDURE — 1125F AMNT PAIN NOTED PAIN PRSNT: CPT | Mod: S$GLB,,, | Performed by: NURSE PRACTITIONER

## 2020-09-08 PROCEDURE — 1159F MED LIST DOCD IN RCRD: CPT | Mod: S$GLB,,, | Performed by: NURSE PRACTITIONER

## 2020-09-08 PROCEDURE — 3075F PR MOST RECENT SYSTOLIC BLOOD PRESS GE 130-139MM HG: ICD-10-PCS | Mod: S$GLB,,, | Performed by: NURSE PRACTITIONER

## 2020-09-08 RX ORDER — HYDROCODONE BITARTRATE AND ACETAMINOPHEN 10; 325 MG/1; MG/1
1 TABLET ORAL EVERY 6 HOURS PRN
Qty: 60 TABLET | Refills: 0 | Status: SHIPPED | OUTPATIENT
Start: 2020-09-08 | End: 2020-10-01 | Stop reason: SDUPTHER

## 2020-09-08 NOTE — PROGRESS NOTES
Subjective:       Patient ID: William Manuel Jr. is a 69 y.o. male.    Chief Complaint: Back Pain (goes down leg)  taking norco every 4-6 hours norco, out of norco, took last of norco.   Mr. Manuel is a new patient to me.  He is here to discuss his MRI results.  He is having active back pain.  He had an MRI ordered by Dr. Zach Almaraz and the MRI was completed about an hour ago, the MRI has not resulted as of this visit.  The patient is needing pain medication refilled at this time. He is currently taking norco 5-325 mg and is currently out of his pain medication.  He was advised that the nurse practitioner cannot refill pain medication, but the patient still went forth with scheduling the visit for today.    Vitals:    09/08/20 1425   BP: 138/88   Pulse: 84   Temp: 99.2 °F (37.3 °C)     Review of Systems   Constitutional: Negative.    HENT: Negative.    Eyes: Negative.    Respiratory: Negative.    Cardiovascular: Negative.    Gastrointestinal: Negative.    Endocrine: Negative.    Genitourinary: Negative.    Musculoskeletal: Positive for arthralgias, back pain, gait problem and myalgias. Negative for joint swelling, neck pain and neck stiffness.   Skin: Negative.    Allergic/Immunologic: Negative.    Neurological: Positive for weakness and headaches.   Hematological: Negative.    Psychiatric/Behavioral: Positive for sleep disturbance.       Past Medical History:   Diagnosis Date    Acute GI bleeding 2016    Anticoagulant long-term use     Anxiety     Arthritis     BPH (benign prostatic hyperplasia)     Claustrophobia     Coronary artery disease     Diabetes mellitus     GERD without esophagitis     H/O heart bypass surgery     Hypertension     Hypogonadism in male     Migraine headache     VONNIE on CPAP        Objective:      Physical Exam  Vitals signs and nursing note reviewed. Exam conducted with a chaperone present.   Constitutional:       Appearance: Normal appearance. He is obese.   HENT:       Head: Normocephalic and atraumatic.      Nose: Nose normal.      Mouth/Throat:      Mouth: Mucous membranes are moist.   Eyes:      Pupils: Pupils are equal, round, and reactive to light.   Neck:      Musculoskeletal: Normal range of motion and neck supple.   Cardiovascular:      Rate and Rhythm: Normal rate.      Heart sounds: Normal heart sounds.   Pulmonary:      Effort: Pulmonary effort is normal.      Breath sounds: Normal breath sounds.   Abdominal:      General: Bowel sounds are normal.      Palpations: Abdomen is soft.   Musculoskeletal:      Thoracic back: He exhibits tenderness and pain.      Lumbar back: He exhibits tenderness and pain.   Skin:     General: Skin is warm.   Neurological:      Mental Status: He is alert and oriented to person, place, and time.   Psychiatric:         Mood and Affect: Mood is anxious and depressed.         Behavior: Behavior is agitated. Behavior is cooperative.           Assessment:       1. Anxiety    2. Essential hypertension    3. Chronic bilateral low back pain, unspecified whether sciatica present        Plan:       1. Anxiety  Controlled with klonopin 0.5 mg once daily     2. Essential hypertension  Well managed and controlled on lotrel 10-40 mg once daily     BP Readings from Last 3 Encounters:   09/08/20 138/88   08/31/20 110/84   07/30/20 104/70       3. Chronic bilateral low back pain, unspecified whether sciatica present  MRI images have not resulted yet as of time visit to go over results with patient and to discuss treatment options.  Patient requesting pain medication, patient has active pain contract and pain medication cannot be filled by NP at this time.            Follow up in about 2 days (around 9/10/2020) for with Dr. Almaraz for MRI results .

## 2020-09-08 NOTE — TELEPHONE ENCOUNTER
I'm not sure if he can get an MRI sooner that Friday. Please ask radiology. I do think he has a pinched nerve causing all his problems.

## 2020-09-08 NOTE — TELEPHONE ENCOUNTER
Wanted to see if he get an mri done sooner than Friday. Was seen on 8/31/20 by dr. campos for a suspected pinched nerve. MRI scheduled for this Friday. Pain is worse and radiating down his left leg. Pain is so severe he can hardly move or sit. Holding his back when he gets up. Recommended he come to ED now for evaluation.    Reason for Disposition   SEVERE back pain of sudden onset and age > 60    Additional Information   Negative: Passed out (i.e., fainted, collapsed and was not responding)   Negative: Shock suspected (e.g., cold/pale/clammy skin, too weak to stand, low BP, rapid pulse)   Negative: Sounds like a life-threatening emergency to the triager    Protocols used: BACK PAIN-A-OH

## 2020-09-09 ENCOUNTER — TELEPHONE (OUTPATIENT)
Dept: FAMILY MEDICINE | Facility: CLINIC | Age: 70
End: 2020-09-09

## 2020-09-09 DIAGNOSIS — M51.36 LUMBAR DEGENERATIVE DISC DISEASE: Primary | ICD-10-CM

## 2020-09-09 NOTE — TELEPHONE ENCOUNTER
I reached out to Dr. Mika Morris (Patient sees Dr. Aren Morris) and he will get word to Dr. Andrew Jeffers that Mr. Manuel needs to be seen for a potential surgery. I have spoken to Patient and he agrees w/ this plan. He also stated that Gabapentin did not work in the past, so we'll stick with Norco for now. Sending referral to Dr. Andrew Jeffers.

## 2020-09-14 NOTE — PATIENT INSTRUCTIONS
Back Pain (Acute or Chronic)    Back pain is one of the most common problems. The good news is that most people feel better in 1 to 2 weeks, and most of the rest in 1 to 2 months. Most people can remain active.  People experience and describe pain differently; not everyone is the same.  · The pain can be sharp, stabbing, shooting, aching, cramping or burning.  · Movement, standing, bending, lifting, sitting, or walking may worsen pain.  · It can be localized to one spot or area, or it can be more generalized.  · It can spread or radiate upwards, to the front, or go down your arms or legs (sciatica).  · It can cause muscle spasm.  Most of the time, mechanical problems with the muscles or spine cause the pain. Mechanical problems are usually caused by an injury to the muscles or ligaments. While illness can cause back pain, it is usually not caused by a serious illness. Mechanical problems include:   · Physical activity such as sports, exercise, work, or normal activity  · Overexertion, lifting, pushing, pulling incorrectly or too aggressively  · Sudden twisting, bending, or stretching from an accident, or accidental movement  · Poor posture  · Stretching or moving wrong, without noticing pain at the time  · Poor coordination, lack of regular exercise (check with your doctor about this)  · Spinal disc disease or arthritis  · Stress  Pain can also be related to pregnancy, or illness like appendicitis, bladder or kidney infections, pelvic infections, and many other things.  Acute back pain usually gets better in 1 to 2 weeks. Back pain related to disk disease, arthritis in the spinal joints or spinal stenosis (narrowing of the spinal canal) can become chronic and last for months or years.  Unless you had a physical injury (for example, a car accident or fall) X-rays are usually not needed for the initial evaluation of back pain. If pain continues and does not respond to medical treatment, X-rays and other tests may be  needed.  Home care  Try these home care recommendations:  · When in bed, try to find a position of comfort. A firm mattress is best. Try lying flat on your back with pillows under your knees. You can also try lying on your side with your knees bent up towards your chest and a pillow between your knees.  · At first, do not try to stretch out the sore spots. If there is a strain, it is not like the good soreness you get after exercising without an injury. In this case, stretching may make it worse.  · Avoid prolong sitting, long car rides, or travel. This puts more stress on the lower back than standing or walking.  · During the first 24 to 72 hours after an acute injury or flare up of chronic back pain, apply an ice pack to the painful area for 20 minutes and then remove it for 20 minutes. Do this over a period of 60 to 90 minutes or several times a day. This will reduce swelling and pain. Wrap the ice pack in a thin towel or plastic to protect your skin.  · You can start with ice, then switch to heat. Heat (hot shower, hot bath, or heating pad) reduces pain and works well for muscle spasms. Heat can be applied to the painful area for 20 minutes then remove it for 20 minutes. Do this over a period of 60 to 90 minutes or several times a day. Do not sleep on a heating pad. It can lead to skin burns or tissue damage.  · You can alternate ice and heat therapy. Talk with your doctor about the best treatment for your back pain.  · Therapeutic massage can help relax the back muscles without stretching them.  · Be aware of safe lifting methods and do not lift anything without stretching first.  Medicines  Talk to your doctor before using medicine, especially if you have other medical problems or are taking other medicines.  · You may use over-the-counter medicine as directed on the bottle to control pain, unless another pain medicine was prescribed. If you have chronic conditions like diabetes, liver or kidney disease,  stomach ulcers, or gastrointestinal bleeding, or are taking blood thinners, talk to your doctor before taking any medicine.  · Be careful if you are given a prescription medicines, narcotics, or medicine for muscle spasms. They can cause drowsiness, affect your coordination, reflexes, and judgement. Do not drive or operate heavy machinery.  Follow-up care  Follow up with your healthcare provider, or as advised.   A radiologist will review any X-rays that were taken. Your provide will notify you of any new findings that may affect your care.  Call 911  Call emergency services if any of the following occur:  · Trouble breathing  · Confusion  · Very drowsy or trouble awakening  · Fainting or loss of consciousness  · Rapid or very slow heart rate  · Loss of bowel or bladder control  When to seek medical advice  Call your healthcare provider right away if any of these occur:   · Pain becomes worse or spreads to your legs  · Weakness or numbness in one or both legs  · Numbness in the groin or genital area  Date Last Reviewed: 7/1/2016  © 3938-5174 [employer]. [address] [place] [address], [place] [address]. All rights reserved. This information is not intended as a substitute for professional medical care. Always follow your healthcare professional's instructions.

## 2020-09-17 ENCOUNTER — TELEPHONE (OUTPATIENT)
Dept: FAMILY MEDICINE | Facility: CLINIC | Age: 70
End: 2020-09-17

## 2020-09-17 NOTE — TELEPHONE ENCOUNTER
----- Message from Ana Luisa Morrow MA sent at 9/17/2020  1:11 PM CDT -----  Regarding: Call back  Requesting a copy of the MRI on the report as well a disk. Pt  is scheduled at a different office for 9/29  MRI: 9/11  Call back # [phone]

## 2020-09-30 RX ORDER — ETODOLAC 400 MG/1
TABLET, FILM COATED ORAL
Qty: 90 TABLET | Refills: 3 | Status: SHIPPED | OUTPATIENT
Start: 2020-09-30 | End: 2022-09-26

## 2020-09-30 NOTE — PROGRESS NOTES
Refill Routing Note   Medication(s) are not appropriate for processing by [facility]:       - Non-participating provider           Medication reconciliation completed: No      Automatic Epic Generated Protocol Data:        Requested Prescriptions   Pending Prescriptions Disp Refills    etodolac (LODINE) 400 MG tablet [Pharmacy Med Name: ETODOLAC TABS 400MG] 90 tablet 3     Sig: TAKE 1 TABLET DAILY       NSAIDs Protocol Passed - 9/29/2020 11:28 PM        Passed - Serum Creatinine less than 1.4 on file in the past 12 months     Lab Results   Component Value Date    CREATININE 1.3 07/30/2020    CREATININE 1.12 06/01/2019    CREATININE 1.26 05/31/2019     Lab Results   Component Value Date    EGFRNONAA 55.3 (A) 07/30/2020    EGFRNONAA >60 06/01/2019    EGFRNONAA 58 (A) 05/31/2019               Passed - Visit with authorizing provider in past 12 months or upcoming 90 days        Passed - HGB greater than 10 or HCT greater than 30 in past 12 months        Passed - AST in past 12 months      Lab Results   Component Value Date    AST 19 07/30/2020    AST 42 05/31/2019    AST 20 03/13/2018              Passed - Serum Potassium less than 5.2 on file in the past 12 months     Lab Results   Component Value Date    K 4.5 07/30/2020    K 3.6 06/01/2019    K 3.8 05/31/2019                  Passed - Blood Pressure below 139/89 on file in past 12 months      BP Readings from Last 3 Encounters:   09/08/20 138/88   08/31/20 110/84   07/30/20 104/70             Passed - ALT less than 95 in past 12 months     Lab Results   Component Value Date    ALT 17 07/30/2020    ALT 24 05/31/2019    ALT 24 03/13/2018                    Appointments  past 12m or future 3m with PCP    Date Provider   Last Visit   8/31/2020 Zach Almaraz MD   Next Visit   1/8/2021 Zach Almaraz MD   ED visits in past 90 days: 0     Note composed:4:38 PM 09/30/2020

## 2020-10-01 DIAGNOSIS — M54.31 SCIATIC NERVE PAIN, RIGHT: ICD-10-CM

## 2020-10-01 NOTE — TELEPHONE ENCOUNTER
----- Message from Denia Hardy sent at 10/1/2020  4:02 PM CDT -----  Regarding: PT MESSAGE  Type: Needs Medical Advice  Who Called:  PT  Best Call Back Number: [phone]  Additional Information:PT IS REQUESTING A CALL BACK IN REGARDS TO PATIENT NOT BEING ABLE TO GET A PROCEDURE SCHEDULE. REQUESTING FOR ANOTHER RX TO BE SENT TO [facility] IN [place], [place] FOR PAIN MEDS LORTABS

## 2020-10-02 RX ORDER — HYDROCODONE BITARTRATE AND ACETAMINOPHEN 10; 325 MG/1; MG/1
1 TABLET ORAL EVERY 6 HOURS PRN
Qty: 60 TABLET | Refills: 0 | Status: SHIPPED | OUTPATIENT
Start: 2020-10-02 | End: 2021-03-22 | Stop reason: SDUPTHER

## 2020-10-02 NOTE — TELEPHONE ENCOUNTER
Callback to patient, did see Dr Jeffers and they are ordering some tests pending insurance authorization

## 2020-12-16 DIAGNOSIS — E11.9 TYPE 2 DIABETES MELLITUS WITHOUT COMPLICATION, UNSPECIFIED WHETHER LONG TERM INSULIN USE: ICD-10-CM

## 2021-01-09 ENCOUNTER — IMMUNIZATION (OUTPATIENT)
Dept: FAMILY MEDICINE | Facility: CLINIC | Age: 71
End: 2021-01-09
Payer: MEDICARE

## 2021-01-09 DIAGNOSIS — Z23 NEED FOR VACCINATION: ICD-10-CM

## 2021-01-09 PROCEDURE — 91300 COVID-19, MRNA, LNP-S, PF, 30 MCG/0.3 ML DOSE VACCINE: CPT | Mod: PBBFAC | Performed by: INTERNAL MEDICINE

## 2021-01-28 ENCOUNTER — PATIENT OUTREACH (OUTPATIENT)
Dept: ADMINISTRATIVE | Facility: HOSPITAL | Age: 71
End: 2021-01-28

## 2021-01-30 ENCOUNTER — IMMUNIZATION (OUTPATIENT)
Dept: FAMILY MEDICINE | Facility: CLINIC | Age: 71
End: 2021-01-30
Payer: MEDICARE

## 2021-01-30 DIAGNOSIS — Z23 NEED FOR VACCINATION: Primary | ICD-10-CM

## 2021-01-30 PROCEDURE — 91300 COVID-19, MRNA, LNP-S, PF, 30 MCG/0.3 ML DOSE VACCINE: CPT | Mod: PBBFAC | Performed by: INTERNAL MEDICINE

## 2021-01-30 PROCEDURE — 0002A COVID-19, MRNA, LNP-S, PF, 30 MCG/0.3 ML DOSE VACCINE: CPT | Mod: PBBFAC | Performed by: INTERNAL MEDICINE

## 2021-03-04 DIAGNOSIS — F41.9 ANXIETY: ICD-10-CM

## 2021-03-06 RX ORDER — CLONAZEPAM 0.5 MG/1
0.5 TABLET ORAL DAILY
Qty: 90 TABLET | Refills: 1 | Status: SHIPPED | OUTPATIENT
Start: 2021-03-06 | End: 2021-08-04 | Stop reason: SDUPTHER

## 2021-03-08 ENCOUNTER — PATIENT OUTREACH (OUTPATIENT)
Dept: ADMINISTRATIVE | Facility: HOSPITAL | Age: 71
End: 2021-03-08

## 2021-03-09 ENCOUNTER — TELEPHONE (OUTPATIENT)
Dept: FAMILY MEDICINE | Facility: CLINIC | Age: 71
End: 2021-03-09

## 2021-03-10 ENCOUNTER — TELEPHONE (OUTPATIENT)
Dept: FAMILY MEDICINE | Facility: CLINIC | Age: 71
End: 2021-03-10

## 2021-03-10 DIAGNOSIS — G47.01 INSOMNIA DUE TO MEDICAL CONDITION: Primary | ICD-10-CM

## 2021-03-11 RX ORDER — ZOLPIDEM TARTRATE 10 MG/1
10 TABLET ORAL NIGHTLY PRN
Qty: 30 TABLET | Refills: 3 | Status: SHIPPED | OUTPATIENT
Start: 2021-03-11 | End: 2021-06-29

## 2021-03-22 ENCOUNTER — OFFICE VISIT (OUTPATIENT)
Dept: FAMILY MEDICINE | Facility: CLINIC | Age: 71
End: 2021-03-22
Payer: MEDICARE

## 2021-03-22 VITALS
HEIGHT: 72 IN | HEART RATE: 65 BPM | SYSTOLIC BLOOD PRESSURE: 136 MMHG | DIASTOLIC BLOOD PRESSURE: 88 MMHG | BODY MASS INDEX: 35.27 KG/M2 | WEIGHT: 260.38 LBS | OXYGEN SATURATION: 96 %

## 2021-03-22 DIAGNOSIS — I25.10 CORONARY ARTERY DISEASE INVOLVING NATIVE CORONARY ARTERY OF NATIVE HEART WITHOUT ANGINA PECTORIS: ICD-10-CM

## 2021-03-22 DIAGNOSIS — Z12.5 ENCOUNTER FOR SCREENING FOR MALIGNANT NEOPLASM OF PROSTATE: ICD-10-CM

## 2021-03-22 DIAGNOSIS — M51.36 LUMBAR DEGENERATIVE DISC DISEASE: Primary | ICD-10-CM

## 2021-03-22 DIAGNOSIS — E66.01 MORBID (SEVERE) OBESITY DUE TO EXCESS CALORIES: ICD-10-CM

## 2021-03-22 DIAGNOSIS — G47.01 INSOMNIA DUE TO MEDICAL CONDITION: ICD-10-CM

## 2021-03-22 DIAGNOSIS — E29.1 HYPOGONADISM IN MALE: ICD-10-CM

## 2021-03-22 DIAGNOSIS — I10 ESSENTIAL HYPERTENSION: ICD-10-CM

## 2021-03-22 DIAGNOSIS — M54.31 SCIATIC NERVE PAIN, RIGHT: ICD-10-CM

## 2021-03-22 PROBLEM — M51.369 LUMBAR DEGENERATIVE DISC DISEASE: Status: ACTIVE | Noted: 2021-03-22

## 2021-03-22 PROCEDURE — 3075F SYST BP GE 130 - 139MM HG: CPT | Mod: S$GLB,,, | Performed by: INTERNAL MEDICINE

## 2021-03-22 PROCEDURE — 3052F PR MOST RECENT HEMOGLOBIN A1C LEVEL 8.0 - < 9.0%: ICD-10-PCS | Mod: S$GLB,,, | Performed by: INTERNAL MEDICINE

## 2021-03-22 PROCEDURE — 3008F PR BODY MASS INDEX (BMI) DOCUMENTED: ICD-10-PCS | Mod: S$GLB,,, | Performed by: INTERNAL MEDICINE

## 2021-03-22 PROCEDURE — 1157F PR ADVANCE CARE PLAN OR EQUIV PRESENT IN MEDICAL RECORD: ICD-10-PCS | Mod: S$GLB,,, | Performed by: INTERNAL MEDICINE

## 2021-03-22 PROCEDURE — 99999 PR PBB SHADOW E&M-EST. PATIENT-LVL V: CPT | Mod: PBBFAC,,, | Performed by: INTERNAL MEDICINE

## 2021-03-22 PROCEDURE — 99214 OFFICE O/P EST MOD 30 MIN: CPT | Mod: S$GLB,,, | Performed by: INTERNAL MEDICINE

## 2021-03-22 PROCEDURE — 1125F PR PAIN SEVERITY QUANTIFIED, PAIN PRESENT: ICD-10-PCS | Mod: S$GLB,,, | Performed by: INTERNAL MEDICINE

## 2021-03-22 PROCEDURE — 1157F ADVNC CARE PLAN IN RCRD: CPT | Mod: S$GLB,,, | Performed by: INTERNAL MEDICINE

## 2021-03-22 PROCEDURE — 3052F HG A1C>EQUAL 8.0%<EQUAL 9.0%: CPT | Mod: S$GLB,,, | Performed by: INTERNAL MEDICINE

## 2021-03-22 PROCEDURE — 3079F PR MOST RECENT DIASTOLIC BLOOD PRESSURE 80-89 MM HG: ICD-10-PCS | Mod: S$GLB,,, | Performed by: INTERNAL MEDICINE

## 2021-03-22 PROCEDURE — 1159F MED LIST DOCD IN RCRD: CPT | Mod: S$GLB,,, | Performed by: INTERNAL MEDICINE

## 2021-03-22 PROCEDURE — 1125F AMNT PAIN NOTED PAIN PRSNT: CPT | Mod: S$GLB,,, | Performed by: INTERNAL MEDICINE

## 2021-03-22 PROCEDURE — 3075F PR MOST RECENT SYSTOLIC BLOOD PRESS GE 130-139MM HG: ICD-10-PCS | Mod: S$GLB,,, | Performed by: INTERNAL MEDICINE

## 2021-03-22 PROCEDURE — 3008F BODY MASS INDEX DOCD: CPT | Mod: S$GLB,,, | Performed by: INTERNAL MEDICINE

## 2021-03-22 PROCEDURE — 1101F PR PT FALLS ASSESS DOC 0-1 FALLS W/OUT INJ PAST YR: ICD-10-PCS | Mod: S$GLB,,, | Performed by: INTERNAL MEDICINE

## 2021-03-22 PROCEDURE — 1159F PR MEDICATION LIST DOCUMENTED IN MEDICAL RECORD: ICD-10-PCS | Mod: S$GLB,,, | Performed by: INTERNAL MEDICINE

## 2021-03-22 PROCEDURE — 3288F PR FALLS RISK ASSESSMENT DOCUMENTED: ICD-10-PCS | Mod: S$GLB,,, | Performed by: INTERNAL MEDICINE

## 2021-03-22 PROCEDURE — 99999 PR PBB SHADOW E&M-EST. PATIENT-LVL V: ICD-10-PCS | Mod: PBBFAC,,, | Performed by: INTERNAL MEDICINE

## 2021-03-22 PROCEDURE — 3079F DIAST BP 80-89 MM HG: CPT | Mod: S$GLB,,, | Performed by: INTERNAL MEDICINE

## 2021-03-22 PROCEDURE — 99214 PR OFFICE/OUTPT VISIT, EST, LEVL IV, 30-39 MIN: ICD-10-PCS | Mod: S$GLB,,, | Performed by: INTERNAL MEDICINE

## 2021-03-22 PROCEDURE — 1101F PT FALLS ASSESS-DOCD LE1/YR: CPT | Mod: S$GLB,,, | Performed by: INTERNAL MEDICINE

## 2021-03-22 PROCEDURE — 3288F FALL RISK ASSESSMENT DOCD: CPT | Mod: S$GLB,,, | Performed by: INTERNAL MEDICINE

## 2021-03-22 RX ORDER — HYDROCODONE BITARTRATE AND ACETAMINOPHEN 10; 325 MG/1; MG/1
1 TABLET ORAL EVERY 6 HOURS PRN
Qty: 60 TABLET | Refills: 0 | Status: SHIPPED | OUTPATIENT
Start: 2021-03-22 | End: 2021-04-20 | Stop reason: SDUPTHER

## 2021-03-25 ENCOUNTER — PATIENT MESSAGE (OUTPATIENT)
Dept: FAMILY MEDICINE | Facility: CLINIC | Age: 71
End: 2021-03-25

## 2021-04-06 ENCOUNTER — PATIENT MESSAGE (OUTPATIENT)
Dept: ADMINISTRATIVE | Facility: HOSPITAL | Age: 71
End: 2021-04-06

## 2021-04-06 ENCOUNTER — PATIENT OUTREACH (OUTPATIENT)
Dept: ADMINISTRATIVE | Facility: OTHER | Age: 71
End: 2021-04-06

## 2021-04-08 ENCOUNTER — OFFICE VISIT (OUTPATIENT)
Dept: NEUROSURGERY | Facility: CLINIC | Age: 71
End: 2021-04-08
Payer: MEDICARE

## 2021-04-08 VITALS
HEART RATE: 69 BPM | DIASTOLIC BLOOD PRESSURE: 85 MMHG | BODY MASS INDEX: 35.27 KG/M2 | SYSTOLIC BLOOD PRESSURE: 127 MMHG | HEIGHT: 72 IN | WEIGHT: 260.38 LBS

## 2021-04-08 DIAGNOSIS — E66.09 CLASS 2 OBESITY DUE TO EXCESS CALORIES WITH BODY MASS INDEX (BMI) OF 35.0 TO 35.9 IN ADULT, UNSPECIFIED WHETHER SERIOUS COMORBIDITY PRESENT: Primary | ICD-10-CM

## 2021-04-08 DIAGNOSIS — M51.36 LUMBAR DEGENERATIVE DISC DISEASE: ICD-10-CM

## 2021-04-08 DIAGNOSIS — M47.819 FACET ARTHROPATHY OF SPINE: ICD-10-CM

## 2021-04-08 PROCEDURE — 99205 PR OFFICE/OUTPT VISIT, NEW, LEVL V, 60-74 MIN: ICD-10-PCS | Mod: S$GLB,,, | Performed by: STUDENT IN AN ORGANIZED HEALTH CARE EDUCATION/TRAINING PROGRAM

## 2021-04-08 PROCEDURE — 99999 PR PBB SHADOW E&M-EST. PATIENT-LVL V: ICD-10-PCS | Mod: PBBFAC,,, | Performed by: STUDENT IN AN ORGANIZED HEALTH CARE EDUCATION/TRAINING PROGRAM

## 2021-04-08 PROCEDURE — 1159F MED LIST DOCD IN RCRD: CPT | Mod: S$GLB,,, | Performed by: STUDENT IN AN ORGANIZED HEALTH CARE EDUCATION/TRAINING PROGRAM

## 2021-04-08 PROCEDURE — 1125F PR PAIN SEVERITY QUANTIFIED, PAIN PRESENT: ICD-10-PCS | Mod: S$GLB,,, | Performed by: STUDENT IN AN ORGANIZED HEALTH CARE EDUCATION/TRAINING PROGRAM

## 2021-04-08 PROCEDURE — 99999 PR PBB SHADOW E&M-EST. PATIENT-LVL V: CPT | Mod: PBBFAC,,, | Performed by: STUDENT IN AN ORGANIZED HEALTH CARE EDUCATION/TRAINING PROGRAM

## 2021-04-08 PROCEDURE — 99205 OFFICE O/P NEW HI 60 MIN: CPT | Mod: S$GLB,,, | Performed by: STUDENT IN AN ORGANIZED HEALTH CARE EDUCATION/TRAINING PROGRAM

## 2021-04-08 PROCEDURE — 3008F PR BODY MASS INDEX (BMI) DOCUMENTED: ICD-10-PCS | Mod: S$GLB,,, | Performed by: STUDENT IN AN ORGANIZED HEALTH CARE EDUCATION/TRAINING PROGRAM

## 2021-04-08 PROCEDURE — 3288F PR FALLS RISK ASSESSMENT DOCUMENTED: ICD-10-PCS | Mod: S$GLB,,, | Performed by: STUDENT IN AN ORGANIZED HEALTH CARE EDUCATION/TRAINING PROGRAM

## 2021-04-08 PROCEDURE — 3008F BODY MASS INDEX DOCD: CPT | Mod: S$GLB,,, | Performed by: STUDENT IN AN ORGANIZED HEALTH CARE EDUCATION/TRAINING PROGRAM

## 2021-04-08 PROCEDURE — 3288F FALL RISK ASSESSMENT DOCD: CPT | Mod: S$GLB,,, | Performed by: STUDENT IN AN ORGANIZED HEALTH CARE EDUCATION/TRAINING PROGRAM

## 2021-04-08 PROCEDURE — 1101F PT FALLS ASSESS-DOCD LE1/YR: CPT | Mod: S$GLB,,, | Performed by: STUDENT IN AN ORGANIZED HEALTH CARE EDUCATION/TRAINING PROGRAM

## 2021-04-08 PROCEDURE — 1125F AMNT PAIN NOTED PAIN PRSNT: CPT | Mod: S$GLB,,, | Performed by: STUDENT IN AN ORGANIZED HEALTH CARE EDUCATION/TRAINING PROGRAM

## 2021-04-08 PROCEDURE — 1159F PR MEDICATION LIST DOCUMENTED IN MEDICAL RECORD: ICD-10-PCS | Mod: S$GLB,,, | Performed by: STUDENT IN AN ORGANIZED HEALTH CARE EDUCATION/TRAINING PROGRAM

## 2021-04-08 PROCEDURE — 1101F PR PT FALLS ASSESS DOC 0-1 FALLS W/OUT INJ PAST YR: ICD-10-PCS | Mod: S$GLB,,, | Performed by: STUDENT IN AN ORGANIZED HEALTH CARE EDUCATION/TRAINING PROGRAM

## 2021-04-08 PROCEDURE — 1157F ADVNC CARE PLAN IN RCRD: CPT | Mod: S$GLB,,, | Performed by: STUDENT IN AN ORGANIZED HEALTH CARE EDUCATION/TRAINING PROGRAM

## 2021-04-08 PROCEDURE — 1157F PR ADVANCE CARE PLAN OR EQUIV PRESENT IN MEDICAL RECORD: ICD-10-PCS | Mod: S$GLB,,, | Performed by: STUDENT IN AN ORGANIZED HEALTH CARE EDUCATION/TRAINING PROGRAM

## 2021-04-12 ENCOUNTER — TELEPHONE (OUTPATIENT)
Dept: PAIN MEDICINE | Facility: CLINIC | Age: 71
End: 2021-04-12

## 2021-04-12 NOTE — TELEPHONE ENCOUNTER
----- Message from Janeth Dinero sent at 4/12/2021 10:59 AM CDT -----  Contact: pts wife Sabra  Type:  Sooner Apoointment Request    Name of Caller:  patient's wife Sabra     When is the first available appointment?      Symptoms:  Injection in his Back     Best Call Back Number:  [phone]    Additional Information:  please call back to make his appt this week as soon as possible.  He is not able to come in Thursday.  Please call back today.

## 2021-04-14 ENCOUNTER — TELEPHONE (OUTPATIENT)
Dept: PAIN MEDICINE | Facility: CLINIC | Age: 71
End: 2021-04-14

## 2021-04-20 ENCOUNTER — PATIENT MESSAGE (OUTPATIENT)
Dept: FAMILY MEDICINE | Facility: CLINIC | Age: 71
End: 2021-04-20

## 2021-04-20 DIAGNOSIS — M54.31 SCIATIC NERVE PAIN, RIGHT: ICD-10-CM

## 2021-04-20 RX ORDER — HYDROCODONE BITARTRATE AND ACETAMINOPHEN 10; 325 MG/1; MG/1
1 TABLET ORAL EVERY 6 HOURS PRN
Qty: 60 TABLET | Refills: 0 | Status: SHIPPED | OUTPATIENT
Start: 2021-04-20 | End: 2021-05-25 | Stop reason: SDUPTHER

## 2021-04-21 ENCOUNTER — TELEPHONE (OUTPATIENT)
Dept: PAIN MEDICINE | Facility: CLINIC | Age: 71
End: 2021-04-21

## 2021-04-21 DIAGNOSIS — M54.16 LUMBAR RADICULOPATHY: Primary | ICD-10-CM

## 2021-04-21 DIAGNOSIS — Z11.9 SCREENING EXAMINATION FOR INFECTIOUS DISEASE: ICD-10-CM

## 2021-04-21 RX ORDER — ALPRAZOLAM 0.5 MG/1
1 TABLET, ORALLY DISINTEGRATING ORAL ONCE AS NEEDED
Status: CANCELLED | OUTPATIENT
Start: 2021-05-07 | End: 2032-10-02

## 2021-04-27 ENCOUNTER — PATIENT MESSAGE (OUTPATIENT)
Dept: SURGERY | Facility: HOSPITAL | Age: 71
End: 2021-04-27

## 2021-05-06 ENCOUNTER — TELEPHONE (OUTPATIENT)
Dept: PAIN MEDICINE | Facility: CLINIC | Age: 71
End: 2021-05-06

## 2021-05-21 NOTE — TELEPHONE ENCOUNTER
----- Message from Karly Multani sent at 5/15/2018  9:18 AM CDT -----  Contact: Sabra  Patient's wife is calling regarding the scheduling of appointment at [facility] for injections in patient's head but has not heard from office. Please call [phone] or [phone]. Thanks!   
----- Message from RT Felice sent at 5/15/2018 10:28 AM CDT -----  Contact: Sabra,wife,[phone]   Sabra,wife,[phone], returned missed call concerning the pt's appt with Dr. Foy, karla.  
Returned call and spoke with patient. Patient was calling in reference to the Cryolysis. Please schedule patient with Dr. Friedman.   
See other message. Issue was already addressed with patient.   
How Severe Is Your Skin Lesion?: mild
Is This A New Presentation, Or A Follow-Up?: Skin Lesions
Which Family Member (Optional)?: Mother

## 2021-05-24 ENCOUNTER — TELEPHONE (OUTPATIENT)
Dept: NEUROSURGERY | Facility: CLINIC | Age: 71
End: 2021-05-24

## 2021-05-24 ENCOUNTER — PATIENT MESSAGE (OUTPATIENT)
Dept: FAMILY MEDICINE | Facility: CLINIC | Age: 71
End: 2021-05-24

## 2021-05-24 DIAGNOSIS — M54.31 SCIATIC NERVE PAIN, RIGHT: ICD-10-CM

## 2021-05-24 DIAGNOSIS — M54.9 DORSALGIA, UNSPECIFIED: ICD-10-CM

## 2021-05-25 RX ORDER — HYDROCODONE BITARTRATE AND ACETAMINOPHEN 10; 325 MG/1; MG/1
1 TABLET ORAL EVERY 6 HOURS PRN
Qty: 90 TABLET | Refills: 0 | Status: SHIPPED | OUTPATIENT
Start: 2021-05-25 | End: 2021-09-23

## 2021-06-01 ENCOUNTER — TELEPHONE (OUTPATIENT)
Dept: NEUROSURGERY | Facility: CLINIC | Age: 71
End: 2021-06-01

## 2021-06-09 ENCOUNTER — PATIENT OUTREACH (OUTPATIENT)
Dept: ADMINISTRATIVE | Facility: HOSPITAL | Age: 71
End: 2021-06-09

## 2021-06-09 ENCOUNTER — PATIENT MESSAGE (OUTPATIENT)
Dept: ADMINISTRATIVE | Facility: HOSPITAL | Age: 71
End: 2021-06-09

## 2021-06-29 ENCOUNTER — TELEPHONE (OUTPATIENT)
Dept: NEUROSURGERY | Facility: CLINIC | Age: 71
End: 2021-06-29

## 2021-06-29 DIAGNOSIS — M47.819 FACET ARTHROPATHY OF SPINE: Primary | ICD-10-CM

## 2021-06-29 RX ORDER — DIAZEPAM 5 MG/1
5 TABLET ORAL ONCE AS NEEDED
Qty: 1 TABLET | Refills: 0 | Status: SHIPPED | OUTPATIENT
Start: 2021-06-29 | End: 2021-09-23

## 2021-07-06 ENCOUNTER — HOSPITAL ENCOUNTER (OUTPATIENT)
Dept: RADIOLOGY | Facility: HOSPITAL | Age: 71
Discharge: HOME | End: 2021-07-06
Attending: STUDENT IN AN ORGANIZED HEALTH CARE EDUCATION/TRAINING PROGRAM
Payer: MEDICARE

## 2021-07-06 ENCOUNTER — TELEPHONE (OUTPATIENT)
Dept: NEUROSURGERY | Facility: CLINIC | Age: 71
End: 2021-07-06

## 2021-07-06 ENCOUNTER — PATIENT OUTREACH (OUTPATIENT)
Dept: ADMINISTRATIVE | Facility: OTHER | Age: 71
End: 2021-07-06

## 2021-07-06 DIAGNOSIS — E29.1 HYPOGONADISM IN MALE: ICD-10-CM

## 2021-07-06 DIAGNOSIS — M54.9 DORSALGIA, UNSPECIFIED: ICD-10-CM

## 2021-07-06 DIAGNOSIS — M51.36 LUMBAR DEGENERATIVE DISC DISEASE: ICD-10-CM

## 2021-07-06 PROCEDURE — 72148 MRI LUMBAR SPINE W/O DYE: CPT | Mod: TC

## 2021-07-06 PROCEDURE — 72148 MRI LUMBAR SPINE WITHOUT CONTRAST: ICD-10-PCS | Mod: 26,,, | Performed by: RADIOLOGY

## 2021-07-06 PROCEDURE — 72110 X-RAY EXAM L-2 SPINE 4/>VWS: CPT | Mod: TC,FY

## 2021-07-06 PROCEDURE — 72110 X-RAY EXAM L-2 SPINE 4/>VWS: CPT | Mod: 26,,, | Performed by: RADIOLOGY

## 2021-07-06 PROCEDURE — 72110 XR LUMBAR SPINE AP AND LAT WITH FLEX/EXT: ICD-10-PCS | Mod: 26,,, | Performed by: RADIOLOGY

## 2021-07-06 PROCEDURE — 72148 MRI LUMBAR SPINE W/O DYE: CPT | Mod: 26,,, | Performed by: RADIOLOGY

## 2021-07-07 ENCOUNTER — OFFICE VISIT (OUTPATIENT)
Dept: NEUROSURGERY | Facility: CLINIC | Age: 71
End: 2021-07-07
Payer: MEDICARE

## 2021-07-07 ENCOUNTER — PATIENT MESSAGE (OUTPATIENT)
Dept: ADMINISTRATIVE | Facility: HOSPITAL | Age: 71
End: 2021-07-07

## 2021-07-07 VITALS
HEIGHT: 72 IN | HEART RATE: 75 BPM | SYSTOLIC BLOOD PRESSURE: 125 MMHG | WEIGHT: 255.06 LBS | BODY MASS INDEX: 34.55 KG/M2 | DIASTOLIC BLOOD PRESSURE: 89 MMHG

## 2021-07-07 DIAGNOSIS — M47.819 FACET ARTHROPATHY OF SPINE: ICD-10-CM

## 2021-07-07 DIAGNOSIS — M51.36 LUMBAR DEGENERATIVE DISC DISEASE: Primary | ICD-10-CM

## 2021-07-07 DIAGNOSIS — M54.50 NON-SPECIFIC LOW BACK PAIN: ICD-10-CM

## 2021-07-07 DIAGNOSIS — E66.09 CLASS 2 OBESITY DUE TO EXCESS CALORIES WITH BODY MASS INDEX (BMI) OF 35.0 TO 35.9 IN ADULT, UNSPECIFIED WHETHER SERIOUS COMORBIDITY PRESENT: Primary | ICD-10-CM

## 2021-07-07 PROCEDURE — 1125F PR PAIN SEVERITY QUANTIFIED, PAIN PRESENT: ICD-10-PCS | Mod: S$GLB,,, | Performed by: STUDENT IN AN ORGANIZED HEALTH CARE EDUCATION/TRAINING PROGRAM

## 2021-07-07 PROCEDURE — 1157F PR ADVANCE CARE PLAN OR EQUIV PRESENT IN MEDICAL RECORD: ICD-10-PCS | Mod: S$GLB,,, | Performed by: STUDENT IN AN ORGANIZED HEALTH CARE EDUCATION/TRAINING PROGRAM

## 2021-07-07 PROCEDURE — 1101F PR PT FALLS ASSESS DOC 0-1 FALLS W/OUT INJ PAST YR: ICD-10-PCS | Mod: S$GLB,,, | Performed by: STUDENT IN AN ORGANIZED HEALTH CARE EDUCATION/TRAINING PROGRAM

## 2021-07-07 PROCEDURE — 3288F PR FALLS RISK ASSESSMENT DOCUMENTED: ICD-10-PCS | Mod: S$GLB,,, | Performed by: STUDENT IN AN ORGANIZED HEALTH CARE EDUCATION/TRAINING PROGRAM

## 2021-07-07 PROCEDURE — 99215 OFFICE O/P EST HI 40 MIN: CPT | Mod: S$GLB,,, | Performed by: STUDENT IN AN ORGANIZED HEALTH CARE EDUCATION/TRAINING PROGRAM

## 2021-07-07 PROCEDURE — 1101F PT FALLS ASSESS-DOCD LE1/YR: CPT | Mod: S$GLB,,, | Performed by: STUDENT IN AN ORGANIZED HEALTH CARE EDUCATION/TRAINING PROGRAM

## 2021-07-07 PROCEDURE — 3008F BODY MASS INDEX DOCD: CPT | Mod: S$GLB,,, | Performed by: STUDENT IN AN ORGANIZED HEALTH CARE EDUCATION/TRAINING PROGRAM

## 2021-07-07 PROCEDURE — 1157F ADVNC CARE PLAN IN RCRD: CPT | Mod: S$GLB,,, | Performed by: STUDENT IN AN ORGANIZED HEALTH CARE EDUCATION/TRAINING PROGRAM

## 2021-07-07 PROCEDURE — 99999 PR PBB SHADOW E&M-EST. PATIENT-LVL IV: ICD-10-PCS | Mod: PBBFAC,,, | Performed by: STUDENT IN AN ORGANIZED HEALTH CARE EDUCATION/TRAINING PROGRAM

## 2021-07-07 PROCEDURE — 99215 PR OFFICE/OUTPT VISIT, EST, LEVL V, 40-54 MIN: ICD-10-PCS | Mod: S$GLB,,, | Performed by: STUDENT IN AN ORGANIZED HEALTH CARE EDUCATION/TRAINING PROGRAM

## 2021-07-07 PROCEDURE — 1159F MED LIST DOCD IN RCRD: CPT | Mod: S$GLB,,, | Performed by: STUDENT IN AN ORGANIZED HEALTH CARE EDUCATION/TRAINING PROGRAM

## 2021-07-07 PROCEDURE — 1125F AMNT PAIN NOTED PAIN PRSNT: CPT | Mod: S$GLB,,, | Performed by: STUDENT IN AN ORGANIZED HEALTH CARE EDUCATION/TRAINING PROGRAM

## 2021-07-07 PROCEDURE — 1159F PR MEDICATION LIST DOCUMENTED IN MEDICAL RECORD: ICD-10-PCS | Mod: S$GLB,,, | Performed by: STUDENT IN AN ORGANIZED HEALTH CARE EDUCATION/TRAINING PROGRAM

## 2021-07-07 PROCEDURE — 99999 PR PBB SHADOW E&M-EST. PATIENT-LVL IV: CPT | Mod: PBBFAC,,, | Performed by: STUDENT IN AN ORGANIZED HEALTH CARE EDUCATION/TRAINING PROGRAM

## 2021-07-07 PROCEDURE — 3288F FALL RISK ASSESSMENT DOCD: CPT | Mod: S$GLB,,, | Performed by: STUDENT IN AN ORGANIZED HEALTH CARE EDUCATION/TRAINING PROGRAM

## 2021-07-07 PROCEDURE — 3008F PR BODY MASS INDEX (BMI) DOCUMENTED: ICD-10-PCS | Mod: S$GLB,,, | Performed by: STUDENT IN AN ORGANIZED HEALTH CARE EDUCATION/TRAINING PROGRAM

## 2021-07-08 RX ORDER — TESTOSTERONE CYPIONATE 200 MG/ML
100 INJECTION, SOLUTION INTRAMUSCULAR
Qty: 5 ML | Refills: 3 | Status: ON HOLD | OUTPATIENT
Start: 2021-07-08 | End: 2023-01-03 | Stop reason: HOSPADM

## 2021-08-04 ENCOUNTER — PATIENT MESSAGE (OUTPATIENT)
Dept: ADMINISTRATIVE | Facility: HOSPITAL | Age: 71
End: 2021-08-04

## 2021-08-04 DIAGNOSIS — F41.9 ANXIETY: ICD-10-CM

## 2021-08-04 RX ORDER — ETODOLAC 400 MG/1
TABLET, FILM COATED ORAL
Qty: 90 TABLET | Refills: 3 | OUTPATIENT
Start: 2021-08-04

## 2021-08-05 RX ORDER — CLONAZEPAM 0.5 MG/1
0.5 TABLET ORAL DAILY
Qty: 90 TABLET | Refills: 1 | Status: SHIPPED | OUTPATIENT
Start: 2021-08-05 | End: 2021-10-27 | Stop reason: SDUPTHER

## 2021-09-23 ENCOUNTER — OFFICE VISIT (OUTPATIENT)
Dept: FAMILY MEDICINE | Facility: CLINIC | Age: 71
End: 2021-09-23
Payer: MEDICARE

## 2021-09-23 VITALS
WEIGHT: 266.13 LBS | HEIGHT: 72 IN | OXYGEN SATURATION: 96 % | SYSTOLIC BLOOD PRESSURE: 114 MMHG | HEART RATE: 68 BPM | BODY MASS INDEX: 36.04 KG/M2 | DIASTOLIC BLOOD PRESSURE: 78 MMHG

## 2021-09-23 DIAGNOSIS — G89.29 CHRONIC MIDLINE LOW BACK PAIN WITH RIGHT-SIDED SCIATICA: Primary | ICD-10-CM

## 2021-09-23 DIAGNOSIS — I25.10 CORONARY ARTERY DISEASE INVOLVING NATIVE CORONARY ARTERY OF NATIVE HEART WITHOUT ANGINA PECTORIS: ICD-10-CM

## 2021-09-23 DIAGNOSIS — M54.41 CHRONIC MIDLINE LOW BACK PAIN WITH RIGHT-SIDED SCIATICA: Primary | ICD-10-CM

## 2021-09-23 PROCEDURE — 1160F RVW MEDS BY RX/DR IN RCRD: CPT | Mod: S$GLB,,, | Performed by: INTERNAL MEDICINE

## 2021-09-23 PROCEDURE — 4010F PR ACE/ARB THEARPY RXD/TAKEN: ICD-10-PCS | Mod: S$GLB,,, | Performed by: INTERNAL MEDICINE

## 2021-09-23 PROCEDURE — 4010F ACE/ARB THERAPY RXD/TAKEN: CPT | Mod: S$GLB,,, | Performed by: INTERNAL MEDICINE

## 2021-09-23 PROCEDURE — 3078F PR MOST RECENT DIASTOLIC BLOOD PRESSURE < 80 MM HG: ICD-10-PCS | Mod: S$GLB,,, | Performed by: INTERNAL MEDICINE

## 2021-09-23 PROCEDURE — 99999 PR PBB SHADOW E&M-EST. PATIENT-LVL IV: ICD-10-PCS | Mod: PBBFAC,,, | Performed by: INTERNAL MEDICINE

## 2021-09-23 PROCEDURE — 1101F PT FALLS ASSESS-DOCD LE1/YR: CPT | Mod: S$GLB,,, | Performed by: INTERNAL MEDICINE

## 2021-09-23 PROCEDURE — 1159F MED LIST DOCD IN RCRD: CPT | Mod: S$GLB,,, | Performed by: INTERNAL MEDICINE

## 2021-09-23 PROCEDURE — 1159F PR MEDICATION LIST DOCUMENTED IN MEDICAL RECORD: ICD-10-PCS | Mod: S$GLB,,, | Performed by: INTERNAL MEDICINE

## 2021-09-23 PROCEDURE — 1101F PR PT FALLS ASSESS DOC 0-1 FALLS W/OUT INJ PAST YR: ICD-10-PCS | Mod: S$GLB,,, | Performed by: INTERNAL MEDICINE

## 2021-09-23 PROCEDURE — 3008F BODY MASS INDEX DOCD: CPT | Mod: S$GLB,,, | Performed by: INTERNAL MEDICINE

## 2021-09-23 PROCEDURE — 3074F SYST BP LT 130 MM HG: CPT | Mod: S$GLB,,, | Performed by: INTERNAL MEDICINE

## 2021-09-23 PROCEDURE — 1157F ADVNC CARE PLAN IN RCRD: CPT | Mod: S$GLB,,, | Performed by: INTERNAL MEDICINE

## 2021-09-23 PROCEDURE — 1160F PR REVIEW ALL MEDS BY PRESCRIBER/CLIN PHARMACIST DOCUMENTED: ICD-10-PCS | Mod: S$GLB,,, | Performed by: INTERNAL MEDICINE

## 2021-09-23 PROCEDURE — 3078F DIAST BP <80 MM HG: CPT | Mod: S$GLB,,, | Performed by: INTERNAL MEDICINE

## 2021-09-23 PROCEDURE — 99213 PR OFFICE/OUTPT VISIT, EST, LEVL III, 20-29 MIN: ICD-10-PCS | Mod: S$GLB,,, | Performed by: INTERNAL MEDICINE

## 2021-09-23 PROCEDURE — 99213 OFFICE O/P EST LOW 20 MIN: CPT | Mod: S$GLB,,, | Performed by: INTERNAL MEDICINE

## 2021-09-23 PROCEDURE — 3074F PR MOST RECENT SYSTOLIC BLOOD PRESSURE < 130 MM HG: ICD-10-PCS | Mod: S$GLB,,, | Performed by: INTERNAL MEDICINE

## 2021-09-23 PROCEDURE — 1157F PR ADVANCE CARE PLAN OR EQUIV PRESENT IN MEDICAL RECORD: ICD-10-PCS | Mod: S$GLB,,, | Performed by: INTERNAL MEDICINE

## 2021-09-23 PROCEDURE — 3008F PR BODY MASS INDEX (BMI) DOCUMENTED: ICD-10-PCS | Mod: S$GLB,,, | Performed by: INTERNAL MEDICINE

## 2021-09-23 PROCEDURE — 99999 PR PBB SHADOW E&M-EST. PATIENT-LVL IV: CPT | Mod: PBBFAC,,, | Performed by: INTERNAL MEDICINE

## 2021-09-23 PROCEDURE — 3288F PR FALLS RISK ASSESSMENT DOCUMENTED: ICD-10-PCS | Mod: S$GLB,,, | Performed by: INTERNAL MEDICINE

## 2021-09-23 PROCEDURE — 3288F FALL RISK ASSESSMENT DOCD: CPT | Mod: S$GLB,,, | Performed by: INTERNAL MEDICINE

## 2021-09-23 RX ORDER — HYDROCODONE BITARTRATE AND ACETAMINOPHEN 10; 325 MG/1; MG/1
1 TABLET ORAL EVERY 4 HOURS PRN
Qty: 30 TABLET | Refills: 0 | Status: SHIPPED | OUTPATIENT
Start: 2021-09-23 | End: 2021-10-04 | Stop reason: SDUPTHER

## 2021-09-23 RX ORDER — PRASUGREL 10 MG/1
10 TABLET, FILM COATED ORAL DAILY
COMMUNITY
End: 2022-09-26

## 2021-10-04 DIAGNOSIS — M54.41 CHRONIC MIDLINE LOW BACK PAIN WITH RIGHT-SIDED SCIATICA: ICD-10-CM

## 2021-10-04 DIAGNOSIS — G89.29 CHRONIC MIDLINE LOW BACK PAIN WITH RIGHT-SIDED SCIATICA: ICD-10-CM

## 2021-10-04 RX ORDER — HYDROCODONE BITARTRATE AND ACETAMINOPHEN 10; 325 MG/1; MG/1
1 TABLET ORAL EVERY 4 HOURS PRN
Qty: 30 TABLET | Refills: 0 | Status: SHIPPED | OUTPATIENT
Start: 2021-10-04 | End: 2021-10-21

## 2021-10-18 ENCOUNTER — PATIENT MESSAGE (OUTPATIENT)
Dept: ADMINISTRATIVE | Facility: HOSPITAL | Age: 71
End: 2021-10-18
Payer: MEDICARE

## 2021-10-21 ENCOUNTER — CLINICAL SUPPORT (OUTPATIENT)
Dept: FAMILY MEDICINE | Facility: CLINIC | Age: 71
End: 2021-10-21
Attending: INTERNAL MEDICINE
Payer: MEDICARE

## 2021-10-21 ENCOUNTER — OFFICE VISIT (OUTPATIENT)
Dept: FAMILY MEDICINE | Facility: CLINIC | Age: 71
End: 2021-10-21
Payer: MEDICARE

## 2021-10-21 VITALS
BODY MASS INDEX: 36.25 KG/M2 | HEIGHT: 72 IN | DIASTOLIC BLOOD PRESSURE: 84 MMHG | OXYGEN SATURATION: 95 % | SYSTOLIC BLOOD PRESSURE: 132 MMHG | HEART RATE: 64 BPM | WEIGHT: 267.63 LBS

## 2021-10-21 DIAGNOSIS — M54.41 CHRONIC MIDLINE LOW BACK PAIN WITH RIGHT-SIDED SCIATICA: Primary | ICD-10-CM

## 2021-10-21 DIAGNOSIS — I25.10 CORONARY ARTERY DISEASE INVOLVING NATIVE CORONARY ARTERY OF NATIVE HEART WITHOUT ANGINA PECTORIS: ICD-10-CM

## 2021-10-21 DIAGNOSIS — G89.29 CHRONIC MIDLINE LOW BACK PAIN WITH RIGHT-SIDED SCIATICA: Primary | ICD-10-CM

## 2021-10-21 DIAGNOSIS — F41.9 ANXIETY: ICD-10-CM

## 2021-10-21 DIAGNOSIS — Z86.711 HISTORY OF PULMONARY EMBOLISM: ICD-10-CM

## 2021-10-21 DIAGNOSIS — Z12.5 PROSTATE CANCER SCREENING: ICD-10-CM

## 2021-10-21 DIAGNOSIS — R05.9 COUGH: ICD-10-CM

## 2021-10-21 DIAGNOSIS — I10 ESSENTIAL HYPERTENSION: ICD-10-CM

## 2021-10-21 DIAGNOSIS — E29.1 HYPOGONADISM IN MALE: ICD-10-CM

## 2021-10-21 PROCEDURE — 92228 DIABETIC EYE SCREENING PHOTO: ICD-10-PCS | Mod: TC,S$GLB,, | Performed by: INTERNAL MEDICINE

## 2021-10-21 PROCEDURE — 1159F MED LIST DOCD IN RCRD: CPT | Mod: S$GLB,,, | Performed by: INTERNAL MEDICINE

## 2021-10-21 PROCEDURE — 3008F PR BODY MASS INDEX (BMI) DOCUMENTED: ICD-10-PCS | Mod: S$GLB,,, | Performed by: INTERNAL MEDICINE

## 2021-10-21 PROCEDURE — 99214 PR OFFICE/OUTPT VISIT, EST, LEVL IV, 30-39 MIN: ICD-10-PCS | Mod: S$GLB,,, | Performed by: INTERNAL MEDICINE

## 2021-10-21 PROCEDURE — 1101F PR PT FALLS ASSESS DOC 0-1 FALLS W/OUT INJ PAST YR: ICD-10-PCS | Mod: S$GLB,,, | Performed by: INTERNAL MEDICINE

## 2021-10-21 PROCEDURE — 1160F RVW MEDS BY RX/DR IN RCRD: CPT | Mod: S$GLB,,, | Performed by: INTERNAL MEDICINE

## 2021-10-21 PROCEDURE — 1157F ADVNC CARE PLAN IN RCRD: CPT | Mod: S$GLB,,, | Performed by: INTERNAL MEDICINE

## 2021-10-21 PROCEDURE — 3079F PR MOST RECENT DIASTOLIC BLOOD PRESSURE 80-89 MM HG: ICD-10-PCS | Mod: S$GLB,,, | Performed by: INTERNAL MEDICINE

## 2021-10-21 PROCEDURE — 1157F PR ADVANCE CARE PLAN OR EQUIV PRESENT IN MEDICAL RECORD: ICD-10-PCS | Mod: S$GLB,,, | Performed by: INTERNAL MEDICINE

## 2021-10-21 PROCEDURE — 92228 DIABETIC EYE SCREENING PHOTO: ICD-10-PCS | Mod: 26,S$GLB,, | Performed by: OPTOMETRIST

## 2021-10-21 PROCEDURE — 4010F ACE/ARB THERAPY RXD/TAKEN: CPT | Mod: S$GLB,,, | Performed by: INTERNAL MEDICINE

## 2021-10-21 PROCEDURE — 3288F PR FALLS RISK ASSESSMENT DOCUMENTED: ICD-10-PCS | Mod: S$GLB,,, | Performed by: INTERNAL MEDICINE

## 2021-10-21 PROCEDURE — 3288F FALL RISK ASSESSMENT DOCD: CPT | Mod: S$GLB,,, | Performed by: INTERNAL MEDICINE

## 2021-10-21 PROCEDURE — 3075F PR MOST RECENT SYSTOLIC BLOOD PRESS GE 130-139MM HG: ICD-10-PCS | Mod: S$GLB,,, | Performed by: INTERNAL MEDICINE

## 2021-10-21 PROCEDURE — 99999 PR PBB SHADOW E&M-EST. PATIENT-LVL III: CPT | Mod: PBBFAC,,, | Performed by: INTERNAL MEDICINE

## 2021-10-21 PROCEDURE — 1159F PR MEDICATION LIST DOCUMENTED IN MEDICAL RECORD: ICD-10-PCS | Mod: S$GLB,,, | Performed by: INTERNAL MEDICINE

## 2021-10-21 PROCEDURE — 99214 OFFICE O/P EST MOD 30 MIN: CPT | Mod: S$GLB,,, | Performed by: INTERNAL MEDICINE

## 2021-10-21 PROCEDURE — 92228 IMG RTA DETC/MNTR DS PHY/QHP: CPT | Mod: TC,S$GLB,, | Performed by: INTERNAL MEDICINE

## 2021-10-21 PROCEDURE — 1101F PT FALLS ASSESS-DOCD LE1/YR: CPT | Mod: S$GLB,,, | Performed by: INTERNAL MEDICINE

## 2021-10-21 PROCEDURE — 4010F PR ACE/ARB THEARPY RXD/TAKEN: ICD-10-PCS | Mod: S$GLB,,, | Performed by: INTERNAL MEDICINE

## 2021-10-21 PROCEDURE — 99999 PR PBB SHADOW E&M-EST. PATIENT-LVL III: ICD-10-PCS | Mod: PBBFAC,,, | Performed by: INTERNAL MEDICINE

## 2021-10-21 PROCEDURE — 3075F SYST BP GE 130 - 139MM HG: CPT | Mod: S$GLB,,, | Performed by: INTERNAL MEDICINE

## 2021-10-21 PROCEDURE — 3008F BODY MASS INDEX DOCD: CPT | Mod: S$GLB,,, | Performed by: INTERNAL MEDICINE

## 2021-10-21 PROCEDURE — 92228 IMG RTA DETC/MNTR DS PHY/QHP: CPT | Mod: 26,S$GLB,, | Performed by: OPTOMETRIST

## 2021-10-21 PROCEDURE — 1160F PR REVIEW ALL MEDS BY PRESCRIBER/CLIN PHARMACIST DOCUMENTED: ICD-10-PCS | Mod: S$GLB,,, | Performed by: INTERNAL MEDICINE

## 2021-10-21 PROCEDURE — 3079F DIAST BP 80-89 MM HG: CPT | Mod: S$GLB,,, | Performed by: INTERNAL MEDICINE

## 2021-10-21 RX ORDER — BENZONATATE 200 MG/1
200 CAPSULE ORAL 3 TIMES DAILY PRN
Qty: 30 CAPSULE | Refills: 1 | Status: SHIPPED | OUTPATIENT
Start: 2021-10-21 | End: 2021-10-31

## 2021-10-21 RX ORDER — OXYCODONE AND ACETAMINOPHEN 10; 325 MG/1; MG/1
1 TABLET ORAL EVERY 6 HOURS PRN
Qty: 120 TABLET | Refills: 0 | Status: SHIPPED | OUTPATIENT
Start: 2021-10-21 | End: 2021-11-19 | Stop reason: SDUPTHER

## 2021-10-27 ENCOUNTER — TELEPHONE (OUTPATIENT)
Dept: FAMILY MEDICINE | Facility: CLINIC | Age: 71
End: 2021-10-27
Payer: MEDICARE

## 2021-10-27 DIAGNOSIS — F41.9 ANXIETY: ICD-10-CM

## 2021-10-27 RX ORDER — CLONAZEPAM 0.5 MG/1
0.5 TABLET ORAL 2 TIMES DAILY
Qty: 180 TABLET | Refills: 1 | Status: SHIPPED | OUTPATIENT
Start: 2021-10-27 | End: 2022-04-27 | Stop reason: SDUPTHER

## 2021-11-19 DIAGNOSIS — M54.41 CHRONIC MIDLINE LOW BACK PAIN WITH RIGHT-SIDED SCIATICA: ICD-10-CM

## 2021-11-19 DIAGNOSIS — G89.29 CHRONIC MIDLINE LOW BACK PAIN WITH RIGHT-SIDED SCIATICA: ICD-10-CM

## 2021-11-19 RX ORDER — OXYCODONE AND ACETAMINOPHEN 10; 325 MG/1; MG/1
1 TABLET ORAL EVERY 6 HOURS PRN
Qty: 120 TABLET | Refills: 0 | Status: SHIPPED | OUTPATIENT
Start: 2021-11-19 | End: 2021-12-13 | Stop reason: SDUPTHER

## 2022-01-06 DIAGNOSIS — G47.01 INSOMNIA DUE TO MEDICAL CONDITION: ICD-10-CM

## 2022-01-06 RX ORDER — ZOLPIDEM TARTRATE 10 MG/1
TABLET ORAL
Qty: 30 TABLET | Refills: 5 | Status: SHIPPED | OUTPATIENT
Start: 2022-01-06 | End: 2022-06-30

## 2022-01-06 NOTE — TELEPHONE ENCOUNTER
No new care gaps identified.  Powered by [employer]. Reference number: [id number].   1/06/2022 9:40:34 AM CST

## 2022-01-14 ENCOUNTER — LAB VISIT (OUTPATIENT)
Dept: LAB | Facility: HOSPITAL | Age: 72
End: 2022-01-14
Attending: INTERNAL MEDICINE
Payer: MEDICARE

## 2022-01-14 DIAGNOSIS — Z12.5 PROSTATE CANCER SCREENING: ICD-10-CM

## 2022-01-14 DIAGNOSIS — E29.1 HYPOGONADISM IN MALE: ICD-10-CM

## 2022-01-14 DIAGNOSIS — I10 ESSENTIAL HYPERTENSION: ICD-10-CM

## 2022-01-14 LAB
ALBUMIN SERPL BCP-MCNC: 4.2 G/DL (ref 3.5–5.2)
ALBUMIN/CREAT UR: NORMAL UG/MG (ref 0–30)
ALP SERPL-CCNC: 71 U/L (ref 55–135)
ALT SERPL W/O P-5'-P-CCNC: 16 U/L (ref 10–44)
ANION GAP SERPL CALC-SCNC: 9 MMOL/L (ref 8–16)
AST SERPL-CCNC: 26 U/L (ref 10–40)
BASOPHILS # BLD AUTO: 0.13 K/UL (ref 0–0.2)
BASOPHILS NFR BLD: 2.1 % (ref 0–1.9)
BILIRUB SERPL-MCNC: 1 MG/DL (ref 0.1–1)
BUN SERPL-MCNC: 17 MG/DL (ref 8–23)
CALCIUM SERPL-MCNC: 9.7 MG/DL (ref 8.7–10.5)
CHLORIDE SERPL-SCNC: 102 MMOL/L (ref 95–110)
CHOLEST SERPL-MCNC: 110 MG/DL (ref 120–199)
CHOLEST/HDLC SERPL: 2.8 {RATIO} (ref 2–5)
CO2 SERPL-SCNC: 24 MMOL/L (ref 23–29)
COMPLEXED PSA SERPL-MCNC: <0.01 NG/ML (ref 0–4)
CREAT SERPL-MCNC: 1.2 MG/DL (ref 0.5–1.4)
CREAT UR-MCNC: 53 MG/DL (ref 23–375)
DIFFERENTIAL METHOD: ABNORMAL
EOSINOPHIL # BLD AUTO: 0.3 K/UL (ref 0–0.5)
EOSINOPHIL NFR BLD: 4.2 % (ref 0–8)
ERYTHROCYTE [DISTWIDTH] IN BLOOD BY AUTOMATED COUNT: 13.9 % (ref 11.5–14.5)
EST. GFR  (AFRICAN AMERICAN): >60 ML/MIN/1.73 M^2
EST. GFR  (NON AFRICAN AMERICAN): >60 ML/MIN/1.73 M^2
ESTIMATED AVG GLUCOSE: 180 MG/DL (ref 68–131)
GLUCOSE SERPL-MCNC: 168 MG/DL (ref 70–110)
HBA1C MFR BLD: 7.9 % (ref 4–5.6)
HCT VFR BLD AUTO: 52.7 % (ref 40–54)
HDLC SERPL-MCNC: 40 MG/DL (ref 40–75)
HDLC SERPL: 36.4 % (ref 20–50)
HGB BLD-MCNC: 17.3 G/DL (ref 14–18)
IMM GRANULOCYTES # BLD AUTO: 0.02 K/UL (ref 0–0.04)
IMM GRANULOCYTES NFR BLD AUTO: 0.3 % (ref 0–0.5)
LDLC SERPL CALC-MCNC: 30.2 MG/DL (ref 63–159)
LYMPHOCYTES # BLD AUTO: 2.1 K/UL (ref 1–4.8)
LYMPHOCYTES NFR BLD: 33.9 % (ref 18–48)
MCH RBC QN AUTO: 30.2 PG (ref 27–31)
MCHC RBC AUTO-ENTMCNC: 32.8 G/DL (ref 32–36)
MCV RBC AUTO: 92 FL (ref 82–98)
MICROALBUMIN UR DL<=1MG/L-MCNC: <5 UG/ML
MONOCYTES # BLD AUTO: 0.5 K/UL (ref 0.3–1)
MONOCYTES NFR BLD: 8.4 % (ref 4–15)
NEUTROPHILS # BLD AUTO: 3.2 K/UL (ref 1.8–7.7)
NEUTROPHILS NFR BLD: 51.1 % (ref 38–73)
NONHDLC SERPL-MCNC: 70 MG/DL
NRBC BLD-RTO: 0 /100 WBC
PLATELET # BLD AUTO: 197 K/UL (ref 150–450)
PMV BLD AUTO: 10.6 FL (ref 9.2–12.9)
POTASSIUM SERPL-SCNC: 4.3 MMOL/L (ref 3.5–5.1)
PROT SERPL-MCNC: 8.1 G/DL (ref 6–8.4)
RBC # BLD AUTO: 5.72 M/UL (ref 4.6–6.2)
SODIUM SERPL-SCNC: 135 MMOL/L (ref 136–145)
TESTOST SERPL-MCNC: 53 NG/DL (ref 304–1227)
TRIGL SERPL-MCNC: 199 MG/DL (ref 30–150)
WBC # BLD AUTO: 6.22 K/UL (ref 3.9–12.7)

## 2022-01-14 PROCEDURE — 83036 HEMOGLOBIN GLYCOSYLATED A1C: CPT | Performed by: INTERNAL MEDICINE

## 2022-01-14 PROCEDURE — 85025 COMPLETE CBC W/AUTO DIFF WBC: CPT | Performed by: INTERNAL MEDICINE

## 2022-01-14 PROCEDURE — 80061 LIPID PANEL: CPT | Performed by: INTERNAL MEDICINE

## 2022-01-14 PROCEDURE — 36415 COLL VENOUS BLD VENIPUNCTURE: CPT | Mod: PO | Performed by: INTERNAL MEDICINE

## 2022-01-14 PROCEDURE — 84153 ASSAY OF PSA TOTAL: CPT | Performed by: INTERNAL MEDICINE

## 2022-01-14 PROCEDURE — 80053 COMPREHEN METABOLIC PANEL: CPT | Performed by: INTERNAL MEDICINE

## 2022-01-14 PROCEDURE — 84403 ASSAY OF TOTAL TESTOSTERONE: CPT | Performed by: INTERNAL MEDICINE

## 2022-01-14 PROCEDURE — 82570 ASSAY OF URINE CREATININE: CPT | Performed by: INTERNAL MEDICINE

## 2022-01-21 ENCOUNTER — OFFICE VISIT (OUTPATIENT)
Dept: FAMILY MEDICINE | Facility: CLINIC | Age: 72
End: 2022-01-21
Payer: MEDICARE

## 2022-01-21 VITALS
WEIGHT: 267 LBS | OXYGEN SATURATION: 94 % | BODY MASS INDEX: 36.16 KG/M2 | SYSTOLIC BLOOD PRESSURE: 104 MMHG | DIASTOLIC BLOOD PRESSURE: 72 MMHG | HEIGHT: 72 IN | HEART RATE: 73 BPM

## 2022-01-21 DIAGNOSIS — I25.10 CORONARY ARTERY DISEASE INVOLVING NATIVE CORONARY ARTERY OF NATIVE HEART WITHOUT ANGINA PECTORIS: ICD-10-CM

## 2022-01-21 DIAGNOSIS — E11.59 TYPE 2 DIABETES MELLITUS WITH OTHER CIRCULATORY COMPLICATION, WITHOUT LONG-TERM CURRENT USE OF INSULIN: ICD-10-CM

## 2022-01-21 DIAGNOSIS — M54.41 CHRONIC MIDLINE LOW BACK PAIN WITH RIGHT-SIDED SCIATICA: ICD-10-CM

## 2022-01-21 DIAGNOSIS — E29.1 HYPOGONADISM IN MALE: ICD-10-CM

## 2022-01-21 DIAGNOSIS — I71.20 THORACIC AORTIC ANEURYSM, WITHOUT RUPTURE: ICD-10-CM

## 2022-01-21 DIAGNOSIS — I10 ESSENTIAL HYPERTENSION: Primary | ICD-10-CM

## 2022-01-21 DIAGNOSIS — E66.01 MORBID (SEVERE) OBESITY DUE TO EXCESS CALORIES: ICD-10-CM

## 2022-01-21 DIAGNOSIS — G89.29 CHRONIC MIDLINE LOW BACK PAIN WITH RIGHT-SIDED SCIATICA: ICD-10-CM

## 2022-01-21 PROCEDURE — 1101F PR PT FALLS ASSESS DOC 0-1 FALLS W/OUT INJ PAST YR: ICD-10-PCS | Mod: CPTII,S$GLB,, | Performed by: INTERNAL MEDICINE

## 2022-01-21 PROCEDURE — 1160F PR REVIEW ALL MEDS BY PRESCRIBER/CLIN PHARMACIST DOCUMENTED: ICD-10-PCS | Mod: CPTII,S$GLB,, | Performed by: INTERNAL MEDICINE

## 2022-01-21 PROCEDURE — 3074F SYST BP LT 130 MM HG: CPT | Mod: CPTII,S$GLB,, | Performed by: INTERNAL MEDICINE

## 2022-01-21 PROCEDURE — 3066F PR DOCUMENTATION OF TREATMENT FOR NEPHROPATHY: ICD-10-PCS | Mod: CPTII,S$GLB,, | Performed by: INTERNAL MEDICINE

## 2022-01-21 PROCEDURE — 3061F NEG MICROALBUMINURIA REV: CPT | Mod: CPTII,S$GLB,, | Performed by: INTERNAL MEDICINE

## 2022-01-21 PROCEDURE — 4010F ACE/ARB THERAPY RXD/TAKEN: CPT | Mod: CPTII,S$GLB,, | Performed by: INTERNAL MEDICINE

## 2022-01-21 PROCEDURE — 3008F BODY MASS INDEX DOCD: CPT | Mod: CPTII,S$GLB,, | Performed by: INTERNAL MEDICINE

## 2022-01-21 PROCEDURE — 3066F NEPHROPATHY DOC TX: CPT | Mod: CPTII,S$GLB,, | Performed by: INTERNAL MEDICINE

## 2022-01-21 PROCEDURE — 80307 DRUG TEST PRSMV CHEM ANLYZR: CPT | Performed by: INTERNAL MEDICINE

## 2022-01-21 PROCEDURE — 3074F PR MOST RECENT SYSTOLIC BLOOD PRESSURE < 130 MM HG: ICD-10-PCS | Mod: CPTII,S$GLB,, | Performed by: INTERNAL MEDICINE

## 2022-01-21 PROCEDURE — 3051F HG A1C>EQUAL 7.0%<8.0%: CPT | Mod: CPTII,S$GLB,, | Performed by: INTERNAL MEDICINE

## 2022-01-21 PROCEDURE — 3061F PR NEG MICROALBUMINURIA RESULT DOCUMENTED/REVIEW: ICD-10-PCS | Mod: CPTII,S$GLB,, | Performed by: INTERNAL MEDICINE

## 2022-01-21 PROCEDURE — 3051F PR MOST RECENT HEMOGLOBIN A1C LEVEL 7.0 - < 8.0%: ICD-10-PCS | Mod: CPTII,S$GLB,, | Performed by: INTERNAL MEDICINE

## 2022-01-21 PROCEDURE — 3008F PR BODY MASS INDEX (BMI) DOCUMENTED: ICD-10-PCS | Mod: CPTII,S$GLB,, | Performed by: INTERNAL MEDICINE

## 2022-01-21 PROCEDURE — 3288F PR FALLS RISK ASSESSMENT DOCUMENTED: ICD-10-PCS | Mod: CPTII,S$GLB,, | Performed by: INTERNAL MEDICINE

## 2022-01-21 PROCEDURE — 1159F PR MEDICATION LIST DOCUMENTED IN MEDICAL RECORD: ICD-10-PCS | Mod: CPTII,S$GLB,, | Performed by: INTERNAL MEDICINE

## 2022-01-21 PROCEDURE — 99999 PR PBB SHADOW E&M-EST. PATIENT-LVL IV: CPT | Mod: PBBFAC,,, | Performed by: INTERNAL MEDICINE

## 2022-01-21 PROCEDURE — 3078F PR MOST RECENT DIASTOLIC BLOOD PRESSURE < 80 MM HG: ICD-10-PCS | Mod: CPTII,S$GLB,, | Performed by: INTERNAL MEDICINE

## 2022-01-21 PROCEDURE — 99214 OFFICE O/P EST MOD 30 MIN: CPT | Mod: S$GLB,,, | Performed by: INTERNAL MEDICINE

## 2022-01-21 PROCEDURE — 1159F MED LIST DOCD IN RCRD: CPT | Mod: CPTII,S$GLB,, | Performed by: INTERNAL MEDICINE

## 2022-01-21 PROCEDURE — 99214 PR OFFICE/OUTPT VISIT, EST, LEVL IV, 30-39 MIN: ICD-10-PCS | Mod: S$GLB,,, | Performed by: INTERNAL MEDICINE

## 2022-01-21 PROCEDURE — 1157F PR ADVANCE CARE PLAN OR EQUIV PRESENT IN MEDICAL RECORD: ICD-10-PCS | Mod: CPTII,S$GLB,, | Performed by: INTERNAL MEDICINE

## 2022-01-21 PROCEDURE — 1160F RVW MEDS BY RX/DR IN RCRD: CPT | Mod: CPTII,S$GLB,, | Performed by: INTERNAL MEDICINE

## 2022-01-21 PROCEDURE — 3078F DIAST BP <80 MM HG: CPT | Mod: CPTII,S$GLB,, | Performed by: INTERNAL MEDICINE

## 2022-01-21 PROCEDURE — 4010F PR ACE/ARB THEARPY RXD/TAKEN: ICD-10-PCS | Mod: CPTII,S$GLB,, | Performed by: INTERNAL MEDICINE

## 2022-01-21 PROCEDURE — 3288F FALL RISK ASSESSMENT DOCD: CPT | Mod: CPTII,S$GLB,, | Performed by: INTERNAL MEDICINE

## 2022-01-21 PROCEDURE — 1157F ADVNC CARE PLAN IN RCRD: CPT | Mod: CPTII,S$GLB,, | Performed by: INTERNAL MEDICINE

## 2022-01-21 PROCEDURE — 99999 PR PBB SHADOW E&M-EST. PATIENT-LVL IV: ICD-10-PCS | Mod: PBBFAC,,, | Performed by: INTERNAL MEDICINE

## 2022-01-21 PROCEDURE — 1101F PT FALLS ASSESS-DOCD LE1/YR: CPT | Mod: CPTII,S$GLB,, | Performed by: INTERNAL MEDICINE

## 2022-01-21 RX ORDER — OXYCODONE AND ACETAMINOPHEN 10; 325 MG/1; MG/1
1.5 TABLET ORAL EVERY 6 HOURS PRN
Qty: 180 TABLET | Refills: 0 | Status: SHIPPED | OUTPATIENT
Start: 2022-01-21 | End: 2022-01-24 | Stop reason: SDUPTHER

## 2022-01-21 NOTE — PROGRESS NOTES
Patient ID: William Manuel .     Chief Complaint:   Chief Complaint   Patient presents with    3 month follow up    Back Pain        HPI:  Follow-up for chronic back pain that we have been medicating with Percocet 10/325 mg every 6 hours as needed.  This is been a great benefit to the patient because he is a non operative candidate and does have significant spine pathology.  He cannot have his spine surgery or any surgery until early September of this year due to recent cardiac stenting and he reconfirmed with his cardiologist that he cannot get off the Plavix for least 1 year.  So in the meantime I will provide him with Percocet.  He does request a small increase I think this is due to tolerance to the medicine which is a known side effect.  I will increase his Percocet to 15 mg every 6 hours as needed for pain.  He was gracious enough to sign a pain contract with me today and does consent to urine drug test today.    His labs reviewed and his diabetes is still a bit higher than I like with an A1c of 7.9 but it has improved.     His cholesterol is well controlled.      His hemoglobin and hematocrit are slightly elevated due to the testosterone that he takes but I do not want to start therapeutic phlebotomy at this point.  I would like him to keep hydrated and we will check a CBC at a later date.      His testosterone is low end but he is taking smaller dose I think that is fair.    He does Complains of burning in his Left hand from his mid forearm to his fingers especially at night that awakens him. His previous neck surgery went well, so I think he has carpal tunnel syndrome and I want his to try a carpal tunnel glove.     Review of Systems   Constitutional: Negative.    HENT: Negative.    Eyes: Negative.    Respiratory: Negative.    Cardiovascular: Negative.    Gastrointestinal: Negative.    Endocrine: Negative.    Genitourinary: Negative.    Musculoskeletal: Positive for arthralgias.   Skin: Negative.     Allergic/Immunologic: Negative.    Neurological: Negative.    Hematological: Negative.    Psychiatric/Behavioral: Negative.           Objective:      Physical Exam   Physical Exam  Vitals and nursing note reviewed.   Constitutional:       Appearance: Normal appearance. He is well-developed. He is obese.   HENT:      Head: Normocephalic and atraumatic.      Nose: Nose normal.   Eyes:      Extraocular Movements: Extraocular movements intact.      Conjunctiva/sclera: Conjunctivae normal.      Pupils: Pupils are equal, round, and reactive to light.   Cardiovascular:      Rate and Rhythm: Normal rate and regular rhythm.      Pulses: Normal pulses.      Heart sounds: Normal heart sounds.   Pulmonary:      Effort: Pulmonary effort is normal.      Breath sounds: Normal breath sounds.   Abdominal:      General: Bowel sounds are normal.      Palpations: Abdomen is soft.   Musculoskeletal:         General: Normal range of motion.      Cervical back: Normal range of motion and neck supple.   Skin:     General: Skin is warm and dry.      Capillary Refill: Capillary refill takes less than 2 seconds.   Neurological:      General: No focal deficit present.      Mental Status: He is alert and oriented to person, place, and time.   Psychiatric:         Mood and Affect: Mood normal.         Behavior: Behavior normal.         Thought Content: Thought content normal.         Judgment: Judgment normal.     Protective Sensation (w/ 10 gram monofilament):  Right: Intact  Left: Intact    Visual Inspection:  Normal -  Bilateral    Pedal Pulses:   Right: Present  Left: Present    Posterior tibialis:   Right:Present  Left: Present           Vitals:   Vitals:    01/21/22 1357   BP: 104/72   Pulse: 73   SpO2: (!) 94%   Weight: 121.1 kg (267 lb)   Height: 6' (1.829 m)          Current Outpatient Medications:     amLODIPine-benazepriL (LOTREL) 10-40 mg per capsule, TAKE 1 CAPSULE DAILY, Disp: 90 capsule, Rfl: 3    aspirin 81 MG Chew, Take 325  mg by mouth once daily. , Disp: , Rfl:     blood sugar diagnostic Strp, To check BG 2 times daily, to use with insurance preferred meter, Disp: 200 strip, Rfl: 12    clonazePAM (KLONOPIN) 0.5 MG tablet, Take 1 tablet (0.5 mg total) by mouth 2 (two) times daily., Disp: 180 tablet, Rfl: 1    etodolac (LODINE) 400 MG tablet, TAKE 1 TABLET DAILY, Disp: 90 tablet, Rfl: 3    ezetimibe (ZETIA) 10 mg tablet, Take 10 mg by mouth once daily., Disp: , Rfl:     furosemide (LASIX) 40 MG tablet, TAKE 1 TABLET DAILY, Disp: 90 tablet, Rfl: 3    glipiZIDE (GLUCOTROL) 5 MG tablet, TAKE 1 TABLET TWICE A DAY BEFORE MEALS, Disp: 180 tablet, Rfl: 3    lancets Misc, To check BG 2 times daily, to use with insurance preferred meter, Disp: 200 each, Rfl: 12    metoprolol succinate (TOPROL-XL) 100 MG 24 hr tablet, TAKE 1 TABLET DAILY, Disp: 90 tablet, Rfl: 3    omeprazole (PRILOSEC) 40 MG capsule, TAKE 1 CAPSULE DAILY, Disp: 90 capsule, Rfl: 3    prasugreL (EFFIENT) 10 mg Tab, Take 10 mg by mouth once daily., Disp: , Rfl:     promethazine (PHENERGAN) 25 MG tablet, Take 1 tablet (25 mg total) by mouth every 6 (six) hours as needed for Nausea., Disp: 20 tablet, Rfl: 0    rivaroxaban (XARELTO ORAL), Xarelto, Disp: , Rfl:     rosuvastatin (CRESTOR) 20 MG tablet, Take 20 mg by mouth once daily., Disp: , Rfl:     tamsulosin (FLOMAX) 0.4 mg Cap, TAKE 1 CAPSULE DAILY, Disp: 90 capsule, Rfl: 3    testosterone cypionate (DEPOTESTOTERONE CYPIONATE) 200 mg/mL injection, Inject 0.5 mLs (100 mg total) into the muscle every 14 (fourteen) days., Disp: 5 mL, Rfl: 3    zolpidem (AMBIEN) 10 mg Tab, TAKE 1 TABLET(10 MG) BY MOUTH EVERY NIGHT AS NEEDED FOR INSOMNIA, Disp: 30 tablet, Rfl: 5    blood-glucose meter kit, To check BG 2 times daily, to use with insurance preferred meter, Disp: 1 each, Rfl: 0    oxyCODONE-acetaminophen (PERCOCET)  mg per tablet, Take 1.5 tablets by mouth every 6 (six) hours as needed for Pain., Disp: 180 tablet,  Rfl: 0   Assessment:       Patient Active Problem List    Diagnosis Date Noted    Thoracic aortic aneurysm, without rupture 01/21/2022    History of pulmonary embolism 10/21/2021    Cough 10/21/2021    Chronic midline low back pain with right-sided sciatica 10/21/2021    Lumbar degenerative disc disease 03/22/2021    Sciatic nerve pain, right 03/22/2021    Insomnia due to medical condition 07/30/2020    Fatigue 07/30/2020    Other headache syndrome 07/07/2020    Uncontrolled type 2 diabetes mellitus with circulatory disorder, without long-term current use of insulin 06/05/2020    Morbid (severe) obesity due to excess calories 07/18/2019    Opiate withdrawal 07/18/2019    Vasospasm 07/18/2019    Class 2 obesity due to excess calories with body mass index (BMI) of 36.0 to 36.9 in adult 07/18/2019    VONNIE on CPAP     Hypertension     GERD without esophagitis     BPH (benign prostatic hyperplasia)     Anxiety     Unstable angina 05/31/2019    Renal insufficiency 05/31/2019    Benign prostatic hyperplasia with incomplete bladder emptying 03/13/2018    Acute urinary retention 03/13/2018    Acute prostatitis 03/13/2018    Right lower quadrant abdominal pain 03/11/2018    Flank pain, acute 03/11/2018    Migraine headache     H/O heart bypass surgery     Diabetes mellitus     Coronary artery disease     Claustrophobia     Anticoagulant long-term use     Sepsis secondary to UTI 03/10/2018    Spondylosis without myelopathy or radiculopathy, cervical region 05/23/2016    Essential hypertension 05/11/2016    Type 2 diabetes mellitus 05/11/2016    Coronary artery disease involving native coronary artery of native heart without angina pectoris 05/11/2016    Polycythemia 05/11/2016    Acute lower GI bleeding 05/11/2016    Dyslipidemia 05/11/2016    Gastroesophageal reflux disease without esophagitis 05/11/2016    Hypogonadism in male 05/11/2016    Migraine without aura and without status  migrainosus, not intractable 05/11/2016    Primary osteoarthritis involving multiple joints 05/11/2016          Plan:       William Manuel Jonas  was seen today for follow-up and may need lab work.    Diagnoses and all orders for this visit:    William was seen today for 3 month follow up and back pain.    Diagnoses and all orders for this visit:    Essential hypertension  Controlled    Chronic midline low back pain with right-sided sciatica  -     oxyCODONE-acetaminophen (PERCOCET)  mg per tablet; Take 1.5 tablets by mouth every 6 (six) hours as needed for Pain.  Monitor  Pain Contract Completed    Type 2 diabetes mellitus with other circulatory complication, without long-term current use of insulin  Uncontrolled but improving     Morbid (severe) obesity due to excess calories  Monitor    Thoracic aortic aneurysm, without rupture  Monitor     Hypogonadism in male  Monitor testosterone and CBC     Coronary artery disease involving native coronary artery of native heart without angina pectoris  On plavix for 8 more months due to recent stenting

## 2022-01-24 DIAGNOSIS — G89.29 CHRONIC MIDLINE LOW BACK PAIN WITH RIGHT-SIDED SCIATICA: ICD-10-CM

## 2022-01-24 DIAGNOSIS — M54.41 CHRONIC MIDLINE LOW BACK PAIN WITH RIGHT-SIDED SCIATICA: ICD-10-CM

## 2022-01-25 RX ORDER — OXYCODONE AND ACETAMINOPHEN 10; 325 MG/1; MG/1
1.5 TABLET ORAL EVERY 6 HOURS PRN
Qty: 180 TABLET | Refills: 0 | Status: SHIPPED | OUTPATIENT
Start: 2022-01-25 | End: 2022-02-18 | Stop reason: SDUPTHER

## 2022-01-25 NOTE — TELEPHONE ENCOUNTER
----- Message from Ruby Pearson sent at 1/24/2022  8:36 AM CST -----  Type:  RX Refill Request  Who Called: Patient  Refill or New Rx: refill  RX Name and Strength: oxyCODONE-acetaminophen (PERCOCET)  mg per tablet 180 tablet   How is the patient currently taking it? (ex. 1XDay):    Is this a 30 day or 90 day RX:    Preferred Pharmacy with phone number:  [id number] - [place], [place] - [address] [facility] [address] [facility] OF [address] [place] ([place])   Phone:  [phone]  Fax:  [phone]  Local or Mail Order:  Local  Ordering Provider:  Zach Almaraz MD  Best Call Back Number:  [phone]  Additional Information: Pt requesting refill on Rx oxyCODONE-acetaminophen (PERCOCET)  mg per tablet 180 tablet

## 2022-01-25 NOTE — TELEPHONE ENCOUNTER
No new care gaps identified.  Powered by [employer]. Reference number: [id number].   1/24/2022 9:33:04 PM CST

## 2022-01-29 LAB
6MAM UR QL: NOT DETECTED
7AMINOCLONAZEPAM UR QL: PRESENT
A-OH ALPRAZ UR QL: NOT DETECTED
ALPHA-OH-MIDAZOLAM: NOT DETECTED
ALPRAZ UR QL: NOT DETECTED
AMPHET UR QL SCN: NOT DETECTED
ANNOTATION COMMENT IMP: NORMAL
ANNOTATION COMMENT IMP: NORMAL
BARBITURATES UR QL: NOT DETECTED
BUPRENORPHINE UR QL: NOT DETECTED
BZE UR QL: NOT DETECTED
CARBOXYTHC UR QL: NOT DETECTED
CARISOPRODOL UR QL: NOT DETECTED
CLONAZEPAM UR QL: NOT DETECTED
CODEINE UR QL: NOT DETECTED
CREAT UR-MCNC: 59.9 MG/DL (ref 20–400)
DIAZEPAM UR QL: NOT DETECTED
ETHYL GLUCURONIDE UR QL: NOT DETECTED
FENTANYL UR QL: NOT DETECTED
GABAPENTIN: NOT DETECTED
HYDROCODONE UR QL: NOT DETECTED
HYDROMORPHONE UR QL: NOT DETECTED
LORAZEPAM UR QL: NOT DETECTED
MDA UR QL: NOT DETECTED
MDEA UR QL: NOT DETECTED
MDMA UR QL: NOT DETECTED
ME-PHENIDATE UR QL: NOT DETECTED
METHADONE UR QL: NOT DETECTED
METHAMPHET UR QL: NOT DETECTED
MIDAZOLAM UR QL SCN: NOT DETECTED
MORPHINE UR QL: NOT DETECTED
NALOXONE: NOT DETECTED
NORBUPRENORPHINE UR QL CFM: NOT DETECTED
NORDIAZEPAM UR QL: NOT DETECTED
NORFENTANYL UR QL: NOT DETECTED
NORHYDROCODONE UR QL CFM: NOT DETECTED
NORMEPERIDINE UR QL CFM: PRESENT
NOROXYCODONE UR QL CFM: PRESENT
NOROXYMORPHONE UR QL SCN: PRESENT
OXAZEPAM UR QL: NOT DETECTED
OXYCODONE UR QL: PRESENT
OXYMORPHONE UR QL: PRESENT
PATHOLOGY STUDY: NORMAL
PCP UR QL: NOT DETECTED
PHENTERMINE UR QL: NOT DETECTED
PREGABALIN: NOT DETECTED
SERVICE CMNT-IMP: NORMAL
TAPENTADOL UR QL SCN: NOT DETECTED
TAPENTADOL UR QL SCN: NOT DETECTED
TEMAZEPAM UR QL: NOT DETECTED
TRAMADOL UR QL: NOT DETECTED
ZOLPIDEM METABOLITE: PRESENT
ZOLPIDEM UR QL: PRESENT

## 2022-03-22 DIAGNOSIS — M54.41 CHRONIC MIDLINE LOW BACK PAIN WITH RIGHT-SIDED SCIATICA: ICD-10-CM

## 2022-03-22 DIAGNOSIS — G89.29 CHRONIC MIDLINE LOW BACK PAIN WITH RIGHT-SIDED SCIATICA: ICD-10-CM

## 2022-03-22 RX ORDER — OXYCODONE AND ACETAMINOPHEN 10; 325 MG/1; MG/1
1.5 TABLET ORAL EVERY 6 HOURS PRN
Qty: 180 TABLET | Refills: 0 | Status: SHIPPED | OUTPATIENT
Start: 2022-03-22 | End: 2022-04-19 | Stop reason: SDUPTHER

## 2022-03-22 NOTE — TELEPHONE ENCOUNTER
No new care gaps identified.  Powered by [employer] by [employer]. Reference number: [id number].   3/22/2022 9:31:35 AM CDT

## 2022-03-22 NOTE — TELEPHONE ENCOUNTER
----- Message from Le Aviles, Patient Care Assistant sent at 3/22/2022  9:01 AM CDT -----  Regarding: advice  Contact: pt  Type: Needs Medical Advice  Who Called:  pt   Best Call Back Number: [phone] (home)     Additional Information: pt states he would like a callback regarding an pain medication. Thanks!

## 2022-04-19 DIAGNOSIS — M54.41 CHRONIC MIDLINE LOW BACK PAIN WITH RIGHT-SIDED SCIATICA: ICD-10-CM

## 2022-04-19 DIAGNOSIS — G89.29 CHRONIC MIDLINE LOW BACK PAIN WITH RIGHT-SIDED SCIATICA: ICD-10-CM

## 2022-04-19 NOTE — TELEPHONE ENCOUNTER
Care Due:                  Date            Visit Type   Department     Provider  --------------------------------------------------------------------------------                                EP -                              [facility]  Last Visit: 01-      CARE ([facility])   PRESTON Almaraz                               -                              [facility]  Next Visit: 05-      CARE ([facility])   MEDICINE       Zach Almaraz                                                            Last  Test          Frequency    Reason                     Performed    Due Date  --------------------------------------------------------------------------------    HBA1C.......  6 months...  glipiZIDE................  01- 07-    Powered by [employer]. Reference number: [id number].   4/19/2022 11:36:55 AM CDT

## 2022-04-19 NOTE — TELEPHONE ENCOUNTER
----- Message from Camelia Garcia sent at 4/19/2022 10:40 AM CDT -----  Regarding: refill  Can the clinic reply in [facility]: NO      Please refill the medication(s) listed below. Please call the patient when the prescription(s) is ready for  at this phone number   [phone] ERIC SALINAS JR. [[id number]]      Medication #1 oxyCODONE-acetaminophen 10 mg           Preferred Pharmacy: [id number] - [place] - [address] [facility] AT [address] & [place] ([place])

## 2022-04-20 RX ORDER — OXYCODONE AND ACETAMINOPHEN 10; 325 MG/1; MG/1
1.5 TABLET ORAL EVERY 6 HOURS PRN
Qty: 180 TABLET | Refills: 0 | Status: SHIPPED | OUTPATIENT
Start: 2022-04-20 | End: 2022-05-12 | Stop reason: SDUPTHER

## 2022-04-27 DIAGNOSIS — F41.9 ANXIETY: ICD-10-CM

## 2022-04-27 RX ORDER — CLONAZEPAM 0.5 MG/1
0.5 TABLET ORAL 2 TIMES DAILY
Qty: 180 TABLET | Refills: 0 | Status: SHIPPED | OUTPATIENT
Start: 2022-04-27 | End: 2022-09-14

## 2022-04-27 NOTE — TELEPHONE ENCOUNTER
No new care gaps identified.  Powered by [employer]. Reference number: [id number].   4/27/2022 4:24:13 PM CDT

## 2022-05-09 ENCOUNTER — PATIENT MESSAGE (OUTPATIENT)
Dept: SMOKING CESSATION | Facility: CLINIC | Age: 72
End: 2022-05-09
Payer: MEDICARE

## 2022-05-12 DIAGNOSIS — G89.29 CHRONIC MIDLINE LOW BACK PAIN WITH RIGHT-SIDED SCIATICA: ICD-10-CM

## 2022-05-12 DIAGNOSIS — M54.41 CHRONIC MIDLINE LOW BACK PAIN WITH RIGHT-SIDED SCIATICA: ICD-10-CM

## 2022-05-12 RX ORDER — OXYCODONE AND ACETAMINOPHEN 10; 325 MG/1; MG/1
1.5 TABLET ORAL EVERY 6 HOURS PRN
Qty: 180 TABLET | Refills: 0 | Status: SHIPPED | OUTPATIENT
Start: 2022-05-12 | End: 2022-06-08 | Stop reason: SDUPTHER

## 2022-05-12 NOTE — TELEPHONE ENCOUNTER
----- Message from Tonja Multani sent at 5/12/2022  8:20 AM CDT -----  Contact: Self  Type:  RX Refill Request    Who Called:  Patient  Refill or New Rx:  New Rx  RX Name and Strength:  oxyCODONE-acetaminophen (PERCOCET)  mg per tablet  How is the patient currently taking it? (ex. 1XDay):  As Directed  Is this a 30 day or 90 day RX:  30  Preferred Pharmacy with phone number:    [facility] #[id number] - [place], [place] - [address] AT [facility] OF [address] [place] ([place])  [address] [place] [address]  Phone: [phone] Fax: [phone]  Local or Mail Order:  Local  Ordering Provider:  Dr. Sung Juarez Call Back Number:  [phone] (home)   Additional Information:  Thank You

## 2022-05-12 NOTE — TELEPHONE ENCOUNTER
No new care gaps identified.  [facility] Embedded Care Gaps. Reference number: [id number]. 5/12/2022   9:09:40 AM CDT

## 2022-05-16 ENCOUNTER — LAB VISIT (OUTPATIENT)
Dept: LAB | Facility: HOSPITAL | Age: 72
End: 2022-05-16
Payer: MEDICARE

## 2022-05-16 DIAGNOSIS — E11.59 TYPE 2 DIABETES MELLITUS WITH OTHER CIRCULATORY COMPLICATION, WITHOUT LONG-TERM CURRENT USE OF INSULIN: ICD-10-CM

## 2022-05-16 DIAGNOSIS — E29.1 HYPOGONADISM IN MALE: ICD-10-CM

## 2022-05-16 LAB
BASOPHILS # BLD AUTO: 0.08 K/UL (ref 0–0.2)
BASOPHILS NFR BLD: 1 % (ref 0–1.9)
DIFFERENTIAL METHOD: NORMAL
EOSINOPHIL # BLD AUTO: 0.5 K/UL (ref 0–0.5)
EOSINOPHIL NFR BLD: 6.1 % (ref 0–8)
ERYTHROCYTE [DISTWIDTH] IN BLOOD BY AUTOMATED COUNT: 13.3 % (ref 11.5–14.5)
ESTIMATED AVG GLUCOSE: 169 MG/DL (ref 68–131)
HBA1C MFR BLD: 7.5 % (ref 4–5.6)
HCT VFR BLD AUTO: 48.7 % (ref 40–54)
HGB BLD-MCNC: 16.3 G/DL (ref 14–18)
IMM GRANULOCYTES # BLD AUTO: 0.02 K/UL (ref 0–0.04)
IMM GRANULOCYTES NFR BLD AUTO: 0.3 % (ref 0–0.5)
LYMPHOCYTES # BLD AUTO: 2 K/UL (ref 1–4.8)
LYMPHOCYTES NFR BLD: 26.6 % (ref 18–48)
MCH RBC QN AUTO: 30.8 PG (ref 27–31)
MCHC RBC AUTO-ENTMCNC: 33.5 G/DL (ref 32–36)
MCV RBC AUTO: 92 FL (ref 82–98)
MONOCYTES # BLD AUTO: 0.8 K/UL (ref 0.3–1)
MONOCYTES NFR BLD: 10.4 % (ref 4–15)
NEUTROPHILS # BLD AUTO: 4.3 K/UL (ref 1.8–7.7)
NEUTROPHILS NFR BLD: 55.6 % (ref 38–73)
NRBC BLD-RTO: 0 /100 WBC
PLATELET # BLD AUTO: 195 K/UL (ref 150–450)
PMV BLD AUTO: 11.1 FL (ref 9.2–12.9)
RBC # BLD AUTO: 5.29 M/UL (ref 4.6–6.2)
WBC # BLD AUTO: 7.66 K/UL (ref 3.9–12.7)

## 2022-05-16 PROCEDURE — 85025 COMPLETE CBC W/AUTO DIFF WBC: CPT | Performed by: INTERNAL MEDICINE

## 2022-05-16 PROCEDURE — 36415 COLL VENOUS BLD VENIPUNCTURE: CPT | Mod: PO | Performed by: INTERNAL MEDICINE

## 2022-05-16 PROCEDURE — 83036 HEMOGLOBIN GLYCOSYLATED A1C: CPT | Performed by: INTERNAL MEDICINE

## 2022-05-23 ENCOUNTER — OFFICE VISIT (OUTPATIENT)
Dept: FAMILY MEDICINE | Facility: CLINIC | Age: 72
End: 2022-05-23
Payer: MEDICARE

## 2022-05-23 VITALS
HEART RATE: 75 BPM | DIASTOLIC BLOOD PRESSURE: 80 MMHG | OXYGEN SATURATION: 95 % | SYSTOLIC BLOOD PRESSURE: 134 MMHG | HEIGHT: 72 IN | BODY MASS INDEX: 34.13 KG/M2 | WEIGHT: 252 LBS

## 2022-05-23 DIAGNOSIS — I10 ESSENTIAL HYPERTENSION: ICD-10-CM

## 2022-05-23 DIAGNOSIS — E66.01 MORBID (SEVERE) OBESITY DUE TO EXCESS CALORIES: ICD-10-CM

## 2022-05-23 DIAGNOSIS — D75.1 POLYCYTHEMIA: ICD-10-CM

## 2022-05-23 DIAGNOSIS — M51.36 LUMBAR DEGENERATIVE DISC DISEASE: Primary | ICD-10-CM

## 2022-05-23 DIAGNOSIS — I25.10 CORONARY ARTERY DISEASE INVOLVING NATIVE CORONARY ARTERY OF NATIVE HEART WITHOUT ANGINA PECTORIS: ICD-10-CM

## 2022-05-23 DIAGNOSIS — E66.01 CLASS 2 SEVERE OBESITY DUE TO EXCESS CALORIES WITH SERIOUS COMORBIDITY AND BODY MASS INDEX (BMI) OF 36.0 TO 36.9 IN ADULT: ICD-10-CM

## 2022-05-23 PROBLEM — A41.9 SEPSIS SECONDARY TO UTI: Status: RESOLVED | Noted: 2018-03-10 | Resolved: 2022-05-23

## 2022-05-23 PROBLEM — N41.0 ACUTE PROSTATITIS: Status: RESOLVED | Noted: 2018-03-13 | Resolved: 2022-05-23

## 2022-05-23 PROBLEM — N39.0 SEPSIS SECONDARY TO UTI: Status: RESOLVED | Noted: 2018-03-10 | Resolved: 2022-05-23

## 2022-05-23 PROCEDURE — 1160F RVW MEDS BY RX/DR IN RCRD: CPT | Mod: CPTII,S$GLB,, | Performed by: INTERNAL MEDICINE

## 2022-05-23 PROCEDURE — 3061F PR NEG MICROALBUMINURIA RESULT DOCUMENTED/REVIEW: ICD-10-PCS | Mod: CPTII,S$GLB,, | Performed by: INTERNAL MEDICINE

## 2022-05-23 PROCEDURE — 3288F PR FALLS RISK ASSESSMENT DOCUMENTED: ICD-10-PCS | Mod: CPTII,S$GLB,, | Performed by: INTERNAL MEDICINE

## 2022-05-23 PROCEDURE — 1125F PR PAIN SEVERITY QUANTIFIED, PAIN PRESENT: ICD-10-PCS | Mod: CPTII,S$GLB,, | Performed by: INTERNAL MEDICINE

## 2022-05-23 PROCEDURE — 99214 OFFICE O/P EST MOD 30 MIN: CPT | Mod: S$GLB,,, | Performed by: INTERNAL MEDICINE

## 2022-05-23 PROCEDURE — 3075F PR MOST RECENT SYSTOLIC BLOOD PRESS GE 130-139MM HG: ICD-10-PCS | Mod: CPTII,S$GLB,, | Performed by: INTERNAL MEDICINE

## 2022-05-23 PROCEDURE — 3051F PR MOST RECENT HEMOGLOBIN A1C LEVEL 7.0 - < 8.0%: ICD-10-PCS | Mod: CPTII,S$GLB,, | Performed by: INTERNAL MEDICINE

## 2022-05-23 PROCEDURE — 99999 PR PBB SHADOW E&M-EST. PATIENT-LVL IV: CPT | Mod: PBBFAC,,, | Performed by: INTERNAL MEDICINE

## 2022-05-23 PROCEDURE — 99999 PR PBB SHADOW E&M-EST. PATIENT-LVL IV: ICD-10-PCS | Mod: PBBFAC,,, | Performed by: INTERNAL MEDICINE

## 2022-05-23 PROCEDURE — 1157F ADVNC CARE PLAN IN RCRD: CPT | Mod: CPTII,S$GLB,, | Performed by: INTERNAL MEDICINE

## 2022-05-23 PROCEDURE — 1159F PR MEDICATION LIST DOCUMENTED IN MEDICAL RECORD: ICD-10-PCS | Mod: CPTII,S$GLB,, | Performed by: INTERNAL MEDICINE

## 2022-05-23 PROCEDURE — 1101F PT FALLS ASSESS-DOCD LE1/YR: CPT | Mod: CPTII,S$GLB,, | Performed by: INTERNAL MEDICINE

## 2022-05-23 PROCEDURE — 3008F PR BODY MASS INDEX (BMI) DOCUMENTED: ICD-10-PCS | Mod: CPTII,S$GLB,, | Performed by: INTERNAL MEDICINE

## 2022-05-23 PROCEDURE — 1157F PR ADVANCE CARE PLAN OR EQUIV PRESENT IN MEDICAL RECORD: ICD-10-PCS | Mod: CPTII,S$GLB,, | Performed by: INTERNAL MEDICINE

## 2022-05-23 PROCEDURE — 3051F HG A1C>EQUAL 7.0%<8.0%: CPT | Mod: CPTII,S$GLB,, | Performed by: INTERNAL MEDICINE

## 2022-05-23 PROCEDURE — 4010F PR ACE/ARB THEARPY RXD/TAKEN: ICD-10-PCS | Mod: CPTII,S$GLB,, | Performed by: INTERNAL MEDICINE

## 2022-05-23 PROCEDURE — 1101F PR PT FALLS ASSESS DOC 0-1 FALLS W/OUT INJ PAST YR: ICD-10-PCS | Mod: CPTII,S$GLB,, | Performed by: INTERNAL MEDICINE

## 2022-05-23 PROCEDURE — 3079F DIAST BP 80-89 MM HG: CPT | Mod: CPTII,S$GLB,, | Performed by: INTERNAL MEDICINE

## 2022-05-23 PROCEDURE — 3075F SYST BP GE 130 - 139MM HG: CPT | Mod: CPTII,S$GLB,, | Performed by: INTERNAL MEDICINE

## 2022-05-23 PROCEDURE — 3066F PR DOCUMENTATION OF TREATMENT FOR NEPHROPATHY: ICD-10-PCS | Mod: CPTII,S$GLB,, | Performed by: INTERNAL MEDICINE

## 2022-05-23 PROCEDURE — 1160F PR REVIEW ALL MEDS BY PRESCRIBER/CLIN PHARMACIST DOCUMENTED: ICD-10-PCS | Mod: CPTII,S$GLB,, | Performed by: INTERNAL MEDICINE

## 2022-05-23 PROCEDURE — 3288F FALL RISK ASSESSMENT DOCD: CPT | Mod: CPTII,S$GLB,, | Performed by: INTERNAL MEDICINE

## 2022-05-23 PROCEDURE — 3061F NEG MICROALBUMINURIA REV: CPT | Mod: CPTII,S$GLB,, | Performed by: INTERNAL MEDICINE

## 2022-05-23 PROCEDURE — 99214 PR OFFICE/OUTPT VISIT, EST, LEVL IV, 30-39 MIN: ICD-10-PCS | Mod: S$GLB,,, | Performed by: INTERNAL MEDICINE

## 2022-05-23 PROCEDURE — 1159F MED LIST DOCD IN RCRD: CPT | Mod: CPTII,S$GLB,, | Performed by: INTERNAL MEDICINE

## 2022-05-23 PROCEDURE — 3066F NEPHROPATHY DOC TX: CPT | Mod: CPTII,S$GLB,, | Performed by: INTERNAL MEDICINE

## 2022-05-23 PROCEDURE — 3008F BODY MASS INDEX DOCD: CPT | Mod: CPTII,S$GLB,, | Performed by: INTERNAL MEDICINE

## 2022-05-23 PROCEDURE — 4010F ACE/ARB THERAPY RXD/TAKEN: CPT | Mod: CPTII,S$GLB,, | Performed by: INTERNAL MEDICINE

## 2022-05-23 PROCEDURE — 3079F PR MOST RECENT DIASTOLIC BLOOD PRESSURE 80-89 MM HG: ICD-10-PCS | Mod: CPTII,S$GLB,, | Performed by: INTERNAL MEDICINE

## 2022-05-23 PROCEDURE — 1125F AMNT PAIN NOTED PAIN PRSNT: CPT | Mod: CPTII,S$GLB,, | Performed by: INTERNAL MEDICINE

## 2022-05-23 NOTE — PROGRESS NOTES
Patient ID: William Manuel .     Chief Complaint:   Chief Complaint   Patient presents with    wellness exam    Back Pain        HPI:  The follow-up for chronic back pain that we are medicating as best we can with Percocet 15 mg every 6 hours.  We are holding on until September which will be 1 year after his coronary stents so that hopefully he can stop some of his blood thinners and get a spinal surgery that he desperately needs.  I did refill his Percocet recently.  He still has the pain in his forearms going down to the tips of his fingers on both hands but mainly on her left this week.  Still think it could be something to do with carpal tunnel or possibly some that pathology but he has a history of neck surgery.  I am going to have to leave that up to his spine surgeon.  I offered gabapentin or Lyrica, but he has tried both and they are not effective.  I really do feel that for him so I will continue to supply him with Percocet.  We discussed routine immunizations and he respectfully declines a 4th COVID vaccine.  Unfortunately he cannot afford a shingles shot.  Thankfully, he did get the old shingles shot.    Review of Systems   Constitutional: Negative.    HENT: Negative.    Eyes: Negative.    Respiratory: Negative.    Cardiovascular: Negative.    Gastrointestinal: Negative.    Endocrine: Negative.    Genitourinary: Negative.    Musculoskeletal: Positive for back pain.   Skin: Negative.    Allergic/Immunologic: Negative.    Neurological: Negative.    Hematological: Negative.    Psychiatric/Behavioral: Negative.           Objective:      Physical Exam   Physical Exam  Vitals and nursing note reviewed.   Constitutional:       Appearance: Normal appearance. He is well-developed. He is obese.   HENT:      Head: Normocephalic and atraumatic.      Nose: Nose normal.   Eyes:      Extraocular Movements: Extraocular movements intact.      Conjunctiva/sclera: Conjunctivae normal.      Pupils: Pupils are  equal, round, and reactive to light.   Cardiovascular:      Rate and Rhythm: Normal rate and regular rhythm.      Pulses: Normal pulses.      Heart sounds: Normal heart sounds.   Pulmonary:      Effort: Pulmonary effort is normal.      Breath sounds: Normal breath sounds.   Abdominal:      General: Bowel sounds are normal.      Palpations: Abdomen is soft.   Musculoskeletal:      Cervical back: Normal range of motion and neck supple.      Comments: Decreased Range of Motion in back    Skin:     General: Skin is warm and dry.      Capillary Refill: Capillary refill takes less than 2 seconds.   Neurological:      General: No focal deficit present.      Mental Status: He is alert and oriented to person, place, and time.   Psychiatric:         Mood and Affect: Mood normal.         Behavior: Behavior normal.         Thought Content: Thought content normal.         Judgment: Judgment normal.            Vitals:   Vitals:    05/23/22 1413   BP: 134/80   Pulse: 75   SpO2: 95%   Weight: 114.3 kg (251 lb 15.8 oz)   Height: 6' (1.829 m)          Current Outpatient Medications:     amLODIPine-benazepriL (LOTREL) 10-40 mg per capsule, TAKE 1 CAPSULE DAILY, Disp: 90 capsule, Rfl: 3    aspirin 81 MG Chew, Take 325 mg by mouth once daily. , Disp: , Rfl:     blood sugar diagnostic Strp, To check BG 2 times daily, to use with insurance preferred meter, Disp: 200 strip, Rfl: 12    clonazePAM (KLONOPIN) 0.5 MG tablet, Take 1 tablet (0.5 mg total) by mouth 2 (two) times daily., Disp: 180 tablet, Rfl: 0    etodolac (LODINE) 400 MG tablet, TAKE 1 TABLET DAILY, Disp: 90 tablet, Rfl: 3    ezetimibe (ZETIA) 10 mg tablet, Take 10 mg by mouth once daily., Disp: , Rfl:     furosemide (LASIX) 40 MG tablet, TAKE 1 TABLET DAILY, Disp: 90 tablet, Rfl: 3    glipiZIDE (GLUCOTROL) 5 MG tablet, TAKE 1 TABLET TWICE A DAY BEFORE MEALS, Disp: 180 tablet, Rfl: 3    lancets Misc, To check BG 2 times daily, to use with insurance preferred meter,  Disp: 200 each, Rfl: 12    metoprolol succinate (TOPROL-XL) 100 MG 24 hr tablet, TAKE 1 TABLET DAILY, Disp: 90 tablet, Rfl: 3    omeprazole (PRILOSEC) 40 MG capsule, TAKE 1 CAPSULE DAILY, Disp: 90 capsule, Rfl: 3    oxyCODONE-acetaminophen (PERCOCET)  mg per tablet, Take 1.5 tablets by mouth every 6 (six) hours as needed for Pain., Disp: 180 tablet, Rfl: 0    prasugreL (EFFIENT) 10 mg Tab, Take 10 mg by mouth once daily., Disp: , Rfl:     promethazine (PHENERGAN) 25 MG tablet, Take 1 tablet (25 mg total) by mouth every 6 (six) hours as needed for Nausea., Disp: 20 tablet, Rfl: 0    rivaroxaban (XARELTO ORAL), Xarelto, Disp: , Rfl:     rosuvastatin (CRESTOR) 20 MG tablet, Take 20 mg by mouth once daily., Disp: , Rfl:     tamsulosin (FLOMAX) 0.4 mg Cap, TAKE 1 CAPSULE DAILY, Disp: 90 capsule, Rfl: 3    zolpidem (AMBIEN) 10 mg Tab, TAKE 1 TABLET(10 MG) BY MOUTH EVERY NIGHT AS NEEDED FOR INSOMNIA, Disp: 30 tablet, Rfl: 5    blood-glucose meter kit, To check BG 2 times daily, to use with insurance preferred meter, Disp: 1 each, Rfl: 0    testosterone cypionate (DEPOTESTOTERONE CYPIONATE) 200 mg/mL injection, Inject 0.5 mLs (100 mg total) into the muscle every 14 (fourteen) days., Disp: 5 mL, Rfl: 3   Assessment:       Patient Active Problem List    Diagnosis Date Noted    Thoracic aortic aneurysm, without rupture 01/21/2022    History of pulmonary embolism 10/21/2021    Cough 10/21/2021    Chronic midline low back pain with right-sided sciatica 10/21/2021    Lumbar degenerative disc disease 03/22/2021    Sciatic nerve pain, right 03/22/2021    Insomnia due to medical condition 07/30/2020    Fatigue 07/30/2020    Other headache syndrome 07/07/2020    Morbid (severe) obesity due to excess calories 07/18/2019    Opiate withdrawal 07/18/2019    Vasospasm 07/18/2019    Class 2 obesity due to excess calories with body mass index (BMI) of 36.0 to 36.9 in adult 07/18/2019    VONNIE on CPAP     GERD  without esophagitis     BPH (benign prostatic hyperplasia)     Anxiety     Unstable angina 05/31/2019    Renal insufficiency 05/31/2019    Benign prostatic hyperplasia with incomplete bladder emptying 03/13/2018    Acute urinary retention 03/13/2018    Right lower quadrant abdominal pain 03/11/2018    Flank pain, acute 03/11/2018    Migraine headache     H/O heart bypass surgery     Claustrophobia     Anticoagulant long-term use     Spondylosis without myelopathy or radiculopathy, cervical region 05/23/2016    Essential hypertension 05/11/2016    Coronary artery disease involving native coronary artery of native heart without angina pectoris 05/11/2016    Polycythemia 05/11/2016    Acute lower GI bleeding 05/11/2016    Dyslipidemia 05/11/2016    Gastroesophageal reflux disease without esophagitis 05/11/2016    Hypogonadism in male 05/11/2016    Migraine without aura and without status migrainosus, not intractable 05/11/2016    Primary osteoarthritis involving multiple joints 05/11/2016          Plan:       William MCCONNELL Mar Rizvi  was seen today for follow-up and may need lab work.    Diagnoses and all orders for this visit:    William was seen today for wellness exam and back pain.    Diagnoses and all orders for this visit:    Lumbar degenerative disc disease  Monitor on Percocet     Essential hypertension  Controlled    Coronary artery disease involving native coronary artery of native heart without angina pectoris  Per Dr. Sahni     Polycythemia  Resolved     Morbid (severe) obesity due to excess calories  Monitor     Class 2 severe obesity due to excess calories with serious comorbidity and body mass index (BMI) of 36.0 to 36.9 in adult  Monitor - could be the reason for shortness of breath

## 2022-05-26 ENCOUNTER — PATIENT OUTREACH (OUTPATIENT)
Dept: ADMINISTRATIVE | Facility: HOSPITAL | Age: 72
End: 2022-05-26
Payer: MEDICARE

## 2022-08-03 ENCOUNTER — PATIENT MESSAGE (OUTPATIENT)
Dept: FAMILY MEDICINE | Facility: CLINIC | Age: 72
End: 2022-08-03
Payer: MEDICARE

## 2022-08-03 DIAGNOSIS — G89.29 CHRONIC MIDLINE LOW BACK PAIN WITH RIGHT-SIDED SCIATICA: Primary | ICD-10-CM

## 2022-08-03 DIAGNOSIS — M54.41 CHRONIC MIDLINE LOW BACK PAIN WITH RIGHT-SIDED SCIATICA: Primary | ICD-10-CM

## 2022-08-04 ENCOUNTER — PATIENT MESSAGE (OUTPATIENT)
Dept: FAMILY MEDICINE | Facility: CLINIC | Age: 72
End: 2022-08-04
Payer: MEDICARE

## 2022-08-08 ENCOUNTER — PATIENT MESSAGE (OUTPATIENT)
Dept: FAMILY MEDICINE | Facility: CLINIC | Age: 72
End: 2022-08-08
Payer: MEDICARE

## 2022-08-15 RX ORDER — GLIPIZIDE 5 MG/1
TABLET ORAL
Qty: 180 TABLET | Refills: 1 | Status: SHIPPED | OUTPATIENT
Start: 2022-08-15 | End: 2023-02-13

## 2022-08-15 NOTE — TELEPHONE ENCOUNTER
Care Due:                  Date            Visit Type   Department     Provider  --------------------------------------------------------------------------------                                EP -                              [facility]  Last Visit: 05-      CARE ([facility])   PRESTON Almaraz                               -                              [facility]  Next Visit: 09-      CARE ([facility])   MEDICINE       Zach Almaraz                                                            Last  Test          Frequency    Reason                     Performed    Due Date  --------------------------------------------------------------------------------    HBA1C.......  6 months...  glipiZIDE................  05- 11-    [facility] Embedded Care Gaps. Reference number: [id number]. 8/14/2022   11:29:17 PM CDT

## 2022-08-15 NOTE — TELEPHONE ENCOUNTER
Refill Authorization Note   William Manuel  is requesting a refill authorization.  Brief Assessment and Rationale for Refill:  Approve     Medication Therapy Plan:       Medication Reconciliation Completed: No   Comments:     No Care Gaps recommended.     Note composed:5:11 PM 08/15/2022

## 2022-09-08 ENCOUNTER — TELEPHONE (OUTPATIENT)
Dept: PAIN MEDICINE | Facility: CLINIC | Age: 72
End: 2022-09-08
Payer: MEDICARE

## 2022-09-08 NOTE — TELEPHONE ENCOUNTER
Pt rec'd a call to arrive early to complete NP paperwork. Advised pt that he can check in at visit time and have enough time to complete. He verbalized understanding.

## 2022-09-08 NOTE — TELEPHONE ENCOUNTER
----- Message from Jinny Colby sent at 9/7/2022  1:12 PM CDT -----  Contact: Wife  Type:  Needs Medical Advice    Who Called:  Wife     Would the patient rather a call back or a response via [facility]? Call     Best Call Back Number: [phone] (home)      Additional Information: Wife called and patient has an appointment Friday and she wants to know if she can get the paperwork faxed that he will need filled out when he comes into the clinic. The fax number to wife work is [phone].     Please call to advice

## 2022-09-09 ENCOUNTER — OFFICE VISIT (OUTPATIENT)
Dept: PAIN MEDICINE | Facility: CLINIC | Age: 72
End: 2022-09-09
Payer: MEDICARE

## 2022-09-09 VITALS
DIASTOLIC BLOOD PRESSURE: 85 MMHG | BODY MASS INDEX: 33.56 KG/M2 | SYSTOLIC BLOOD PRESSURE: 129 MMHG | HEART RATE: 92 BPM | OXYGEN SATURATION: 95 % | WEIGHT: 247.81 LBS | HEIGHT: 72 IN

## 2022-09-09 DIAGNOSIS — M47.816 LUMBAR SPONDYLOSIS: ICD-10-CM

## 2022-09-09 DIAGNOSIS — M48.061 SPINAL STENOSIS OF LUMBAR REGION WITHOUT NEUROGENIC CLAUDICATION: ICD-10-CM

## 2022-09-09 DIAGNOSIS — M54.16 LUMBAR RADICULOPATHY: Primary | ICD-10-CM

## 2022-09-09 DIAGNOSIS — M54.41 CHRONIC MIDLINE LOW BACK PAIN WITH RIGHT-SIDED SCIATICA: ICD-10-CM

## 2022-09-09 DIAGNOSIS — G89.29 CHRONIC MIDLINE LOW BACK PAIN WITH RIGHT-SIDED SCIATICA: ICD-10-CM

## 2022-09-09 PROCEDURE — 3061F NEG MICROALBUMINURIA REV: CPT | Mod: CPTII,S$GLB,, | Performed by: ANESTHESIOLOGY

## 2022-09-09 PROCEDURE — 1157F ADVNC CARE PLAN IN RCRD: CPT | Mod: CPTII,S$GLB,, | Performed by: ANESTHESIOLOGY

## 2022-09-09 PROCEDURE — 1159F PR MEDICATION LIST DOCUMENTED IN MEDICAL RECORD: ICD-10-PCS | Mod: CPTII,S$GLB,, | Performed by: ANESTHESIOLOGY

## 2022-09-09 PROCEDURE — 3079F PR MOST RECENT DIASTOLIC BLOOD PRESSURE 80-89 MM HG: ICD-10-PCS | Mod: CPTII,S$GLB,, | Performed by: ANESTHESIOLOGY

## 2022-09-09 PROCEDURE — 3066F NEPHROPATHY DOC TX: CPT | Mod: CPTII,S$GLB,, | Performed by: ANESTHESIOLOGY

## 2022-09-09 PROCEDURE — 3008F PR BODY MASS INDEX (BMI) DOCUMENTED: ICD-10-PCS | Mod: CPTII,S$GLB,, | Performed by: ANESTHESIOLOGY

## 2022-09-09 PROCEDURE — 3008F BODY MASS INDEX DOCD: CPT | Mod: CPTII,S$GLB,, | Performed by: ANESTHESIOLOGY

## 2022-09-09 PROCEDURE — 1159F MED LIST DOCD IN RCRD: CPT | Mod: CPTII,S$GLB,, | Performed by: ANESTHESIOLOGY

## 2022-09-09 PROCEDURE — 4010F ACE/ARB THERAPY RXD/TAKEN: CPT | Mod: CPTII,S$GLB,, | Performed by: ANESTHESIOLOGY

## 2022-09-09 PROCEDURE — 3074F SYST BP LT 130 MM HG: CPT | Mod: CPTII,S$GLB,, | Performed by: ANESTHESIOLOGY

## 2022-09-09 PROCEDURE — 1125F AMNT PAIN NOTED PAIN PRSNT: CPT | Mod: CPTII,S$GLB,, | Performed by: ANESTHESIOLOGY

## 2022-09-09 PROCEDURE — 99204 PR OFFICE/OUTPT VISIT, NEW, LEVL IV, 45-59 MIN: ICD-10-PCS | Mod: S$GLB,,, | Performed by: ANESTHESIOLOGY

## 2022-09-09 PROCEDURE — 99999 PR PBB SHADOW E&M-EST. PATIENT-LVL IV: CPT | Mod: PBBFAC,,, | Performed by: ANESTHESIOLOGY

## 2022-09-09 PROCEDURE — 4010F PR ACE/ARB THEARPY RXD/TAKEN: ICD-10-PCS | Mod: CPTII,S$GLB,, | Performed by: ANESTHESIOLOGY

## 2022-09-09 PROCEDURE — 3079F DIAST BP 80-89 MM HG: CPT | Mod: CPTII,S$GLB,, | Performed by: ANESTHESIOLOGY

## 2022-09-09 PROCEDURE — 3288F FALL RISK ASSESSMENT DOCD: CPT | Mod: CPTII,S$GLB,, | Performed by: ANESTHESIOLOGY

## 2022-09-09 PROCEDURE — 1101F PT FALLS ASSESS-DOCD LE1/YR: CPT | Mod: CPTII,S$GLB,, | Performed by: ANESTHESIOLOGY

## 2022-09-09 PROCEDURE — 1157F PR ADVANCE CARE PLAN OR EQUIV PRESENT IN MEDICAL RECORD: ICD-10-PCS | Mod: CPTII,S$GLB,, | Performed by: ANESTHESIOLOGY

## 2022-09-09 PROCEDURE — 99204 OFFICE O/P NEW MOD 45 MIN: CPT | Mod: S$GLB,,, | Performed by: ANESTHESIOLOGY

## 2022-09-09 PROCEDURE — 1101F PR PT FALLS ASSESS DOC 0-1 FALLS W/OUT INJ PAST YR: ICD-10-PCS | Mod: CPTII,S$GLB,, | Performed by: ANESTHESIOLOGY

## 2022-09-09 PROCEDURE — 3051F HG A1C>EQUAL 7.0%<8.0%: CPT | Mod: CPTII,S$GLB,, | Performed by: ANESTHESIOLOGY

## 2022-09-09 PROCEDURE — 3074F PR MOST RECENT SYSTOLIC BLOOD PRESSURE < 130 MM HG: ICD-10-PCS | Mod: CPTII,S$GLB,, | Performed by: ANESTHESIOLOGY

## 2022-09-09 PROCEDURE — 3051F PR MOST RECENT HEMOGLOBIN A1C LEVEL 7.0 - < 8.0%: ICD-10-PCS | Mod: CPTII,S$GLB,, | Performed by: ANESTHESIOLOGY

## 2022-09-09 PROCEDURE — 3288F PR FALLS RISK ASSESSMENT DOCUMENTED: ICD-10-PCS | Mod: CPTII,S$GLB,, | Performed by: ANESTHESIOLOGY

## 2022-09-09 PROCEDURE — 99999 PR PBB SHADOW E&M-EST. PATIENT-LVL IV: ICD-10-PCS | Mod: PBBFAC,,, | Performed by: ANESTHESIOLOGY

## 2022-09-09 PROCEDURE — 1125F PR PAIN SEVERITY QUANTIFIED, PAIN PRESENT: ICD-10-PCS | Mod: CPTII,S$GLB,, | Performed by: ANESTHESIOLOGY

## 2022-09-09 PROCEDURE — 3066F PR DOCUMENTATION OF TREATMENT FOR NEPHROPATHY: ICD-10-PCS | Mod: CPTII,S$GLB,, | Performed by: ANESTHESIOLOGY

## 2022-09-09 PROCEDURE — 3061F PR NEG MICROALBUMINURIA RESULT DOCUMENTED/REVIEW: ICD-10-PCS | Mod: CPTII,S$GLB,, | Performed by: ANESTHESIOLOGY

## 2022-09-09 RX ORDER — ONDANSETRON 8 MG/1
8 TABLET, ORALLY DISINTEGRATING ORAL EVERY 6 HOURS PRN
COMMUNITY
Start: 2022-07-18

## 2022-09-09 NOTE — PROGRESS NOTES
This note was completed with dictation software and grammatical errors may exist.    Chief Complaint   Patient presents with    Back Pain        HPI: William Manuel Jr. is a 71 y.o. year old male patient who has a past medical history of Acute GI bleeding, Anticoagulant long-term use, Anxiety, Arthritis, BPH (benign prostatic hyperplasia), Claustrophobia, Coronary artery disease, Diabetes mellitus, GERD without esophagitis, H/O heart bypass surgery, Hypertension, Hypogonadism in male, Migraine headache, and VONNIE on CPAP. He presents in referral from Dr. Zach Almaraz for back and right leg pain.  The patient reports that over the last year he has had worsening back pain with pain radiating into his right leg to the calf and foot.  It is worse with standing, walking, worse with doing any lifting, he gets some relief with medications.  He describes the pain as sharp and shooting.  He would undergone cardiac stent about 1 year ago and since that time has been on Effient and Xarelto, could not have any spine interventions since that time.  He reports that he has previously seen Dr. Jeffers, spine surgeon for his back but apparently had such severe cervical stenosis, he required surgery at C3 through C7 prior to any consideration for the lumbar spine.  Set after his cervical surgery, he has done well in respect to his neck and arms, balance but he also admits that he did not notice the symptoms from his cervical spine previously.  What bothers him the most is his constant low back pain and right leg pain.  The worse seems to be his back pain, this is provoked with standing and walking, somewhat improved with sitting down.  However he still even has pain with sitting.  He denies any yuliana weakness in the legs, bowel or bladder incontinence.  He believes that he had several block procedures in terms of medial branch blocks for his low back in the past but had not proceeded with radiofrequency ablation.  He states that  he had 3 injections for blocks but that does not seem to make sense.          Pain intervention history: Previous cervical injections.  He previously had an injection in his lumbar spine in May, 2021, possibly with some relief.  He states that he had 3 injections for blocks but that does not seem to make sense, I suspect 1 of these may have been an epidural steroid injection.    Spine surgeries:  He underwent cervical fusion, C4-6 corpectomy C3-7 anterior fusion, with Dr. Jeffers in 11/2020.     Antineuropathics:  NSAIDs:    Physical therapy:  He underwent physical therapy this last year in  Antidepressants:  Muscle relaxers:  Opioids:  2 tablets of 10/325 Percocet 4 times daily  Antiplatelets/Anticoagulants:  Effient 10 mg, Xarelto        ROS:  He reports back pain only.  Balance of review of systems is negative.    Lab Results   Component Value Date    HGBA1C 7.5 (H) 05/16/2022       Lab Results   Component Value Date    WBC 7.66 05/16/2022    HGB 16.3 05/16/2022    HCT 48.7 05/16/2022    MCV 92 05/16/2022     05/16/2022             Past Medical History:   Diagnosis Date    Acute GI bleeding 2016    Anticoagulant long-term use     Anxiety     Arthritis     BPH (benign prostatic hyperplasia)     Claustrophobia     Coronary artery disease     Diabetes mellitus     GERD without esophagitis     H/O heart bypass surgery     Hypertension     Hypogonadism in male     Migraine headache     VONNIE on CPAP        Past Surgical History:   Procedure Laterality Date    APPENDECTOMY      CERVICAL SPINE SURGERY  11/2020    by Dr. Jeffers     COLONOSCOPY N/A 5/12/2016    Procedure: COLONOSCOPY;  Surgeon: Larry Ortega MD;  Location: [facility] ENDO;  Service: Endoscopy;  Laterality: N/A;    CORONARY ANGIOGRAPHY N/A 6/1/2019    Procedure: ANGIOGRAM, CORONARY ARTERY;  Surgeon: Mando Pickett MD;  Location: [facility] CATH;  Service: Cardiology;  Laterality: N/A;    CORONARY ANGIOPLASTY WITH STENT PLACEMENT      CORONARY ARTERY BYPASS  GRAFT  2010    x 6    EYE SURGERY      cataract w/iol right only    GASTRECTOMY      JOINT REPLACEMENT      knee replacement Bilateral     LAPAROSCOPIC GASTRIC BANDING      LAPAROSCOPIC GASTRIC BANDING  2013    LEFT HEART CATHETERIZATION Left 6/1/2019    Procedure: CATHETERIZATION, HEART, LEFT;  Surgeon: Mando Pickett MD;  Location: [facility] CATH;  Service: Cardiology;  Laterality: Left;    REMOVAL OF IMPLANTABLE LOOP RECORDER N/A 9/4/2018    Procedure: REMOVAL, IMPLANTABLE LOOP RECORDER;  Surgeon: Vahe Singh III, MD;  Location: [facility] CATH;  Service: Cardiology;  Laterality: N/A;    STERNUM SEPARATION REPAIR  2011       Social History     Socioeconomic History    Marital status:    Occupational History    Occupation: retired-heavy equipment   Tobacco Use    Smoking status: Never    Smokeless tobacco: Never   Substance and Sexual Activity    Alcohol use: No    Drug use: No    Sexual activity: Yes     Partners: Female   Social History Narrative             Medications/Allergies: See med card    Vitals:    09/09/22 0858   BP: 129/85   Pulse: 92   SpO2: 95%   Weight: 112.4 kg (247 lb 12.8 oz)   Height: 6' (1.829 m)   PainSc:   8   PainLoc: Back     Body mass index is 33.61 kg/m².    Physical exam:  Gen: A and O x3, pleasant, well-groomed  Skin: No rashes or obvious lesions  HEENT: PERRLA, no obvious deformities on ears or in canals. Trachea midline.  CVS: Regular rate and rhythm, normal palpable pulses.  Resp:No increased work of breathing, symmetrical chest rise.  Abdomen: Soft, NT/ND.  Musculoskeletal:  Forward leaning gait          Neuro:    Iliopsoas Quadriceps Knee  Flexion Tibialis  anterior Gastro- cnemius EHL   Lower: R 5/5 5/5 5/5 5/5 5/5 5/5    L 5/5 5/5 5/5 5/5 5/5 5/5      Left  Right    Patellar DTR 0+ 0+   Achilles DTR 0+ 0+                    Intact and symmetrical to light touch and pinprick in L1-S1 dermatomes bilaterally.    Lumbar spine:  Lumbar spine:  Range of motion slightly reduced  with forward flexion, severely reduced with extension with increased pain in the back and right buttock.    Jimbo's test causes no increased pain on either side.    Supine straight leg raise is positive for right leg pain.   Internal and external rotation of the hip causes no increased pain on either side.  Myofascial exam: No tenderness to palpation across lumbar paraspinous muscles.    Imagin21 MRI L-spine:  L2-L3: Bilateral facet degenerative change.   L3-L4: Minimal posterior disc bulge and bilateral facet degenerative change.  Minimal spinal canal stenosis.  Mild right neural foraminal narrowing.   L4-L5: Minimal posterior broad-based disc bulge with superimposed right paracentral disc protrusion.  Protruding segment measures 13 mm TV by 3 mm AP.  There is also disc fissure in the right paracentral region.  Bilateral facet degenerative change.  Mild spinal canal stenosis.  Narrowing of the right lateral recess.  Mild right neural foraminal narrowing.   L5-S1: Mild posterior disc bulge with superimposed left paracentral disc fissure.  Bilateral facet degenerative change moderate right and severe left neural foraminal narrowing.       Assessment:  William Manuel Jr. is a 71 y.o. year old male patient who has a past medical history of Acute GI bleeding, Anticoagulant long-term use, Anxiety, Arthritis, BPH (benign prostatic hyperplasia), Claustrophobia, Coronary artery disease, Diabetes mellitus, GERD without esophagitis, H/O heart bypass surgery, Hypertension, Hypogonadism in male, Migraine headache, and VONNIE on CPAP. He presents in referral from Dr. Zach Almaraz for back and right leg pain.    1. Lumbar radiculopathy        2. Lumbar spondylosis        3. Chronic midline low back pain with right-sided sciatica  Ambulatory referral/consult to Pain Clinic      4. Spinal stenosis of lumbar region without neurogenic claudication            Plan:  1. We discussed that he likely had 2 separate medial  branch blocks and an epidural steroid injection.  I would like to see the results of these prior to make any decisions on treatment.  If he has had 2 successful medial branch blocks, hopefully we can proceed with radiofrequency ablation.  We discussed that in that case, we would just have him hold his anticoagulation but not his anti-platelet medication.  2. We also discussed that for his right leg pain, he may benefit from an epidural, for this he would need to stop both anti-platelet and anticoagulation.  Once we get records from his previous provider we will contact him.        Thank you for referring this interesting patient, and I look forward to continuing to collaborate in his care.

## 2022-09-15 ENCOUNTER — PATIENT MESSAGE (OUTPATIENT)
Dept: PAIN MEDICINE | Facility: CLINIC | Age: 72
End: 2022-09-15
Payer: MEDICARE

## 2022-09-26 ENCOUNTER — PATIENT MESSAGE (OUTPATIENT)
Dept: FAMILY MEDICINE | Facility: CLINIC | Age: 72
End: 2022-09-26

## 2022-09-26 ENCOUNTER — PATIENT MESSAGE (OUTPATIENT)
Dept: PAIN MEDICINE | Facility: CLINIC | Age: 72
End: 2022-09-26
Payer: MEDICARE

## 2022-09-26 ENCOUNTER — OFFICE VISIT (OUTPATIENT)
Dept: FAMILY MEDICINE | Facility: CLINIC | Age: 72
End: 2022-09-26
Payer: MEDICARE

## 2022-09-26 ENCOUNTER — HOSPITAL ENCOUNTER (OUTPATIENT)
Dept: RADIOLOGY | Facility: HOSPITAL | Age: 72
Discharge: HOME | End: 2022-09-26
Attending: INTERNAL MEDICINE
Payer: MEDICARE

## 2022-09-26 VITALS
WEIGHT: 253.06 LBS | BODY MASS INDEX: 34.28 KG/M2 | HEART RATE: 71 BPM | HEIGHT: 72 IN | OXYGEN SATURATION: 95 % | SYSTOLIC BLOOD PRESSURE: 116 MMHG | DIASTOLIC BLOOD PRESSURE: 70 MMHG

## 2022-09-26 DIAGNOSIS — E29.1 HYPOGONADISM IN MALE: ICD-10-CM

## 2022-09-26 DIAGNOSIS — E11.59 TYPE 2 DIABETES MELLITUS WITH OTHER CIRCULATORY COMPLICATION, WITHOUT LONG-TERM CURRENT USE OF INSULIN: ICD-10-CM

## 2022-09-26 DIAGNOSIS — M79.2 RADICULAR PAIN OF RIGHT UPPER EXTREMITY: ICD-10-CM

## 2022-09-26 DIAGNOSIS — M54.41 CHRONIC MIDLINE LOW BACK PAIN WITH RIGHT-SIDED SCIATICA: Primary | ICD-10-CM

## 2022-09-26 DIAGNOSIS — E11.65 TYPE 2 DIABETES MELLITUS WITH HYPERGLYCEMIA, WITHOUT LONG-TERM CURRENT USE OF INSULIN: ICD-10-CM

## 2022-09-26 DIAGNOSIS — I25.10 CORONARY ARTERY DISEASE INVOLVING NATIVE CORONARY ARTERY OF NATIVE HEART WITHOUT ANGINA PECTORIS: ICD-10-CM

## 2022-09-26 DIAGNOSIS — E66.01 CLASS 2 SEVERE OBESITY DUE TO EXCESS CALORIES WITH SERIOUS COMORBIDITY AND BODY MASS INDEX (BMI) OF 36.0 TO 36.9 IN ADULT: ICD-10-CM

## 2022-09-26 DIAGNOSIS — Z12.5 PROSTATE CANCER SCREENING: ICD-10-CM

## 2022-09-26 DIAGNOSIS — D75.1 POLYCYTHEMIA: ICD-10-CM

## 2022-09-26 DIAGNOSIS — I10 ESSENTIAL HYPERTENSION: ICD-10-CM

## 2022-09-26 DIAGNOSIS — M25.511 ACUTE PAIN OF RIGHT SHOULDER: ICD-10-CM

## 2022-09-26 DIAGNOSIS — G89.29 CHRONIC MIDLINE LOW BACK PAIN WITH RIGHT-SIDED SCIATICA: Primary | ICD-10-CM

## 2022-09-26 PROCEDURE — 3008F BODY MASS INDEX DOCD: CPT | Mod: CPTII,S$GLB,, | Performed by: INTERNAL MEDICINE

## 2022-09-26 PROCEDURE — 73030 X-RAY EXAM OF SHOULDER: CPT | Mod: 26,RT,, | Performed by: RADIOLOGY

## 2022-09-26 PROCEDURE — 1160F RVW MEDS BY RX/DR IN RCRD: CPT | Mod: CPTII,S$GLB,, | Performed by: INTERNAL MEDICINE

## 2022-09-26 PROCEDURE — 3078F PR MOST RECENT DIASTOLIC BLOOD PRESSURE < 80 MM HG: ICD-10-PCS | Mod: CPTII,S$GLB,, | Performed by: INTERNAL MEDICINE

## 2022-09-26 PROCEDURE — 3074F PR MOST RECENT SYSTOLIC BLOOD PRESSURE < 130 MM HG: ICD-10-PCS | Mod: CPTII,S$GLB,, | Performed by: INTERNAL MEDICINE

## 2022-09-26 PROCEDURE — 99214 PR OFFICE/OUTPT VISIT, EST, LEVL IV, 30-39 MIN: ICD-10-PCS | Mod: 25,S$GLB,, | Performed by: INTERNAL MEDICINE

## 2022-09-26 PROCEDURE — 90694 VACC AIIV4 NO PRSRV 0.5ML IM: CPT | Mod: S$GLB,,, | Performed by: INTERNAL MEDICINE

## 2022-09-26 PROCEDURE — 3078F DIAST BP <80 MM HG: CPT | Mod: CPTII,S$GLB,, | Performed by: INTERNAL MEDICINE

## 2022-09-26 PROCEDURE — 3051F PR MOST RECENT HEMOGLOBIN A1C LEVEL 7.0 - < 8.0%: ICD-10-PCS | Mod: CPTII,S$GLB,, | Performed by: INTERNAL MEDICINE

## 2022-09-26 PROCEDURE — 72050 XR CERVICAL SPINE COMPLETE 5 VIEW: ICD-10-PCS | Mod: 26,,, | Performed by: RADIOLOGY

## 2022-09-26 PROCEDURE — 1101F PR PT FALLS ASSESS DOC 0-1 FALLS W/OUT INJ PAST YR: ICD-10-PCS | Mod: CPTII,S$GLB,, | Performed by: INTERNAL MEDICINE

## 2022-09-26 PROCEDURE — 73030 X-RAY EXAM OF SHOULDER: CPT | Mod: TC,FY,PO,RT

## 2022-09-26 PROCEDURE — G0008 ADMIN INFLUENZA VIRUS VAC: HCPCS | Mod: S$GLB,,, | Performed by: INTERNAL MEDICINE

## 2022-09-26 PROCEDURE — 90694 FLU VACCINE - QUADRIVALENT - ADJUVANTED: ICD-10-PCS | Mod: S$GLB,,, | Performed by: INTERNAL MEDICINE

## 2022-09-26 PROCEDURE — 1157F PR ADVANCE CARE PLAN OR EQUIV PRESENT IN MEDICAL RECORD: ICD-10-PCS | Mod: CPTII,S$GLB,, | Performed by: INTERNAL MEDICINE

## 2022-09-26 PROCEDURE — 1125F AMNT PAIN NOTED PAIN PRSNT: CPT | Mod: CPTII,S$GLB,, | Performed by: INTERNAL MEDICINE

## 2022-09-26 PROCEDURE — 1101F PT FALLS ASSESS-DOCD LE1/YR: CPT | Mod: CPTII,S$GLB,, | Performed by: INTERNAL MEDICINE

## 2022-09-26 PROCEDURE — 72050 X-RAY EXAM NECK SPINE 4/5VWS: CPT | Mod: TC,FY,PO

## 2022-09-26 PROCEDURE — 4010F ACE/ARB THERAPY RXD/TAKEN: CPT | Mod: CPTII,S$GLB,, | Performed by: INTERNAL MEDICINE

## 2022-09-26 PROCEDURE — 1125F PR PAIN SEVERITY QUANTIFIED, PAIN PRESENT: ICD-10-PCS | Mod: CPTII,S$GLB,, | Performed by: INTERNAL MEDICINE

## 2022-09-26 PROCEDURE — 73030 XR SHOULDER COMPLETE 2 OR MORE VIEWS RIGHT: ICD-10-PCS | Mod: 26,RT,, | Performed by: RADIOLOGY

## 2022-09-26 PROCEDURE — 1159F PR MEDICATION LIST DOCUMENTED IN MEDICAL RECORD: ICD-10-PCS | Mod: CPTII,S$GLB,, | Performed by: INTERNAL MEDICINE

## 2022-09-26 PROCEDURE — 4010F PR ACE/ARB THEARPY RXD/TAKEN: ICD-10-PCS | Mod: CPTII,S$GLB,, | Performed by: INTERNAL MEDICINE

## 2022-09-26 PROCEDURE — 72050 X-RAY EXAM NECK SPINE 4/5VWS: CPT | Mod: 26,,, | Performed by: RADIOLOGY

## 2022-09-26 PROCEDURE — 3288F PR FALLS RISK ASSESSMENT DOCUMENTED: ICD-10-PCS | Mod: CPTII,S$GLB,, | Performed by: INTERNAL MEDICINE

## 2022-09-26 PROCEDURE — 1160F PR REVIEW ALL MEDS BY PRESCRIBER/CLIN PHARMACIST DOCUMENTED: ICD-10-PCS | Mod: CPTII,S$GLB,, | Performed by: INTERNAL MEDICINE

## 2022-09-26 PROCEDURE — 1157F ADVNC CARE PLAN IN RCRD: CPT | Mod: CPTII,S$GLB,, | Performed by: INTERNAL MEDICINE

## 2022-09-26 PROCEDURE — G0008 FLU VACCINE - QUADRIVALENT - ADJUVANTED: ICD-10-PCS | Mod: S$GLB,,, | Performed by: INTERNAL MEDICINE

## 2022-09-26 PROCEDURE — 3061F PR NEG MICROALBUMINURIA RESULT DOCUMENTED/REVIEW: ICD-10-PCS | Mod: CPTII,S$GLB,, | Performed by: INTERNAL MEDICINE

## 2022-09-26 PROCEDURE — 99214 OFFICE O/P EST MOD 30 MIN: CPT | Mod: 25,S$GLB,, | Performed by: INTERNAL MEDICINE

## 2022-09-26 PROCEDURE — 3008F PR BODY MASS INDEX (BMI) DOCUMENTED: ICD-10-PCS | Mod: CPTII,S$GLB,, | Performed by: INTERNAL MEDICINE

## 2022-09-26 PROCEDURE — 3288F FALL RISK ASSESSMENT DOCD: CPT | Mod: CPTII,S$GLB,, | Performed by: INTERNAL MEDICINE

## 2022-09-26 PROCEDURE — 3074F SYST BP LT 130 MM HG: CPT | Mod: CPTII,S$GLB,, | Performed by: INTERNAL MEDICINE

## 2022-09-26 PROCEDURE — 3061F NEG MICROALBUMINURIA REV: CPT | Mod: CPTII,S$GLB,, | Performed by: INTERNAL MEDICINE

## 2022-09-26 PROCEDURE — 3066F PR DOCUMENTATION OF TREATMENT FOR NEPHROPATHY: ICD-10-PCS | Mod: CPTII,S$GLB,, | Performed by: INTERNAL MEDICINE

## 2022-09-26 PROCEDURE — 3051F HG A1C>EQUAL 7.0%<8.0%: CPT | Mod: CPTII,S$GLB,, | Performed by: INTERNAL MEDICINE

## 2022-09-26 PROCEDURE — 3066F NEPHROPATHY DOC TX: CPT | Mod: CPTII,S$GLB,, | Performed by: INTERNAL MEDICINE

## 2022-09-26 PROCEDURE — 99999 PR PBB SHADOW E&M-EST. PATIENT-LVL V: ICD-10-PCS | Mod: PBBFAC,,, | Performed by: INTERNAL MEDICINE

## 2022-09-26 PROCEDURE — 1159F MED LIST DOCD IN RCRD: CPT | Mod: CPTII,S$GLB,, | Performed by: INTERNAL MEDICINE

## 2022-09-26 PROCEDURE — 99999 PR PBB SHADOW E&M-EST. PATIENT-LVL V: CPT | Mod: PBBFAC,,, | Performed by: INTERNAL MEDICINE

## 2022-09-26 RX ORDER — ETODOLAC 400 MG/1
400 TABLET, FILM COATED ORAL DAILY
Qty: 90 TABLET | Refills: 3 | Status: SHIPPED | OUTPATIENT
Start: 2022-09-26 | End: 2022-12-29

## 2022-09-26 RX ORDER — METHYLPREDNISOLONE 4 MG/1
TABLET ORAL
Qty: 1 EACH | Refills: 0 | Status: SHIPPED | OUTPATIENT
Start: 2022-09-26 | End: 2022-09-26

## 2022-09-26 NOTE — PROGRESS NOTES
Patient ID: William Manuel .     Chief Complaint:   Chief Complaint   Patient presents with    Hypertension     4 month f/u         HPI:  Routine follow-up and I would like to say that he is doing well but he has got a new issue.  We do control his chronic lower back pain with right-sided sciatica with Percocet.  He has been waiting a year for his stents to be mature so that he can stop the Effient and get a rhizotomy and thankfully that is closer to being accomplished but his pain management doctor is waiting on outside records from his previous pain management doctor.  I have sent a message to see if I can speed that process along.  In the meantime, his right shoulder has been hurting him exquisitely for the past few weeks months.  Seems that he had an old injury there when he had a bowl with a 2 by for as a younger man.  I did stop his etodolac when he started the Effient because I did not want him to bleed and we think that on covered this pain that has been lingering for quite a while.  He does relate that the pain goes down his arm and he feels it mainly in his distal forearm so I am not sure if it is a rotator cuff tear or a pinched nerve in his neck some going to investigate both and put him back on the etodolac since his Effient has recently been stopped.  He does question the utility of restarting his testosterone supplementation but I am concerned about causing the clot because that is probably the reason why he had his original clot.  We will discuss testosterone at a later date once we get some control over his shoulder pain and back pain.  His A1c is controlled at 7.3.  I had consider giving him a steroid pack but I do not want to run a sugar to high so I think the anti-inflammatories a better course of action.  His last CBC did not show polycythemia.  He does consent to a flu shot today and I will get a diabetic eye exam on him before the years out.    Review of Systems   Constitutional:  Negative.    HENT: Negative.     Eyes: Negative.    Respiratory: Negative.     Cardiovascular: Negative.    Gastrointestinal: Negative.    Endocrine: Negative.    Genitourinary: Negative.    Musculoskeletal:  Positive for arthralgias.   Skin: Negative.    Allergic/Immunologic: Negative.    Neurological: Negative.    Hematological: Negative.    Psychiatric/Behavioral: Negative.          Objective:      Physical Exam   Physical Exam  Vitals and nursing note reviewed.   Constitutional:       Appearance: Normal appearance. He is well-developed. He is obese.   HENT:      Head: Normocephalic and atraumatic.      Nose: Nose normal.   Eyes:      Extraocular Movements: Extraocular movements intact.      Conjunctiva/sclera: Conjunctivae normal.      Pupils: Pupils are equal, round, and reactive to light.   Cardiovascular:      Rate and Rhythm: Normal rate and regular rhythm.      Pulses: Normal pulses.      Heart sounds: Normal heart sounds.   Pulmonary:      Effort: Pulmonary effort is normal.      Breath sounds: Normal breath sounds.   Abdominal:      General: Bowel sounds are normal.      Palpations: Abdomen is soft.   Musculoskeletal:         General: Normal range of motion.      Cervical back: Normal range of motion and neck supple.      Comments: Decreased Range of Motion in Right shoulder    Skin:     General: Skin is warm and dry.      Capillary Refill: Capillary refill takes less than 2 seconds.   Neurological:      General: No focal deficit present.      Mental Status: He is alert and oriented to person, place, and time.   Psychiatric:         Mood and Affect: Mood normal.         Behavior: Behavior normal.         Thought Content: Thought content normal.         Judgment: Judgment normal.          Vitals:   Vitals:    09/26/22 1436   BP: 116/70   Pulse: 71   SpO2: 95%   Weight: 114.8 kg (253 lb 1.4 oz)   Height: 6' (1.829 m)          Current Outpatient Medications:     amLODIPine-benazepriL (LOTREL) 10-40 mg per  capsule, TAKE 1 CAPSULE DAILY, Disp: 90 capsule, Rfl: 3    aspirin 81 MG Chew, Take 325 mg by mouth once daily. , Disp: , Rfl:     blood sugar diagnostic Strp, To check BG 2 times daily, to use with insurance preferred meter, Disp: 200 strip, Rfl: 12    blood-glucose meter kit, To check BG 2 times daily, to use with insurance preferred meter, Disp: 1 each, Rfl: 0    clonazePAM (KLONOPIN) 0.5 MG tablet, TAKE 1 TABLET TWICE A DAY, Disp: 180 tablet, Rfl: 0    ezetimibe (ZETIA) 10 mg tablet, Take 10 mg by mouth once daily., Disp: , Rfl:     furosemide (LASIX) 40 MG tablet, TAKE 1 TABLET DAILY, Disp: 90 tablet, Rfl: 3    glipiZIDE (GLUCOTROL) 5 MG tablet, TAKE 1 TABLET TWICE A DAY BEFORE MEALS, Disp: 180 tablet, Rfl: 1    lancets Misc, To check BG 2 times daily, to use with insurance preferred meter, Disp: 200 each, Rfl: 12    metoprolol succinate (TOPROL-XL) 100 MG 24 hr tablet, TAKE 1 TABLET DAILY, Disp: 90 tablet, Rfl: 3    omeprazole (PRILOSEC) 40 MG capsule, TAKE 1 CAPSULE DAILY, Disp: 90 capsule, Rfl: 3    ondansetron (ZOFRAN-ODT) 8 MG TbDL, ondansetron Take 1 tablet 3 times per day PRN - Nausea 20220718 tablet,disintegrating 3 times per day No route recorded No set duration recorded No set duration amount recorded active 8 mg, Disp: , Rfl:     oxyCODONE-acetaminophen (PERCOCET)  mg per tablet, Take 1.5 tablets by mouth every 6 (six) hours as needed for Pain., Disp: 180 tablet, Rfl: 0    promethazine (PHENERGAN) 25 MG tablet, Take 1 tablet (25 mg total) by mouth every 6 (six) hours as needed for Nausea., Disp: 20 tablet, Rfl: 0    rivaroxaban (XARELTO ORAL), Xarelto, Disp: , Rfl:     rosuvastatin (CRESTOR) 20 MG tablet, Take 20 mg by mouth once daily., Disp: , Rfl:     tamsulosin (FLOMAX) 0.4 mg Cap, TAKE 1 CAPSULE DAILY, Disp: 90 capsule, Rfl: 3    testosterone cypionate (DEPOTESTOTERONE CYPIONATE) 200 mg/mL injection, Inject 0.5 mLs (100 mg total) into the muscle every 14 (fourteen) days., Disp: 5 mL, Rfl:  3    zolpidem (AMBIEN) 10 mg Tab, TAKE 1 TABLET(10 MG) BY MOUTH EVERY NIGHT AS NEEDED FOR INSOMNIA, Disp: 30 tablet, Rfl: 5    etodolac (LODINE) 400 MG tablet, Take 1 tablet (400 mg total) by mouth once daily. UP TO three times daily, Disp: 90 tablet, Rfl: 3   Assessment:       Patient Active Problem List    Diagnosis Date Noted    Thoracic aortic aneurysm, without rupture 01/21/2022    History of pulmonary embolism 10/21/2021    Cough 10/21/2021    Chronic midline low back pain with right-sided sciatica 10/21/2021    Lumbar degenerative disc disease 03/22/2021    Sciatic nerve pain, right 03/22/2021    Insomnia due to medical condition 07/30/2020    Fatigue 07/30/2020    Other headache syndrome 07/07/2020    Morbid (severe) obesity due to excess calories 07/18/2019    Opiate withdrawal 07/18/2019    Vasospasm 07/18/2019    Class 2 obesity due to excess calories with body mass index (BMI) of 36.0 to 36.9 in adult 07/18/2019    VONNIE on CPAP     GERD without esophagitis     BPH (benign prostatic hyperplasia)     Anxiety     Unstable angina 05/31/2019    Renal insufficiency 05/31/2019    Benign prostatic hyperplasia with incomplete bladder emptying 03/13/2018    Acute urinary retention 03/13/2018    Right lower quadrant abdominal pain 03/11/2018    Flank pain, acute 03/11/2018    Migraine headache     H/O heart bypass surgery     Claustrophobia     Anticoagulant long-term use     Spondylosis without myelopathy or radiculopathy, cervical region 05/23/2016    Essential hypertension 05/11/2016    Coronary artery disease involving native coronary artery of native heart without angina pectoris 05/11/2016    Polycythemia 05/11/2016    Acute lower GI bleeding 05/11/2016    Dyslipidemia 05/11/2016    Gastroesophageal reflux disease without esophagitis 05/11/2016    Hypogonadism in male 05/11/2016    Migraine without aura and without status migrainosus, not intractable 05/11/2016    Primary osteoarthritis involving multiple  joints 05/11/2016          Plan:       William Manuel Jr.  was seen today for follow-up and may need lab work.    Diagnoses and all orders for this visit:    William was seen today for hypertension.    Diagnoses and all orders for this visit:    Chronic midline low back pain with right-sided sciatica  Managed with Percocet   To get Rhizotomy soon     Acute pain of right shoulder  -     X-ray Shoulder 2 or More Views Right; Future  -     Discontinue: methylPREDNISolone (MEDROL DOSEPACK) 4 mg tablet; use as directed  -     etodolac (LODINE) 400 MG tablet; Take 1 tablet (400 mg total) by mouth once daily. UP TO three times daily    Radicular pain of right upper extremity  -     X-Ray Cervical Spine Complete 5 view; Future  -     Discontinue: methylPREDNISolone (MEDROL DOSEPACK) 4 mg tablet; use as directed    Type 2 diabetes mellitus with hyperglycemia, without long-term current use of insulin  Controlled with med     Essential hypertension  Controlled with med     Coronary artery disease involving native coronary artery of native heart without angina pectoris  Stable  Only on aspirin for now     Polycythemia  Monitor     Class 2 severe obesity due to excess calories with serious comorbidity and body mass index (BMI) of 36.0 to 36.9 in adult  Monitor    Type 2 diabetes mellitus with other circulatory complication, without long-term current use of insulin  Controlled with med     Hypogonadism in male   Consider restarting Testosterone

## 2022-09-27 NOTE — TELEPHONE ENCOUNTER
The patient underwent 1st L3/4, L4/5 and L5/S1 bilateral medial branch block on 07/26/2021 and the 2nd block done on 08/09/2021 with greater than 80% relief but the patient was not able to have the radiofrequency ablation due to cardiac issues.  He is now able to undergo the procedure.    Type of Procedure/Injection - Lumbar Radiofrequency Ablation  L3/4, L4/5, and L5/S1           Laterality - Bilateral      Type of Sedation - RNIV      Need to hold medication - yes      Xarelto for 3 days Can stay on Aspirin      Clearance needed - yes      Follow up - 3 week

## 2022-09-28 ENCOUNTER — PATIENT MESSAGE (OUTPATIENT)
Dept: PAIN MEDICINE | Facility: CLINIC | Age: 72
End: 2022-09-28
Payer: MEDICARE

## 2022-09-30 DIAGNOSIS — M47.816 LUMBAR SPONDYLOSIS: Primary | ICD-10-CM

## 2022-09-30 RX ORDER — SODIUM CHLORIDE, SODIUM LACTATE, POTASSIUM CHLORIDE, CALCIUM CHLORIDE 600; 310; 30; 20 MG/100ML; MG/100ML; MG/100ML; MG/100ML
INJECTION, SOLUTION INTRAVENOUS CONTINUOUS
Status: CANCELLED | OUTPATIENT
Start: 2022-10-11

## 2022-10-11 ENCOUNTER — TELEPHONE (OUTPATIENT)
Dept: FAMILY MEDICINE | Facility: CLINIC | Age: 72
End: 2022-10-11
Payer: MEDICARE

## 2022-10-11 DIAGNOSIS — G89.29 CHRONIC MIDLINE LOW BACK PAIN WITH RIGHT-SIDED SCIATICA: ICD-10-CM

## 2022-10-11 DIAGNOSIS — M54.41 CHRONIC MIDLINE LOW BACK PAIN WITH RIGHT-SIDED SCIATICA: ICD-10-CM

## 2022-10-11 PROBLEM — M47.816 LUMBAR SPONDYLOSIS: Status: ACTIVE | Noted: 2022-10-11

## 2022-10-11 RX ORDER — OXYCODONE AND ACETAMINOPHEN 10; 325 MG/1; MG/1
1.5 TABLET ORAL EVERY 6 HOURS PRN
Qty: 180 TABLET | Refills: 0 | Status: SHIPPED | OUTPATIENT
Start: 2022-10-11 | End: 2022-11-09 | Stop reason: SDUPTHER

## 2022-10-11 NOTE — TELEPHONE ENCOUNTER
----- Message from Deepak Colby sent at 10/11/2022  2:01 PM CDT -----  Type:  RX Refill Request    Who Called:  patient's wife  Refill or New Rx:  refill  RX Name and Strength:  Percocet   How is the patient currently taking it? (ex. 1XDay):    Is this a 30 day or 90 day RX:  30  Preferred Pharmacy with phone number:    [id number] - [place], [place] - [address] AT [facility] [address] [address]  [address] [address]  Phone: [phone] Fax: [phone]    Local or Mail Order:  local  Ordering Provider:  Sung Juarez Call Back Number:  [phone]   Additional Information:

## 2022-11-21 DIAGNOSIS — M25.511 PAIN IN RIGHT SHOULDER: Primary | ICD-10-CM

## 2022-11-30 DIAGNOSIS — M25.512 PAIN IN LEFT SHOULDER: Primary | ICD-10-CM

## 2022-12-06 ENCOUNTER — HOSPITAL ENCOUNTER (OUTPATIENT)
Dept: RADIOLOGY | Facility: HOSPITAL | Age: 72
Discharge: HOME | End: 2022-12-06
Attending: ORTHOPAEDIC SURGERY
Payer: MEDICARE

## 2022-12-06 DIAGNOSIS — M25.512 PAIN IN LEFT SHOULDER: ICD-10-CM

## 2022-12-06 DIAGNOSIS — M25.511 PAIN IN RIGHT SHOULDER: ICD-10-CM

## 2022-12-06 PROCEDURE — 73221 MRI JOINT UPR EXTREM W/O DYE: CPT | Mod: TC,RT

## 2022-12-16 ENCOUNTER — OFFICE VISIT (OUTPATIENT)
Dept: PAIN MEDICINE | Facility: CLINIC | Age: 72
End: 2022-12-16
Payer: MEDICARE

## 2022-12-16 VITALS
HEIGHT: 72 IN | OXYGEN SATURATION: 97 % | HEART RATE: 59 BPM | DIASTOLIC BLOOD PRESSURE: 75 MMHG | BODY MASS INDEX: 35.49 KG/M2 | SYSTOLIC BLOOD PRESSURE: 115 MMHG | WEIGHT: 262 LBS

## 2022-12-16 DIAGNOSIS — M48.061 SPINAL STENOSIS OF LUMBAR REGION WITHOUT NEUROGENIC CLAUDICATION: ICD-10-CM

## 2022-12-16 DIAGNOSIS — G89.29 CHRONIC MIDLINE LOW BACK PAIN WITH RIGHT-SIDED SCIATICA: ICD-10-CM

## 2022-12-16 DIAGNOSIS — M54.41 CHRONIC MIDLINE LOW BACK PAIN WITH RIGHT-SIDED SCIATICA: ICD-10-CM

## 2022-12-16 DIAGNOSIS — M47.816 LUMBAR SPONDYLOSIS: ICD-10-CM

## 2022-12-16 DIAGNOSIS — M54.16 LUMBAR RADICULOPATHY: Primary | ICD-10-CM

## 2022-12-16 PROCEDURE — 3066F NEPHROPATHY DOC TX: CPT | Mod: CPTII,S$GLB,,

## 2022-12-16 PROCEDURE — 3078F DIAST BP <80 MM HG: CPT | Mod: CPTII,S$GLB,,

## 2022-12-16 PROCEDURE — 1101F PR PT FALLS ASSESS DOC 0-1 FALLS W/OUT INJ PAST YR: ICD-10-PCS | Mod: CPTII,S$GLB,,

## 2022-12-16 PROCEDURE — 1159F PR MEDICATION LIST DOCUMENTED IN MEDICAL RECORD: ICD-10-PCS | Mod: CPTII,S$GLB,,

## 2022-12-16 PROCEDURE — 3061F NEG MICROALBUMINURIA REV: CPT | Mod: CPTII,S$GLB,,

## 2022-12-16 PROCEDURE — 1101F PT FALLS ASSESS-DOCD LE1/YR: CPT | Mod: CPTII,S$GLB,,

## 2022-12-16 PROCEDURE — 3051F PR MOST RECENT HEMOGLOBIN A1C LEVEL 7.0 - < 8.0%: ICD-10-PCS | Mod: CPTII,S$GLB,,

## 2022-12-16 PROCEDURE — 3288F PR FALLS RISK ASSESSMENT DOCUMENTED: ICD-10-PCS | Mod: CPTII,S$GLB,,

## 2022-12-16 PROCEDURE — 3061F PR NEG MICROALBUMINURIA RESULT DOCUMENTED/REVIEW: ICD-10-PCS | Mod: CPTII,S$GLB,,

## 2022-12-16 PROCEDURE — 99999 PR PBB SHADOW E&M-EST. PATIENT-LVL V: ICD-10-PCS | Mod: PBBFAC,,,

## 2022-12-16 PROCEDURE — 3288F FALL RISK ASSESSMENT DOCD: CPT | Mod: CPTII,S$GLB,,

## 2022-12-16 PROCEDURE — 3066F PR DOCUMENTATION OF TREATMENT FOR NEPHROPATHY: ICD-10-PCS | Mod: CPTII,S$GLB,,

## 2022-12-16 PROCEDURE — 1125F AMNT PAIN NOTED PAIN PRSNT: CPT | Mod: CPTII,S$GLB,,

## 2022-12-16 PROCEDURE — 3074F SYST BP LT 130 MM HG: CPT | Mod: CPTII,S$GLB,,

## 2022-12-16 PROCEDURE — 3051F HG A1C>EQUAL 7.0%<8.0%: CPT | Mod: CPTII,S$GLB,,

## 2022-12-16 PROCEDURE — 4010F ACE/ARB THERAPY RXD/TAKEN: CPT | Mod: CPTII,S$GLB,,

## 2022-12-16 PROCEDURE — 99214 OFFICE O/P EST MOD 30 MIN: CPT | Mod: S$GLB,,,

## 2022-12-16 PROCEDURE — 1157F PR ADVANCE CARE PLAN OR EQUIV PRESENT IN MEDICAL RECORD: ICD-10-PCS | Mod: CPTII,S$GLB,,

## 2022-12-16 PROCEDURE — 3008F PR BODY MASS INDEX (BMI) DOCUMENTED: ICD-10-PCS | Mod: CPTII,S$GLB,,

## 2022-12-16 PROCEDURE — 4010F PR ACE/ARB THEARPY RXD/TAKEN: ICD-10-PCS | Mod: CPTII,S$GLB,,

## 2022-12-16 PROCEDURE — 3008F BODY MASS INDEX DOCD: CPT | Mod: CPTII,S$GLB,,

## 2022-12-16 PROCEDURE — 1125F PR PAIN SEVERITY QUANTIFIED, PAIN PRESENT: ICD-10-PCS | Mod: CPTII,S$GLB,,

## 2022-12-16 PROCEDURE — 99214 PR OFFICE/OUTPT VISIT, EST, LEVL IV, 30-39 MIN: ICD-10-PCS | Mod: S$GLB,,,

## 2022-12-16 PROCEDURE — 3074F PR MOST RECENT SYSTOLIC BLOOD PRESSURE < 130 MM HG: ICD-10-PCS | Mod: CPTII,S$GLB,,

## 2022-12-16 PROCEDURE — 1159F MED LIST DOCD IN RCRD: CPT | Mod: CPTII,S$GLB,,

## 2022-12-16 PROCEDURE — 1157F ADVNC CARE PLAN IN RCRD: CPT | Mod: CPTII,S$GLB,,

## 2022-12-16 PROCEDURE — 3078F PR MOST RECENT DIASTOLIC BLOOD PRESSURE < 80 MM HG: ICD-10-PCS | Mod: CPTII,S$GLB,,

## 2022-12-16 PROCEDURE — 99999 PR PBB SHADOW E&M-EST. PATIENT-LVL V: CPT | Mod: PBBFAC,,,

## 2022-12-16 RX ORDER — SODIUM CHLORIDE, SODIUM LACTATE, POTASSIUM CHLORIDE, CALCIUM CHLORIDE 600; 310; 30; 20 MG/100ML; MG/100ML; MG/100ML; MG/100ML
INJECTION, SOLUTION INTRAVENOUS CONTINUOUS
Status: CANCELLED | OUTPATIENT
Start: 2022-12-16

## 2022-12-16 NOTE — PROGRESS NOTES
This note was completed with dictation software and grammatical errors may exist.    Chief Complaint   Patient presents with    Back Pain        HPI: William Manuel Jr. is a 71 y.o. year old male patient who has a past medical history of Acute GI bleeding, Anticoagulant long-term use, Anxiety, Arthritis, BPH (benign prostatic hyperplasia), Claustrophobia, Coronary artery disease, Diabetes mellitus, GERD without esophagitis, H/O heart bypass surgery, History of kidney stones, Hypertension, Hypogonadism in male, Migraine headache, and VONNIE on CPAP. He presents in referral from No ref. provider found for back and right leg pain.  He is status post bilateral L3/4, L4/5 and L5/S1 radiofrequency ablation on 10/11/2022 with 0% relief.  Today he is reporting continued 10/10 back pain, constant, worsened with movement.  He states due to the severity of his pain he spends most of his time in bed which only provides him with minor relief in addition to his pain medications provided by his primary care provider.  He feels like he got some relief of his previous right-sided radicular pain from the RFA.  Today he is denying any new numbness, weakness or any new changes to his bowel bladder function.      Prior HPI: William Manuel Jr. is a 71 y.o. year old male patient who has a past medical history of Acute GI bleeding, Anticoagulant long-term use, Anxiety, Arthritis, BPH (benign prostatic hyperplasia), Claustrophobia, Coronary artery disease, Diabetes mellitus, GERD without esophagitis, H/O heart bypass surgery, History of kidney stones, Hypertension, Hypogonadism in male, Migraine headache, and VONNIE on CPAP. He presents in referral from No ref. provider found for back and right leg pain.  The patient reports that over the last year he has had worsening back pain with pain radiating into his right leg to the calf and foot.  It is worse with standing, walking, worse with doing any lifting, he gets some relief with  medications.  He describes the pain as sharp and shooting.  He would undergone cardiac stent about 1 year ago and since that time has been on Effient and Xarelto, could not have any spine interventions since that time.  He reports that he has previously seen Dr. Jeffers, spine surgeon for his back but apparently had such severe cervical stenosis, he required surgery at C3 through C7 prior to any consideration for the lumbar spine.  Set after his cervical surgery, he has done well in respect to his neck and arms, balance but he also admits that he did not notice the symptoms from his cervical spine previously.  What bothers him the most is his constant low back pain and right leg pain.  The worse seems to be his back pain, this is provoked with standing and walking, somewhat improved with sitting down.  However he still even has pain with sitting.  He denies any yuliana weakness in the legs, bowel or bladder incontinence.  He believes that he had several block procedures in terms of medial branch blocks for his low back in the past but had not proceeded with radiofrequency ablation.  He states that he had 3 injections for blocks but that does not seem to make sense.        Pain intervention history: Previous cervical injections.  He previously had an injection in his lumbar spine in May, 2021, possibly with some relief.  He states that he had 3 injections for blocks but that does not seem to make sense, I suspect 1 of these may have been an epidural steroid injection. He is status post bilateral L3/4, L4/5 and L5/S1 radiofrequency ablation on 10/11/2022 with 0% relief.    Spine surgeries:  He underwent cervical fusion, C4-6 corpectomy C3-7 anterior fusion, with Dr. Jeffers in 11/2020.     Antineuropathics:  NSAIDs:    Physical therapy:  He underwent physical therapy this last year in  Antidepressants:  Muscle relaxers:  Opioids:  2 tablets of 10/325 Percocet 4 times daily  Antiplatelets/Anticoagulants:  Effient 10 mg,  Xarelto        ROS:  He reports back pain only.  Balance of review of systems is negative.    Lab Results   Component Value Date    HGBA1C 7.5 (H) 05/16/2022       Lab Results   Component Value Date    WBC 7.66 05/16/2022    HGB 16.3 05/16/2022    HCT 48.7 05/16/2022    MCV 92 05/16/2022     05/16/2022             Past Medical History:   Diagnosis Date    Acute GI bleeding 2016    Anticoagulant long-term use     Anxiety     Arthritis     BPH (benign prostatic hyperplasia)     Claustrophobia     Coronary artery disease     Diabetes mellitus     GERD without esophagitis     H/O heart bypass surgery     History of kidney stones     Hypertension     Hypogonadism in male     Migraine headache     VONNIE on CPAP        Past Surgical History:   Procedure Laterality Date    APPENDECTOMY      CERVICAL SPINE SURGERY  11/2020    by Dr. Jeffers     COLONOSCOPY N/A 05/12/2016    Procedure: COLONOSCOPY;  Surgeon: Larry Ortega MD;  Location: [facility] ENDO;  Service: Endoscopy;  Laterality: N/A;    CORONARY ANGIOGRAPHY N/A 06/01/2019    Procedure: ANGIOGRAM, CORONARY ARTERY;  Surgeon: Mando Pickett MD;  Location: [facility] CATH;  Service: Cardiology;  Laterality: N/A;    CORONARY ANGIOPLASTY WITH STENT PLACEMENT      CORONARY ARTERY BYPASS GRAFT  2010    x 6    EYE SURGERY      cataract w/iol right only    GASTRECTOMY      INSERTION OF IMPLANTABLE LOOP RECORDER      JOINT REPLACEMENT      knee replacement Bilateral     LAPAROSCOPIC GASTRIC BANDING  2013    LEFT HEART CATHETERIZATION Left 06/01/2019    Procedure: CATHETERIZATION, HEART, LEFT;  Surgeon: Mando Pickett MD;  Location: [facility] CATH;  Service: Cardiology;  Laterality: Left;    RADIOFREQUENCY ABLATION OF LUMBAR MEDIAL BRANCH NERVE AT SINGLE LEVEL Bilateral 10/11/2022    Procedure: Radiofrequency Ablation, Nerve, Spinal, Lumbar, Medial Branch, L3/4, L4/5, and L5/S1;  Surgeon: Lukas Garcia MD;  Location: [facility];  Service: Pain Management;  Laterality: Bilateral;     REMOVAL OF IMPLANTABLE LOOP RECORDER N/A 09/04/2018    Procedure: REMOVAL, IMPLANTABLE LOOP RECORDER;  Surgeon: Vahe Singh III, MD;  Location: [facility] CATH;  Service: Cardiology;  Laterality: N/A;    STERNUM SEPARATION REPAIR  2011       Social History     Socioeconomic History    Marital status:    Occupational History    Occupation: retired-heavy equipment   Tobacco Use    Smoking status: Never    Smokeless tobacco: Never   Substance and Sexual Activity    Alcohol use: No    Drug use: No    Sexual activity: Yes     Partners: Female   Social History Narrative             Medications/Allergies: See med card    Vitals:    12/16/22 1312   BP: 115/75   Pulse: (!) 59   SpO2: 97%   Weight: 118.9 kg (262 lb 0.3 oz)   Height: 6' (1.829 m)   PainSc:   9   PainLoc: Back     Body mass index is 35.54 kg/m².    Physical exam:  Gen: A and O x3, pleasant, well-groomed  Skin: No rashes or obvious lesions  HEENT: PERRLA, no obvious deformities on ears or in canals. Trachea midline.  CVS: Regular rate and rhythm, normal palpable pulses.  Resp:No increased work of breathing, symmetrical chest rise.  Abdomen: Soft, NT/ND.  Musculoskeletal:  Forward leaning gait          Neuro:    Iliopsoas Quadriceps Knee  Flexion Tibialis  anterior Gastro- cnemius EHL   Lower: R 5/5 5/5 5/5 5/5 5/5 5/5    L 5/5 5/5 5/5 5/5 5/5 5/5      Left  Right    Patellar DTR 0+ 0+   Achilles DTR 0+ 0+                    Intact and symmetrical to light touch and pinprick in L1-S1 dermatomes bilaterally.    Lumbar spine:  Lumbar spine:  Range of motion slightly reduced with forward flexion, severely reduced with extension with increased pain in the back and right buttock.    Jimbo's test causes no increased pain on either side.    Supine straight leg raise is positive for right leg pain.   Internal and external rotation of the hip causes no increased pain on either side.  Myofascial exam:  Mild tenderness to palpation across lumbar paraspinous  muscles.    Imagin21 MRI L-spine:  L2-L3: Bilateral facet degenerative change.   L3-L4: Minimal posterior disc bulge and bilateral facet degenerative change.  Minimal spinal canal stenosis.  Mild right neural foraminal narrowing.   L4-L5: Minimal posterior broad-based disc bulge with superimposed right paracentral disc protrusion.  Protruding segment measures 13 mm TV by 3 mm AP.  There is also disc fissure in the right paracentral region.  Bilateral facet degenerative change.  Mild spinal canal stenosis.  Narrowing of the right lateral recess.  Mild right neural foraminal narrowing.   L5-S1: Mild posterior disc bulge with superimposed left paracentral disc fissure.  Bilateral facet degenerative change moderate right and severe left neural foraminal narrowing.       Assessment:  William Manuel Jr. is a 71 y.o. year old male patient who has a past medical history of Acute GI bleeding, Anticoagulant long-term use, Anxiety, Arthritis, BPH (benign prostatic hyperplasia), Claustrophobia, Coronary artery disease, Diabetes mellitus, GERD without esophagitis, H/O heart bypass surgery, Hypertension, Hypogonadism in male, Migraine headache, and VONNIE on CPAP. He presents in referral from Dr. Zach Almaraz for back and right leg pain.    1. Lumbar radiculopathy  Vital signs    Place 18-22 [facility] peripheral IV     Verify informed consent    Notify physician     Notify physician     Notify physician (specify)    Diet NPO    Case Request Operating Room: Injection-steroid-epidural-lumbar L5/S1 to left    Place in Outpatient    lactated ringers infusion      2. Lumbar spondylosis        3. Spinal stenosis of lumbar region without neurogenic claudication        4. Chronic midline low back pain with right-sided sciatica              Plan:  1. He did not find significant relief of his chronic low back pain with the RFA.  He did find some relief with his right leg pain from the RFA which may have been a result of the  steroids associated with the RFA.  2. Ultimately, despite the resolution of the pain down his right leg he continues to have severe 10/10 lower back pain.  I believe this may be originating from his multilevel disc fissures at L4/5 and L5/S1  3. For his continued pain I would like to schedule him for an L5/S1 interlaminar epidural steroid injection.  We will get clearance from his cardiologist in [place] to hold his antiplatelet and anticoagulation.  4. Follow-up 4 weeks post procedure or sooner if needed.  If he fails to get relief with this will refer him to Neurosurgery.        Thank you for referring this interesting patient, and I look forward to continuing to collaborate in his care.

## 2022-12-28 DIAGNOSIS — E11.9 TYPE 2 DIABETES MELLITUS WITHOUT COMPLICATION, UNSPECIFIED WHETHER LONG TERM INSULIN USE: ICD-10-CM

## 2023-01-03 ENCOUNTER — PATIENT MESSAGE (OUTPATIENT)
Dept: ADMINISTRATIVE | Facility: HOSPITAL | Age: 73
End: 2023-01-03
Payer: MEDICARE

## 2023-01-03 PROBLEM — M54.16 LUMBAR RADICULOPATHY: Status: ACTIVE | Noted: 2023-01-03

## 2023-01-18 ENCOUNTER — PATIENT MESSAGE (OUTPATIENT)
Dept: ADMINISTRATIVE | Facility: HOSPITAL | Age: 73
End: 2023-01-18
Payer: MEDICARE

## 2023-01-26 ENCOUNTER — OFFICE VISIT (OUTPATIENT)
Dept: FAMILY MEDICINE | Facility: CLINIC | Age: 73
End: 2023-01-26
Payer: MEDICARE

## 2023-01-26 ENCOUNTER — LAB VISIT (OUTPATIENT)
Dept: LAB | Facility: HOSPITAL | Age: 73
End: 2023-01-26
Attending: INTERNAL MEDICINE
Payer: MEDICARE

## 2023-01-26 VITALS
BODY MASS INDEX: 35.21 KG/M2 | SYSTOLIC BLOOD PRESSURE: 104 MMHG | DIASTOLIC BLOOD PRESSURE: 66 MMHG | WEIGHT: 260 LBS | OXYGEN SATURATION: 95 % | HEIGHT: 72 IN | HEART RATE: 60 BPM

## 2023-01-26 DIAGNOSIS — M54.41 CHRONIC MIDLINE LOW BACK PAIN WITH RIGHT-SIDED SCIATICA: ICD-10-CM

## 2023-01-26 DIAGNOSIS — E11.59 TYPE 2 DIABETES MELLITUS WITH OTHER CIRCULATORY COMPLICATION, WITHOUT LONG-TERM CURRENT USE OF INSULIN: ICD-10-CM

## 2023-01-26 DIAGNOSIS — E11.65 TYPE 2 DIABETES MELLITUS WITH HYPERGLYCEMIA, WITHOUT LONG-TERM CURRENT USE OF INSULIN: ICD-10-CM

## 2023-01-26 DIAGNOSIS — C44.300 SKIN CANCER OF FACE: ICD-10-CM

## 2023-01-26 DIAGNOSIS — I10 ESSENTIAL HYPERTENSION: Primary | ICD-10-CM

## 2023-01-26 DIAGNOSIS — E66.01 CLASS 2 SEVERE OBESITY DUE TO EXCESS CALORIES WITH SERIOUS COMORBIDITY AND BODY MASS INDEX (BMI) OF 36.0 TO 36.9 IN ADULT: ICD-10-CM

## 2023-01-26 DIAGNOSIS — I25.10 CORONARY ARTERY DISEASE INVOLVING NATIVE CORONARY ARTERY OF NATIVE HEART WITHOUT ANGINA PECTORIS: ICD-10-CM

## 2023-01-26 DIAGNOSIS — I71.20 THORACIC AORTIC ANEURYSM WITHOUT RUPTURE, UNSPECIFIED PART: ICD-10-CM

## 2023-01-26 DIAGNOSIS — G89.29 CHRONIC MIDLINE LOW BACK PAIN WITH RIGHT-SIDED SCIATICA: ICD-10-CM

## 2023-01-26 PROBLEM — I73.9 VASOSPASM: Status: RESOLVED | Noted: 2019-07-18 | Resolved: 2023-01-26

## 2023-01-26 PROBLEM — F11.93 OPIATE WITHDRAWAL: Status: RESOLVED | Noted: 2019-07-18 | Resolved: 2023-01-26

## 2023-01-26 PROCEDURE — 1101F PT FALLS ASSESS-DOCD LE1/YR: CPT | Mod: CPTII,S$GLB,, | Performed by: INTERNAL MEDICINE

## 2023-01-26 PROCEDURE — 1157F ADVNC CARE PLAN IN RCRD: CPT | Mod: CPTII,S$GLB,, | Performed by: INTERNAL MEDICINE

## 2023-01-26 PROCEDURE — 3061F NEG MICROALBUMINURIA REV: CPT | Mod: CPTII,S$GLB,, | Performed by: INTERNAL MEDICINE

## 2023-01-26 PROCEDURE — 3051F HG A1C>EQUAL 7.0%<8.0%: CPT | Mod: CPTII,S$GLB,, | Performed by: INTERNAL MEDICINE

## 2023-01-26 PROCEDURE — 3008F BODY MASS INDEX DOCD: CPT | Mod: CPTII,S$GLB,, | Performed by: INTERNAL MEDICINE

## 2023-01-26 PROCEDURE — 3061F PR NEG MICROALBUMINURIA RESULT DOCUMENTED/REVIEW: ICD-10-PCS | Mod: CPTII,S$GLB,, | Performed by: INTERNAL MEDICINE

## 2023-01-26 PROCEDURE — 90677 PCV20 VACCINE IM: CPT | Mod: S$GLB,,, | Performed by: INTERNAL MEDICINE

## 2023-01-26 PROCEDURE — 1157F PR ADVANCE CARE PLAN OR EQUIV PRESENT IN MEDICAL RECORD: ICD-10-PCS | Mod: CPTII,S$GLB,, | Performed by: INTERNAL MEDICINE

## 2023-01-26 PROCEDURE — 1159F MED LIST DOCD IN RCRD: CPT | Mod: CPTII,S$GLB,, | Performed by: INTERNAL MEDICINE

## 2023-01-26 PROCEDURE — 3074F SYST BP LT 130 MM HG: CPT | Mod: CPTII,S$GLB,, | Performed by: INTERNAL MEDICINE

## 2023-01-26 PROCEDURE — 90677 PNEUMOCOCCAL CONJUGATE VACCINE 20-VALENT: ICD-10-PCS | Mod: S$GLB,,, | Performed by: INTERNAL MEDICINE

## 2023-01-26 PROCEDURE — 99999 PR PBB SHADOW E&M-EST. PATIENT-LVL IV: ICD-10-PCS | Mod: PBBFAC,,, | Performed by: INTERNAL MEDICINE

## 2023-01-26 PROCEDURE — 99214 OFFICE O/P EST MOD 30 MIN: CPT | Mod: S$GLB,,, | Performed by: INTERNAL MEDICINE

## 2023-01-26 PROCEDURE — 3008F PR BODY MASS INDEX (BMI) DOCUMENTED: ICD-10-PCS | Mod: CPTII,S$GLB,, | Performed by: INTERNAL MEDICINE

## 2023-01-26 PROCEDURE — 3288F PR FALLS RISK ASSESSMENT DOCUMENTED: ICD-10-PCS | Mod: CPTII,S$GLB,, | Performed by: INTERNAL MEDICINE

## 2023-01-26 PROCEDURE — 1160F RVW MEDS BY RX/DR IN RCRD: CPT | Mod: CPTII,S$GLB,, | Performed by: INTERNAL MEDICINE

## 2023-01-26 PROCEDURE — 3074F PR MOST RECENT SYSTOLIC BLOOD PRESSURE < 130 MM HG: ICD-10-PCS | Mod: CPTII,S$GLB,, | Performed by: INTERNAL MEDICINE

## 2023-01-26 PROCEDURE — 1160F PR REVIEW ALL MEDS BY PRESCRIBER/CLIN PHARMACIST DOCUMENTED: ICD-10-PCS | Mod: CPTII,S$GLB,, | Performed by: INTERNAL MEDICINE

## 2023-01-26 PROCEDURE — 82570 ASSAY OF URINE CREATININE: CPT | Performed by: INTERNAL MEDICINE

## 2023-01-26 PROCEDURE — 1159F PR MEDICATION LIST DOCUMENTED IN MEDICAL RECORD: ICD-10-PCS | Mod: CPTII,S$GLB,, | Performed by: INTERNAL MEDICINE

## 2023-01-26 PROCEDURE — G0009 ADMIN PNEUMOCOCCAL VACCINE: HCPCS | Mod: S$GLB,,, | Performed by: INTERNAL MEDICINE

## 2023-01-26 PROCEDURE — G0009 PNEUMOCOCCAL CONJUGATE VACCINE 20-VALENT: ICD-10-PCS | Mod: S$GLB,,, | Performed by: INTERNAL MEDICINE

## 2023-01-26 PROCEDURE — 99214 PR OFFICE/OUTPT VISIT, EST, LEVL IV, 30-39 MIN: ICD-10-PCS | Mod: S$GLB,,, | Performed by: INTERNAL MEDICINE

## 2023-01-26 PROCEDURE — 3078F PR MOST RECENT DIASTOLIC BLOOD PRESSURE < 80 MM HG: ICD-10-PCS | Mod: CPTII,S$GLB,, | Performed by: INTERNAL MEDICINE

## 2023-01-26 PROCEDURE — 3288F FALL RISK ASSESSMENT DOCD: CPT | Mod: CPTII,S$GLB,, | Performed by: INTERNAL MEDICINE

## 2023-01-26 PROCEDURE — 3051F PR MOST RECENT HEMOGLOBIN A1C LEVEL 7.0 - < 8.0%: ICD-10-PCS | Mod: CPTII,S$GLB,, | Performed by: INTERNAL MEDICINE

## 2023-01-26 PROCEDURE — 3078F DIAST BP <80 MM HG: CPT | Mod: CPTII,S$GLB,, | Performed by: INTERNAL MEDICINE

## 2023-01-26 PROCEDURE — 3066F PR DOCUMENTATION OF TREATMENT FOR NEPHROPATHY: ICD-10-PCS | Mod: CPTII,S$GLB,, | Performed by: INTERNAL MEDICINE

## 2023-01-26 PROCEDURE — 3066F NEPHROPATHY DOC TX: CPT | Mod: CPTII,S$GLB,, | Performed by: INTERNAL MEDICINE

## 2023-01-26 PROCEDURE — 1101F PR PT FALLS ASSESS DOC 0-1 FALLS W/OUT INJ PAST YR: ICD-10-PCS | Mod: CPTII,S$GLB,, | Performed by: INTERNAL MEDICINE

## 2023-01-26 PROCEDURE — 99999 PR PBB SHADOW E&M-EST. PATIENT-LVL IV: CPT | Mod: PBBFAC,,, | Performed by: INTERNAL MEDICINE

## 2023-01-26 RX ORDER — OXYCODONE AND ACETAMINOPHEN 10; 325 MG/1; MG/1
1.5 TABLET ORAL EVERY 6 HOURS PRN
Qty: 180 TABLET | Refills: 0 | Status: SHIPPED | OUTPATIENT
Start: 2023-01-26 | End: 2023-03-02 | Stop reason: SDUPTHER

## 2023-01-26 NOTE — PROGRESS NOTES
Patient ID: William Manuel .     Chief Complaint:   Chief Complaint   Patient presents with    4 month follow up        HPI:  Routine follow-up and unfortunately things really have not improved with his chronic back pain despite having steroid injection and a rhizotomy.  He still is in a great deal of pain and also still complains of right shoulder pain radiating down to his right elbow.  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  He does have a follow-up appointment with Dr. Briones and that unfortunately was postponed till the beginning part of February to discuss other treatment options.  For now I will continue his Percocet as this is the only thing that can help temporize the pain.  He is due for routine labs which we will get on the way out today.  Vital signs do look good.  I do want to give him a Prevnar 20 to complete his pneumonia shot vaccination series. He has a suspected skin cancer on his Right cheek that he can't get biopsied due to blood thinners, but he will need to see Derm again.     Review of Systems   Constitutional: Negative.    HENT: Negative.     Eyes: Negative.    Respiratory: Negative.     Cardiovascular: Negative.    Gastrointestinal: Negative.    Endocrine: Negative.    Genitourinary: Negative.    Musculoskeletal:  Positive for back pain and gait problem.   Skin: Negative.    Allergic/Immunologic: Negative.    Hematological: Negative.    Psychiatric/Behavioral: Negative.          Objective:      Physical Exam   Physical Exam  Vitals and nursing note reviewed.   Constitutional:       Appearance: Normal appearance. He is well-developed. He is obese.   HENT:      Head: Normocephalic and atraumatic.      Nose: Nose normal.      Mouth/Throat:      Mouth: Mucous membranes are moist.   Eyes:      Extraocular Movements: Extraocular  movements intact.      Conjunctiva/sclera: Conjunctivae normal.      Pupils: Pupils are equal, round, and reactive to light.   Cardiovascular:      Rate and Rhythm: Normal rate and regular rhythm.      Pulses: Normal pulses.      Heart sounds: Normal heart sounds.   Pulmonary:      Effort: Pulmonary effort is normal.      Breath sounds: Normal breath sounds.   Abdominal:      General: Bowel sounds are normal.      Palpations: Abdomen is soft.   Musculoskeletal:         General: Normal range of motion.      Cervical back: Normal range of motion and neck supple.   Skin:     General: Skin is warm and dry.      Capillary Refill: Capillary refill takes less than 2 seconds.      Findings: Lesion present.      Comments: Crusted lesion on Right cheek   Neurological:      General: No focal deficit present.      Mental Status: He is alert and oriented to person, place, and time.   Psychiatric:         Mood and Affect: Mood normal.         Behavior: Behavior normal.         Thought Content: Thought content normal.         Judgment: Judgment normal.          Vitals:   Vitals:    01/26/23 1504   BP: 104/66   Pulse: 60   SpO2: 95%   Weight: 117.9 kg (260 lb)   Height: 6' (1.829 m)          Current Outpatient Medications:     amLODIPine-benazepriL (LOTREL) 10-40 mg per capsule, TAKE 1 CAPSULE DAILY, Disp: 90 capsule, Rfl: 0    aspirin (ECOTRIN) 81 MG EC tablet, Take 81 mg by mouth once daily., Disp: , Rfl:     blood sugar diagnostic Strp, To check BG 2 times daily, to use with insurance preferred meter, Disp: 200 strip, Rfl: 12    clonazePAM (KLONOPIN) 0.5 MG tablet, TAKE 1 TABLET TWICE A DAY, Disp: 180 tablet, Rfl: 0    etodolac (LODINE) 400 MG tablet, TAKE 1 TABLET BY MOUTH ONCE DAILY UP TO THREE TIMES DAILY, Disp: 270 tablet, Rfl: 3    ezetimibe (ZETIA) 10 mg tablet, Take 10 mg by mouth once daily., Disp: , Rfl:     furosemide (LASIX) 40 MG tablet, TAKE 1 TABLET DAILY, Disp: 90 tablet, Rfl: 3    glipiZIDE (GLUCOTROL) 5 MG  tablet, TAKE 1 TABLET TWICE A DAY BEFORE MEALS (Patient taking differently: Take 5 mg by mouth 2 (two) times daily with meals.), Disp: 180 tablet, Rfl: 1    lancets Misc, To check BG 2 times daily, to use with insurance preferred meter, Disp: 200 each, Rfl: 12    metoprolol succinate (TOPROL-XL) 100 MG 24 hr tablet, TAKE 1 TABLET DAILY (Patient taking differently: 100 mg once daily.), Disp: 90 tablet, Rfl: 3    ondansetron (ZOFRAN-ODT) 8 MG TbDL, Take 8 mg by mouth every 6 (six) hours as needed., Disp: , Rfl:     promethazine (PHENERGAN) 25 MG tablet, Take 1 tablet (25 mg total) by mouth every 6 (six) hours as needed for Nausea., Disp: 20 tablet, Rfl: 0    rivaroxaban (XARELTO ORAL), Take 1 tablet by mouth Daily., Disp: , Rfl:     rosuvastatin (CRESTOR) 20 MG tablet, Take 20 mg by mouth once daily., Disp: , Rfl:     tamsulosin (FLOMAX) 0.4 mg Cap, TAKE 1 CAPSULE DAILY, Disp: 90 capsule, Rfl: 3    zolpidem (AMBIEN) 10 mg Tab, TAKE 1 TABLET(10 MG) BY MOUTH EVERY NIGHT AS NEEDED FOR INSOMNIA, Disp: 30 tablet, Rfl: 5    blood-glucose meter kit, To check BG 2 times daily, to use with insurance preferred meter, Disp: 1 each, Rfl: 0    oxyCODONE-acetaminophen (PERCOCET)  mg per tablet, Take 1.5 tablets by mouth every 6 (six) hours as needed for Pain., Disp: 180 tablet, Rfl: 0   Assessment:       Patient Active Problem List    Diagnosis Date Noted    Lumbar radiculopathy 01/03/2023    Lumbar spondylosis 10/11/2022    Thoracic aortic aneurysm without rupture 01/21/2022    History of pulmonary embolism 10/21/2021    Cough 10/21/2021    Chronic midline low back pain with right-sided sciatica 10/21/2021    Lumbar degenerative disc disease 03/22/2021    Sciatic nerve pain, right 03/22/2021    Insomnia due to medical condition 07/30/2020    Fatigue 07/30/2020    Other headache syndrome 07/07/2020    Type 2 diabetes mellitus with hyperglycemia, without long-term current use of insulin     Morbid (severe) obesity due to  excess calories 07/18/2019    Class 2 obesity due to excess calories with body mass index (BMI) of 36.0 to 36.9 in adult 07/18/2019    VONNIE on CPAP     GERD without esophagitis     BPH (benign prostatic hyperplasia)     Anxiety     Unstable angina 05/31/2019    Renal insufficiency 05/31/2019    Benign prostatic hyperplasia with incomplete bladder emptying 03/13/2018    Acute urinary retention 03/13/2018    Right lower quadrant abdominal pain 03/11/2018    Flank pain, acute 03/11/2018    Migraine headache     H/O heart bypass surgery     Claustrophobia     Anticoagulant long-term use     Spondylosis without myelopathy or radiculopathy, cervical region 05/23/2016    Essential hypertension 05/11/2016    Coronary artery disease involving native coronary artery of native heart without angina pectoris 05/11/2016    Polycythemia 05/11/2016    Acute lower GI bleeding 05/11/2016    Dyslipidemia 05/11/2016    Gastroesophageal reflux disease without esophagitis 05/11/2016    Hypogonadism in male 05/11/2016    Migraine without aura and without status migrainosus, not intractable 05/11/2016    Primary osteoarthritis involving multiple joints 05/11/2016          Plan:       William Manuel Jr.  was seen today for follow-up and may need lab work.    Diagnoses and all orders for this visit:    William was seen today for 4 month follow up.    Diagnoses and all orders for this visit:    Essential hypertension  Controlled with med     Chronic midline low back pain with right-sided sciatica  -     oxyCODONE-acetaminophen (PERCOCET)  mg per tablet; Take 1.5 tablets by mouth every 6 (six) hours as needed for Pain.  Refilling   Follow up with Pain Management     Class 2 severe obesity due to excess calories with serious comorbidity and body mass index (BMI) of 36.0 to 36.9 in adult  Monitor     Thoracic aortic aneurysm without rupture, unspecified part  Get Echo     Type 2 diabetes mellitus with hyperglycemia, without long-term  current use of insulin  Check labs      Coronary artery disease involving native coronary artery of native heart without angina pectoris  Stable     Skin cancer of face  -     Ambulatory referral/consult to Dermatology; Future    Other orders  -     (In Office Administered) Pneumococcal Conjugate Vaccine (20 Valent) (IM)

## 2023-01-26 NOTE — LETTER
FAX      AUTHORIZATION FOR RELEASE OF   CONFIDENTIAL INFORMATION        Dear Dr Sahni,      We are seeing William Manuel Jr., date of birth 1950, in the clinic at [facility]. Zach Almaraz MD is the patient's PCP. William Manuel Jr. has an outstanding lab/procedure at the time we reviewed their chart. In order to help keep their health information updated, William Mondragonsusanne Rizvi has authorized us to request the following medical record(s):     ()  MAMMOGRAM (WITHIN 2 YRS)  ()  COLON/PATH W/RECALL (WITHIN 10 YRS)     ()  PAP SMEAR (WITHIN 3 YRS)      ()  OUTSIDE LAB RESULTS    ()  DEXA SCAN (WITHIN 3 YRS)      () DM EYE EXAM (WITHIN 48 MONTHS)          ()  FOOT EXAM (WITHIN 1yr.)          () OUTSIDE IMMUNIZATIONS    (x)  OTHER (ECHO)                                  Please fax records to [facility] [phone].     If you have any questions, please contact Wander at [phone]                Patient Name: William Manuel Jr.  : 1950  Patient Phone #: [phone]

## 2023-01-27 ENCOUNTER — TELEPHONE (OUTPATIENT)
Dept: FAMILY MEDICINE | Facility: CLINIC | Age: 73
End: 2023-01-27
Payer: MEDICARE

## 2023-01-27 DIAGNOSIS — E11.59 TYPE 2 DIABETES MELLITUS WITH OTHER CIRCULATORY COMPLICATION, WITHOUT LONG-TERM CURRENT USE OF INSULIN: Primary | ICD-10-CM

## 2023-01-27 LAB
ALBUMIN/CREAT UR: NORMAL UG/MG (ref 0–30)
CREAT UR-MCNC: 95 MG/DL (ref 23–375)
MICROALBUMIN UR DL<=1MG/L-MCNC: <5 UG/ML

## 2023-01-27 NOTE — TELEPHONE ENCOUNTER
----- Message from Jenae Whittington sent at 1/27/2023  4:09 AM CST -----  Regarding: Lab Client Services  Contact: [phone]  Good Morning,     My name is Jenae Whittington I work in the Lab Client Services. We had a problem with some lab work on this patient. If someone from your office could call us at [phone] or [phone] that would be great. Anyone in my department can help.      Thank you

## 2023-01-28 NOTE — TELEPHONE ENCOUNTER
Spoke with pt re: needing to repeat some labs due to hemolyzed.   Pt verbalized understanding .  Make repeat lab appt with pt & ordered labs & linked to appt

## 2023-01-30 ENCOUNTER — TELEPHONE (OUTPATIENT)
Dept: FAMILY MEDICINE | Facility: CLINIC | Age: 73
End: 2023-01-30
Payer: MEDICARE

## 2023-01-30 NOTE — TELEPHONE ENCOUNTER
Spoke to pt's wife Sabra per pt to advise of repeat lab of CMP and lipid needed due to a tube was mishandled in the lab. randy Correa verified for labs on 2/3/23.

## 2023-01-30 NOTE — TELEPHONE ENCOUNTER
----- Message from Melinda Huff sent at 1/30/2023 12:07 PM CST -----  Regarding: advise  Contact: Patient  Type: Needs Medical Advice  Who Called:  Patient  Symptoms (please be specific):    How long has patient had these symptoms:    Pharmacy name and phone #:    Best Call Back Number:  [phone]  Additional Information: Patient needs to know if he needs to redo his lab or are they ok. Please call wife to advise. Thanks!

## 2023-02-03 ENCOUNTER — LAB VISIT (OUTPATIENT)
Dept: LAB | Facility: HOSPITAL | Age: 73
End: 2023-02-03
Attending: INTERNAL MEDICINE
Payer: MEDICARE

## 2023-02-03 DIAGNOSIS — E11.59 TYPE 2 DIABETES MELLITUS WITH OTHER CIRCULATORY COMPLICATION, WITHOUT LONG-TERM CURRENT USE OF INSULIN: ICD-10-CM

## 2023-02-03 LAB
ALBUMIN SERPL BCP-MCNC: 3.7 G/DL (ref 3.5–5.2)
ALP SERPL-CCNC: 52 U/L (ref 55–135)
ALT SERPL W/O P-5'-P-CCNC: 12 U/L (ref 10–44)
ANION GAP SERPL CALC-SCNC: 11 MMOL/L (ref 8–16)
AST SERPL-CCNC: 17 U/L (ref 10–40)
BILIRUB SERPL-MCNC: 0.6 MG/DL (ref 0.1–1)
BUN SERPL-MCNC: 14 MG/DL (ref 8–23)
CALCIUM SERPL-MCNC: 9.2 MG/DL (ref 8.7–10.5)
CHLORIDE SERPL-SCNC: 105 MMOL/L (ref 95–110)
CHOLEST SERPL-MCNC: 124 MG/DL (ref 120–199)
CHOLEST/HDLC SERPL: 2.4 {RATIO} (ref 2–5)
CO2 SERPL-SCNC: 25 MMOL/L (ref 23–29)
CREAT SERPL-MCNC: 1.2 MG/DL (ref 0.5–1.4)
EST. GFR  (NO RACE VARIABLE): >60 ML/MIN/1.73 M^2
GLUCOSE SERPL-MCNC: 118 MG/DL (ref 70–110)
HDLC SERPL-MCNC: 52 MG/DL (ref 40–75)
HDLC SERPL: 41.9 % (ref 20–50)
LDLC SERPL CALC-MCNC: 51.6 MG/DL (ref 63–159)
NONHDLC SERPL-MCNC: 72 MG/DL
POTASSIUM SERPL-SCNC: 4.7 MMOL/L (ref 3.5–5.1)
PROT SERPL-MCNC: 6.9 G/DL (ref 6–8.4)
SODIUM SERPL-SCNC: 141 MMOL/L (ref 136–145)
TRIGL SERPL-MCNC: 102 MG/DL (ref 30–150)

## 2023-02-03 PROCEDURE — 80053 COMPREHEN METABOLIC PANEL: CPT | Performed by: INTERNAL MEDICINE

## 2023-02-03 PROCEDURE — 36415 COLL VENOUS BLD VENIPUNCTURE: CPT | Mod: PO | Performed by: INTERNAL MEDICINE

## 2023-02-03 PROCEDURE — 80061 LIPID PANEL: CPT | Performed by: INTERNAL MEDICINE

## 2023-02-08 ENCOUNTER — OFFICE VISIT (OUTPATIENT)
Dept: PAIN MEDICINE | Facility: CLINIC | Age: 73
End: 2023-02-08
Payer: MEDICARE

## 2023-02-08 VITALS — BODY MASS INDEX: 35.78 KG/M2 | HEIGHT: 72 IN | WEIGHT: 264.13 LBS

## 2023-02-08 DIAGNOSIS — M54.16 LUMBAR RADICULOPATHY: Primary | ICD-10-CM

## 2023-02-08 DIAGNOSIS — M54.16 LUMBAR RADICULOPATHY, CHRONIC: ICD-10-CM

## 2023-02-08 DIAGNOSIS — M47.816 LUMBAR SPONDYLOSIS: ICD-10-CM

## 2023-02-08 DIAGNOSIS — M54.9 DORSALGIA, UNSPECIFIED: ICD-10-CM

## 2023-02-08 PROCEDURE — 3051F HG A1C>EQUAL 7.0%<8.0%: CPT | Mod: CPTII,S$GLB,,

## 2023-02-08 PROCEDURE — 1157F ADVNC CARE PLAN IN RCRD: CPT | Mod: CPTII,S$GLB,,

## 2023-02-08 PROCEDURE — 3288F FALL RISK ASSESSMENT DOCD: CPT | Mod: CPTII,S$GLB,,

## 2023-02-08 PROCEDURE — 3066F NEPHROPATHY DOC TX: CPT | Mod: CPTII,S$GLB,,

## 2023-02-08 PROCEDURE — 3008F PR BODY MASS INDEX (BMI) DOCUMENTED: ICD-10-PCS | Mod: CPTII,S$GLB,,

## 2023-02-08 PROCEDURE — 3051F PR MOST RECENT HEMOGLOBIN A1C LEVEL 7.0 - < 8.0%: ICD-10-PCS | Mod: CPTII,S$GLB,,

## 2023-02-08 PROCEDURE — 3008F BODY MASS INDEX DOCD: CPT | Mod: CPTII,S$GLB,,

## 2023-02-08 PROCEDURE — 1125F PR PAIN SEVERITY QUANTIFIED, PAIN PRESENT: ICD-10-PCS | Mod: CPTII,S$GLB,,

## 2023-02-08 PROCEDURE — 99213 OFFICE O/P EST LOW 20 MIN: CPT | Mod: S$GLB,,,

## 2023-02-08 PROCEDURE — 1157F PR ADVANCE CARE PLAN OR EQUIV PRESENT IN MEDICAL RECORD: ICD-10-PCS | Mod: CPTII,S$GLB,,

## 2023-02-08 PROCEDURE — 99999 PR PBB SHADOW E&M-EST. PATIENT-LVL III: ICD-10-PCS | Mod: PBBFAC,,,

## 2023-02-08 PROCEDURE — 3061F PR NEG MICROALBUMINURIA RESULT DOCUMENTED/REVIEW: ICD-10-PCS | Mod: CPTII,S$GLB,,

## 2023-02-08 PROCEDURE — 3288F PR FALLS RISK ASSESSMENT DOCUMENTED: ICD-10-PCS | Mod: CPTII,S$GLB,,

## 2023-02-08 PROCEDURE — 3066F PR DOCUMENTATION OF TREATMENT FOR NEPHROPATHY: ICD-10-PCS | Mod: CPTII,S$GLB,,

## 2023-02-08 PROCEDURE — 1125F AMNT PAIN NOTED PAIN PRSNT: CPT | Mod: CPTII,S$GLB,,

## 2023-02-08 PROCEDURE — 99999 PR PBB SHADOW E&M-EST. PATIENT-LVL III: CPT | Mod: PBBFAC,,,

## 2023-02-08 PROCEDURE — 1159F MED LIST DOCD IN RCRD: CPT | Mod: CPTII,S$GLB,,

## 2023-02-08 PROCEDURE — 1101F PR PT FALLS ASSESS DOC 0-1 FALLS W/OUT INJ PAST YR: ICD-10-PCS | Mod: CPTII,S$GLB,,

## 2023-02-08 PROCEDURE — 1101F PT FALLS ASSESS-DOCD LE1/YR: CPT | Mod: CPTII,S$GLB,,

## 2023-02-08 PROCEDURE — 99213 PR OFFICE/OUTPT VISIT, EST, LEVL III, 20-29 MIN: ICD-10-PCS | Mod: S$GLB,,,

## 2023-02-08 PROCEDURE — 1159F PR MEDICATION LIST DOCUMENTED IN MEDICAL RECORD: ICD-10-PCS | Mod: CPTII,S$GLB,,

## 2023-02-08 PROCEDURE — 3061F NEG MICROALBUMINURIA REV: CPT | Mod: CPTII,S$GLB,,

## 2023-02-08 NOTE — PROGRESS NOTES
This note was completed with dictation software and grammatical errors may exist.    Chief Complaint   Patient presents with    Follow-up    Back Pain        HPI: William Manuel Jr. is a 71 y.o. year old male patient who has a past medical history of Acute GI bleeding, Anticoagulant long-term use, Anxiety, Arthritis, BPH (benign prostatic hyperplasia), Claustrophobia, Coronary artery disease, Diabetes mellitus, GERD without esophagitis, H/O heart bypass surgery, History of kidney stones, Hypertension, Hypogonadism in male, Migraine headache, and VONNIE on CPAP. He presents in referral from No ref. provider found for back and right leg pain. He is status post L5/S1 ELISA to left on 01/03/2023 with 25% relief for few days and now 0%.  Today he states his remaining pain is 8/10, constant, with radiation down right leg.  He states majority of his pain is located across his lower back.  He denies any associated numbness, weakness or any new changes to his bowel bladder function.        Prior HPI: William Manuel Jr. is a 72 y.o. year old male patient who has a past medical history of Acute GI bleeding, Anticoagulant long-term use, Anxiety, Arthritis, BPH (benign prostatic hyperplasia), Claustrophobia, Coronary artery disease, Diabetes mellitus, GERD without esophagitis, H/O heart bypass surgery, History of kidney stones, Hypertension, Hypogonadism in male, Migraine headache, and VONNIE on CPAP. He presents in referral from No ref. provider found for back and right leg pain.  The patient reports that over the last year he has had worsening back pain with pain radiating into his right leg to the calf and foot.  It is worse with standing, walking, worse with doing any lifting, he gets some relief with medications.  He describes the pain as sharp and shooting.  He would undergone cardiac stent about 1 year ago and since that time has been on Effient and Xarelto, could not have any spine interventions since that time.  He  reports that he has previously seen Dr. Jeffers, spine surgeon for his back but apparently had such severe cervical stenosis, he required surgery at C3 through C7 prior to any consideration for the lumbar spine.  Set after his cervical surgery, he has done well in respect to his neck and arms, balance but he also admits that he did not notice the symptoms from his cervical spine previously.  What bothers him the most is his constant low back pain and right leg pain.  The worse seems to be his back pain, this is provoked with standing and walking, somewhat improved with sitting down.  However he still even has pain with sitting.  He denies any yuliana weakness in the legs, bowel or bladder incontinence.  He believes that he had several block procedures in terms of medial branch blocks for his low back in the past but had not proceeded with radiofrequency ablation.  He states that he had 3 injections for blocks but that does not seem to make sense.        Pain intervention history: Previous cervical injections.  He previously had an injection in his lumbar spine in May, 2021, possibly with some relief.  He states that he had 3 injections for blocks but that does not seem to make sense, I suspect 1 of these may have been an epidural steroid injection. He is status post bilateral L3/4, L4/5 and L5/S1 radiofrequency ablation on 10/11/2022 with 0% relief. He is status post L5/S1 ELISA to left on 01/03/2023 with 25% relief for few days and now 0%.    Spine surgeries:  He underwent cervical fusion, C4-6 corpectomy C3-7 anterior fusion, with Dr. Jeffers in 11/2020.     Antineuropathics:  NSAIDs:    Physical therapy:  He underwent physical therapy this last year in  Antidepressants:  Muscle relaxers:  Opioids:  2 tablets of 10/325 Percocet 4 times daily  Antiplatelets/Anticoagulants:  Effient 10 mg, Xarelto        ROS:  He reports back pain only.  Balance of review of systems is negative.    Lab Results   Component Value Date     HGBA1C 7.0 (H) 01/26/2023       Lab Results   Component Value Date    WBC 8.28 01/26/2023    HGB 14.6 01/26/2023    HCT 42.9 01/26/2023    MCV 92 01/26/2023     01/26/2023             Past Medical History:   Diagnosis Date    Acute GI bleeding 2016    Anticoagulant long-term use     Anxiety     Arthritis     BPH (benign prostatic hyperplasia)     Claustrophobia     Coronary artery disease     Diabetes mellitus     GERD without esophagitis     H/O heart bypass surgery     History of kidney stones     Hypertension     Hypogonadism in male     Migraine headache     VONNIE on CPAP        Past Surgical History:   Procedure Laterality Date    APPENDECTOMY      CERVICAL SPINE SURGERY  11/2020    by Dr. Jeffers     COLONOSCOPY N/A 05/12/2016    Procedure: COLONOSCOPY;  Surgeon: Larry Ortega MD;  Location: [facility] ENDO;  Service: Endoscopy;  Laterality: N/A;    CORONARY ANGIOGRAPHY N/A 06/01/2019    Procedure: ANGIOGRAM, CORONARY ARTERY;  Surgeon: Mando Pickett MD;  Location: [facility] CATH;  Service: Cardiology;  Laterality: N/A;    CORONARY ANGIOPLASTY WITH STENT PLACEMENT      CORONARY ARTERY BYPASS GRAFT  2010    x 6    EPIDURAL STEROID INJECTION INTO LUMBAR SPINE N/A 1/3/2023    Procedure: Injection-steroid-epidural-lumbar L5/S1 to left;  Surgeon: Lukas Garcia MD;  Location: [facility];  Service: Pain Management;  Laterality: N/A;    EYE SURGERY      cataract w/iol right only    GASTRECTOMY      INSERTION OF IMPLANTABLE LOOP RECORDER      JOINT REPLACEMENT      knee replacement Bilateral     LAPAROSCOPIC GASTRIC BANDING  2013    LEFT HEART CATHETERIZATION Left 06/01/2019    Procedure: CATHETERIZATION, HEART, LEFT;  Surgeon: Mando Pickett MD;  Location: [facility] CATH;  Service: Cardiology;  Laterality: Left;    RADIOFREQUENCY ABLATION OF LUMBAR MEDIAL BRANCH NERVE AT SINGLE LEVEL Bilateral 10/11/2022    Procedure: Radiofrequency Ablation, Nerve, Spinal, Lumbar, Medial Branch, L3/4, L4/5, and L5/S1;  Surgeon:  Lukas Garcia MD;  Location: [facility] CSC;  Service: Pain Management;  Laterality: Bilateral;    REMOVAL OF IMPLANTABLE LOOP RECORDER N/A 09/04/2018    Procedure: REMOVAL, IMPLANTABLE LOOP RECORDER;  Surgeon: Vahe Singh III, MD;  Location: [facility] CATH;  Service: Cardiology;  Laterality: N/A;    STERNUM SEPARATION REPAIR  2011       Social History     Socioeconomic History    Marital status:    Occupational History    Occupation: retired-heavy equipment   Tobacco Use    Smoking status: Never    Smokeless tobacco: Never   Substance and Sexual Activity    Alcohol use: Not Currently    Drug use: No    Sexual activity: Yes     Partners: Female   Social History Narrative             Medications/Allergies: See med card    Vitals:    02/08/23 1520   Weight: 119.8 kg (264 lb 1.8 oz)   Height: 6' (1.829 m)   PainSc:   8   PainLoc: Back     Body mass index is 35.82 kg/m².    Physical exam:  Gen: A and O x3, pleasant, well-groomed  Skin: No rashes or obvious lesions  HEENT: PERRLA, no obvious deformities on ears or in canals. Trachea midline.  CVS: Regular rate and rhythm, normal palpable pulses.  Resp:No increased work of breathing, symmetrical chest rise.  Abdomen: Soft, NT/ND.  Musculoskeletal:  Forward leaning gait , slow, steady      Neuro:    Iliopsoas Quadriceps Knee  Flexion Tibialis  anterior Gastro- cnemius EHL   Lower: R 5/5 5/5 5/5 5/5 5/5 5/5    L 5/5 5/5 5/5 5/5 5/5 5/5      Left  Right    Patellar DTR 0+ 2+   Achilles DTR 0+ 2+                    Intact and symmetrical to light touch and pinprick in L1-S1 dermatomes bilaterally.    Lumbar spine:  Lumbar spine:  Range of motion significantly reduced with forward flexion, severely reduced with extension with increased pain in the back and right buttock.    Jimbo's test causes no increased pain on either side.    Supine straight leg raise is positive for right leg pain.   Internal and external rotation of the hip causes no increased pain on either  side.  Myofascial exam:  Mild tenderness to palpation across lumbar paraspinous muscles.    Imagin21 MRI L-spine:  L2-L3: Bilateral facet degenerative change.   L3-L4: Minimal posterior disc bulge and bilateral facet degenerative change.  Minimal spinal canal stenosis.  Mild right neural foraminal narrowing.   L4-L5: Minimal posterior broad-based disc bulge with superimposed right paracentral disc protrusion.  Protruding segment measures 13 mm TV by 3 mm AP.  There is also disc fissure in the right paracentral region.  Bilateral facet degenerative change.  Mild spinal canal stenosis.  Narrowing of the right lateral recess.  Mild right neural foraminal narrowing.   L5-S1: Mild posterior disc bulge with superimposed left paracentral disc fissure.  Bilateral facet degenerative change moderate right and severe left neural foraminal narrowing.       Assessment:  William Manuel Jr. is a 71 y.o. year old male patient who has a past medical history of Acute GI bleeding, Anticoagulant long-term use, Anxiety, Arthritis, BPH (benign prostatic hyperplasia), Claustrophobia, Coronary artery disease, Diabetes mellitus, GERD without esophagitis, H/O heart bypass surgery, Hypertension, Hypogonadism in male, Migraine headache, and VONNIE on CPAP. He presents in referral from Dr. Zach Almaraz for back and right leg pain.    1. Lumbar radiculopathy  MRI Lumbar Spine Without Contrast      2. Dorsalgia, unspecified  MRI Lumbar Spine Without Contrast      3. Lumbar radiculopathy, chronic  MRI Lumbar Spine Without Contrast      4. Lumbar spondylosis                Plan:  1. Today we discussed his pain and symptoms.  Unfortunately he did not find significant relief from most recent L5/S1 ELISA.  2. At this time without significant relief from RFA in lumbar ELISA we will refer him to Neurosurgery for further evaluation.  3. I ordered updated lumbar MRI today to further evaluate for any significant changes to his neuro anatomy.  He  would like to be scheduled at [facility] with IV sedation as he gets claustrophobia with MRI.  4. Follow-up after evaluation by Neurosurgery.  He has been evaluated by Dr. James in the past.  If he is not a surgical candidate he may be a potential spinal cord stimulator candidate.  Discussed this with them today.        Thank you for referring this interesting patient, and I look forward to continuing to collaborate in his care.

## 2023-04-11 ENCOUNTER — PATIENT MESSAGE (OUTPATIENT)
Dept: ADMINISTRATIVE | Facility: HOSPITAL | Age: 73
End: 2023-04-11
Payer: MEDICARE

## 2023-04-25 ENCOUNTER — OFFICE VISIT (OUTPATIENT)
Dept: PAIN MEDICINE | Facility: CLINIC | Age: 73
End: 2023-04-25
Payer: MEDICARE

## 2023-04-25 ENCOUNTER — HOSPITAL ENCOUNTER (OUTPATIENT)
Dept: RADIOLOGY | Facility: HOSPITAL | Age: 73
Discharge: HOME | End: 2023-04-25
Payer: MEDICARE

## 2023-04-25 ENCOUNTER — TELEPHONE (OUTPATIENT)
Dept: NEUROSURGERY | Facility: CLINIC | Age: 73
End: 2023-04-25
Payer: MEDICARE

## 2023-04-25 VITALS
HEART RATE: 59 BPM | WEIGHT: 257.5 LBS | BODY MASS INDEX: 34.88 KG/M2 | SYSTOLIC BLOOD PRESSURE: 128 MMHG | DIASTOLIC BLOOD PRESSURE: 73 MMHG | HEIGHT: 72 IN

## 2023-04-25 DIAGNOSIS — M54.2 CERVICALGIA: ICD-10-CM

## 2023-04-25 DIAGNOSIS — M54.16 LUMBAR RADICULOPATHY: ICD-10-CM

## 2023-04-25 DIAGNOSIS — M54.9 DORSALGIA, UNSPECIFIED: ICD-10-CM

## 2023-04-25 DIAGNOSIS — M54.12 CERVICAL RADICULOPATHY: Primary | ICD-10-CM

## 2023-04-25 DIAGNOSIS — Z98.1 S/P CERVICAL SPINAL FUSION: ICD-10-CM

## 2023-04-25 DIAGNOSIS — M47.816 LUMBAR SPONDYLOSIS: ICD-10-CM

## 2023-04-25 PROCEDURE — 72052 X-RAY EXAM NECK SPINE 6/>VWS: CPT | Mod: TC,PO

## 2023-04-25 PROCEDURE — 3074F SYST BP LT 130 MM HG: CPT | Mod: CPTII,S$GLB,,

## 2023-04-25 PROCEDURE — 3066F PR DOCUMENTATION OF TREATMENT FOR NEPHROPATHY: ICD-10-PCS | Mod: CPTII,S$GLB,,

## 2023-04-25 PROCEDURE — 3061F PR NEG MICROALBUMINURIA RESULT DOCUMENTED/REVIEW: ICD-10-PCS | Mod: CPTII,S$GLB,,

## 2023-04-25 PROCEDURE — 3051F PR MOST RECENT HEMOGLOBIN A1C LEVEL 7.0 - < 8.0%: ICD-10-PCS | Mod: CPTII,S$GLB,,

## 2023-04-25 PROCEDURE — 99999 PR PBB SHADOW E&M-EST. PATIENT-LVL IV: ICD-10-PCS | Mod: PBBFAC,,,

## 2023-04-25 PROCEDURE — 1159F PR MEDICATION LIST DOCUMENTED IN MEDICAL RECORD: ICD-10-PCS | Mod: CPTII,S$GLB,,

## 2023-04-25 PROCEDURE — 1159F MED LIST DOCD IN RCRD: CPT | Mod: CPTII,S$GLB,,

## 2023-04-25 PROCEDURE — 3008F PR BODY MASS INDEX (BMI) DOCUMENTED: ICD-10-PCS | Mod: CPTII,S$GLB,,

## 2023-04-25 PROCEDURE — 72114 X-RAY EXAM L-S SPINE BENDING: CPT | Mod: 26,,, | Performed by: RADIOLOGY

## 2023-04-25 PROCEDURE — 3074F PR MOST RECENT SYSTOLIC BLOOD PRESSURE < 130 MM HG: ICD-10-PCS | Mod: CPTII,S$GLB,,

## 2023-04-25 PROCEDURE — 1101F PR PT FALLS ASSESS DOC 0-1 FALLS W/OUT INJ PAST YR: ICD-10-PCS | Mod: CPTII,S$GLB,,

## 2023-04-25 PROCEDURE — 72114 X-RAY EXAM L-S SPINE BENDING: CPT | Mod: TC,PO

## 2023-04-25 PROCEDURE — 3078F DIAST BP <80 MM HG: CPT | Mod: CPTII,S$GLB,,

## 2023-04-25 PROCEDURE — 3051F HG A1C>EQUAL 7.0%<8.0%: CPT | Mod: CPTII,S$GLB,,

## 2023-04-25 PROCEDURE — 4010F PR ACE/ARB THEARPY RXD/TAKEN: ICD-10-PCS | Mod: CPTII,S$GLB,,

## 2023-04-25 PROCEDURE — 3288F FALL RISK ASSESSMENT DOCD: CPT | Mod: CPTII,S$GLB,,

## 2023-04-25 PROCEDURE — 3078F PR MOST RECENT DIASTOLIC BLOOD PRESSURE < 80 MM HG: ICD-10-PCS | Mod: CPTII,S$GLB,,

## 2023-04-25 PROCEDURE — 99215 OFFICE O/P EST HI 40 MIN: CPT | Mod: S$GLB,,,

## 2023-04-25 PROCEDURE — 3066F NEPHROPATHY DOC TX: CPT | Mod: CPTII,S$GLB,,

## 2023-04-25 PROCEDURE — 1125F PR PAIN SEVERITY QUANTIFIED, PAIN PRESENT: ICD-10-PCS | Mod: CPTII,S$GLB,,

## 2023-04-25 PROCEDURE — 1157F PR ADVANCE CARE PLAN OR EQUIV PRESENT IN MEDICAL RECORD: ICD-10-PCS | Mod: CPTII,S$GLB,,

## 2023-04-25 PROCEDURE — 1101F PT FALLS ASSESS-DOCD LE1/YR: CPT | Mod: CPTII,S$GLB,,

## 2023-04-25 PROCEDURE — 72114 XR LUMBAR SPINE 5 VIEW WITH FLEX AND EXT: ICD-10-PCS | Mod: 26,,, | Performed by: RADIOLOGY

## 2023-04-25 PROCEDURE — 99215 PR OFFICE/OUTPT VISIT, EST, LEVL V, 40-54 MIN: ICD-10-PCS | Mod: S$GLB,,,

## 2023-04-25 PROCEDURE — 99999 PR PBB SHADOW E&M-EST. PATIENT-LVL IV: CPT | Mod: PBBFAC,,,

## 2023-04-25 PROCEDURE — 1125F AMNT PAIN NOTED PAIN PRSNT: CPT | Mod: CPTII,S$GLB,,

## 2023-04-25 PROCEDURE — 72052 X-RAY EXAM NECK SPINE 6/>VWS: CPT | Mod: 26,,, | Performed by: RADIOLOGY

## 2023-04-25 PROCEDURE — 3061F NEG MICROALBUMINURIA REV: CPT | Mod: CPTII,S$GLB,,

## 2023-04-25 PROCEDURE — 3008F BODY MASS INDEX DOCD: CPT | Mod: CPTII,S$GLB,,

## 2023-04-25 PROCEDURE — 4010F ACE/ARB THERAPY RXD/TAKEN: CPT | Mod: CPTII,S$GLB,,

## 2023-04-25 PROCEDURE — 3288F PR FALLS RISK ASSESSMENT DOCUMENTED: ICD-10-PCS | Mod: CPTII,S$GLB,,

## 2023-04-25 PROCEDURE — 1157F ADVNC CARE PLAN IN RCRD: CPT | Mod: CPTII,S$GLB,,

## 2023-04-25 PROCEDURE — 72052 XR CERVICAL SPINE 5 VIEW WITH FLEX AND EXT: ICD-10-PCS | Mod: 26,,, | Performed by: RADIOLOGY

## 2023-04-26 ENCOUNTER — TELEPHONE (OUTPATIENT)
Dept: PAIN MEDICINE | Facility: CLINIC | Age: 73
End: 2023-04-26
Payer: MEDICARE

## 2023-04-26 DIAGNOSIS — M54.2 CERVICALGIA: ICD-10-CM

## 2023-04-26 DIAGNOSIS — M54.16 LUMBAR RADICULOPATHY, CHRONIC: ICD-10-CM

## 2023-04-26 DIAGNOSIS — M54.9 DORSALGIA, UNSPECIFIED: Primary | ICD-10-CM

## 2023-04-26 NOTE — TELEPHONE ENCOUNTER
He is already prescribed medication for his anxiety.  I do not recommend providing any further medication for his anxiety.  I recommend IV sedation.

## 2023-04-26 NOTE — TELEPHONE ENCOUNTER
Pt did not schedule MRI with sedation and is requesting medication to take for his anxiety prior. Please advise.

## 2023-04-28 DIAGNOSIS — G89.29 CHRONIC MIDLINE LOW BACK PAIN WITH RIGHT-SIDED SCIATICA: ICD-10-CM

## 2023-04-28 DIAGNOSIS — M54.41 CHRONIC MIDLINE LOW BACK PAIN WITH RIGHT-SIDED SCIATICA: ICD-10-CM

## 2023-04-28 NOTE — TELEPHONE ENCOUNTER
Care Due:                  Date            Visit Type   Department     Provider  --------------------------------------------------------------------------------                                EP -                              [facility] FAMILY  Last Visit: 01-      CARE ([facility])   PRESTON Almaraz                               -                              [facility]  Next Visit: 05-      CARE ([facility])   MEDICINE       Zach Almaraz                                                            Last  Test          Frequency    Reason                     Performed    Due Date  --------------------------------------------------------------------------------    HBA1C.......  6 months...  glipiZIDE................  01- 07-    [facility] Embedded Care Due Messages. Reference number: [id number].   4/28/2023 9:42:48 AM CDT

## 2023-04-29 RX ORDER — OXYCODONE AND ACETAMINOPHEN 10; 325 MG/1; MG/1
1.5 TABLET ORAL EVERY 6 HOURS PRN
Qty: 180 TABLET | Refills: 0 | Status: SHIPPED | OUTPATIENT
Start: 2023-04-29 | End: 2023-05-25 | Stop reason: SDUPTHER

## 2023-04-30 ENCOUNTER — PATIENT MESSAGE (OUTPATIENT)
Dept: PAIN MEDICINE | Facility: CLINIC | Age: 73
End: 2023-04-30
Payer: MEDICARE

## 2023-05-01 NOTE — TELEPHONE ENCOUNTER
Are they not interested in going to [facility] for the MRI with IV sedation?

## 2023-05-02 ENCOUNTER — TELEPHONE (OUTPATIENT)
Dept: PAIN MEDICINE | Facility: CLINIC | Age: 73
End: 2023-05-02
Payer: MEDICARE

## 2023-05-02 NOTE — TELEPHONE ENCOUNTER
----- Message from Donny May sent at 4/28/2023  9:48 AM CDT -----  Contact: pt's wife Kavin at  [phone]  Type: Needs Medical Advice  Who Called:  pt's wife Kavin  Best Call Back Number: [phone]  Additional Information: pt's wife kavin is calling the office to ask that they send over 2 Vallum pills to the pharmacy below due to her  is taking 2 MRI's and is claustrophobic. Please call back and advise.    [facility] - [facility] [address]  [address] [facility] [address]  Phone: [phone] Fax: [phone]

## 2023-05-02 NOTE — TELEPHONE ENCOUNTER
We previously discussed scheduling his MRI with IV sedation.  I feel like this is the best option for him.  Are they no longer going to do IV sedation?

## 2023-05-18 ENCOUNTER — TELEPHONE (OUTPATIENT)
Dept: FAMILY MEDICINE | Facility: CLINIC | Age: 73
End: 2023-05-18
Payer: MEDICARE

## 2023-05-18 ENCOUNTER — LAB VISIT (OUTPATIENT)
Dept: LAB | Facility: HOSPITAL | Age: 73
End: 2023-05-18
Attending: INTERNAL MEDICINE
Payer: MEDICARE

## 2023-05-18 DIAGNOSIS — E11.59 TYPE 2 DIABETES MELLITUS WITH OTHER CIRCULATORY COMPLICATION, WITHOUT LONG-TERM CURRENT USE OF INSULIN: Primary | ICD-10-CM

## 2023-05-18 DIAGNOSIS — E11.59 TYPE 2 DIABETES MELLITUS WITH OTHER CIRCULATORY COMPLICATION, WITHOUT LONG-TERM CURRENT USE OF INSULIN: ICD-10-CM

## 2023-05-18 DIAGNOSIS — E29.1 HYPOGONADISM IN MALE: ICD-10-CM

## 2023-05-18 LAB
ESTIMATED AVG GLUCOSE: 148 MG/DL (ref 68–131)
HBA1C MFR BLD: 6.8 % (ref 4–5.6)
TESTOST SERPL-MCNC: 20 NG/DL (ref 304–1227)

## 2023-05-18 PROCEDURE — 83036 HEMOGLOBIN GLYCOSYLATED A1C: CPT | Performed by: INTERNAL MEDICINE

## 2023-05-18 PROCEDURE — 84403 ASSAY OF TOTAL TESTOSTERONE: CPT | Performed by: INTERNAL MEDICINE

## 2023-05-18 PROCEDURE — 36415 COLL VENOUS BLD VENIPUNCTURE: CPT | Mod: PO | Performed by: INTERNAL MEDICINE

## 2023-05-18 NOTE — TELEPHONE ENCOUNTER
----- Message from Neema Pettit sent at 5/18/2023  7:59 AM CDT -----  Contact: wife  Type: Needs Medical Advice  Who Called:  Sabra Manuel, wife  Best Call Back Number: [phone]  Additional Information: patient is at the lab in [place] to have labs done, and they were never ordered. Please put in orders so he can have them done. Wife is waiting for a call back .

## 2023-05-25 ENCOUNTER — OFFICE VISIT (OUTPATIENT)
Dept: FAMILY MEDICINE | Facility: CLINIC | Age: 73
End: 2023-05-25
Payer: MEDICARE

## 2023-05-25 VITALS
SYSTOLIC BLOOD PRESSURE: 130 MMHG | HEIGHT: 72 IN | TEMPERATURE: 99 F | OXYGEN SATURATION: 94 % | DIASTOLIC BLOOD PRESSURE: 76 MMHG | BODY MASS INDEX: 42.66 KG/M2 | WEIGHT: 315 LBS | HEART RATE: 74 BPM

## 2023-05-25 DIAGNOSIS — I10 ESSENTIAL HYPERTENSION: ICD-10-CM

## 2023-05-25 DIAGNOSIS — E66.01 CLASS 3 SEVERE OBESITY DUE TO EXCESS CALORIES WITH SERIOUS COMORBIDITY AND BODY MASS INDEX (BMI) OF 40.0 TO 44.9 IN ADULT: ICD-10-CM

## 2023-05-25 DIAGNOSIS — Z86.711 HISTORY OF PULMONARY EMBOLISM: ICD-10-CM

## 2023-05-25 DIAGNOSIS — E29.1 HYPOGONADISM IN MALE: ICD-10-CM

## 2023-05-25 DIAGNOSIS — E11.59 TYPE 2 DIABETES MELLITUS WITH OTHER CIRCULATORY COMPLICATION, WITHOUT LONG-TERM CURRENT USE OF INSULIN: ICD-10-CM

## 2023-05-25 DIAGNOSIS — M54.41 CHRONIC MIDLINE LOW BACK PAIN WITH RIGHT-SIDED SCIATICA: Primary | ICD-10-CM

## 2023-05-25 DIAGNOSIS — Z86.2 HISTORY OF POLYCYTHEMIA: ICD-10-CM

## 2023-05-25 DIAGNOSIS — G89.29 CHRONIC MIDLINE LOW BACK PAIN WITH RIGHT-SIDED SCIATICA: Primary | ICD-10-CM

## 2023-05-25 PROCEDURE — 3008F BODY MASS INDEX DOCD: CPT | Mod: CPTII,S$GLB,, | Performed by: INTERNAL MEDICINE

## 2023-05-25 PROCEDURE — 1157F ADVNC CARE PLAN IN RCRD: CPT | Mod: CPTII,S$GLB,, | Performed by: INTERNAL MEDICINE

## 2023-05-25 PROCEDURE — 1101F PR PT FALLS ASSESS DOC 0-1 FALLS W/OUT INJ PAST YR: ICD-10-PCS | Mod: CPTII,S$GLB,, | Performed by: INTERNAL MEDICINE

## 2023-05-25 PROCEDURE — 3044F HG A1C LEVEL LT 7.0%: CPT | Mod: CPTII,S$GLB,, | Performed by: INTERNAL MEDICINE

## 2023-05-25 PROCEDURE — 1101F PT FALLS ASSESS-DOCD LE1/YR: CPT | Mod: CPTII,S$GLB,, | Performed by: INTERNAL MEDICINE

## 2023-05-25 PROCEDURE — 99999 PR PBB SHADOW E&M-EST. PATIENT-LVL IV: CPT | Mod: PBBFAC,,, | Performed by: INTERNAL MEDICINE

## 2023-05-25 PROCEDURE — 99999 PR PBB SHADOW E&M-EST. PATIENT-LVL IV: ICD-10-PCS | Mod: PBBFAC,,, | Performed by: INTERNAL MEDICINE

## 2023-05-25 PROCEDURE — 4010F PR ACE/ARB THEARPY RXD/TAKEN: ICD-10-PCS | Mod: CPTII,S$GLB,, | Performed by: INTERNAL MEDICINE

## 2023-05-25 PROCEDURE — 3008F PR BODY MASS INDEX (BMI) DOCUMENTED: ICD-10-PCS | Mod: CPTII,S$GLB,, | Performed by: INTERNAL MEDICINE

## 2023-05-25 PROCEDURE — 3066F PR DOCUMENTATION OF TREATMENT FOR NEPHROPATHY: ICD-10-PCS | Mod: CPTII,S$GLB,, | Performed by: INTERNAL MEDICINE

## 2023-05-25 PROCEDURE — 3061F NEG MICROALBUMINURIA REV: CPT | Mod: CPTII,S$GLB,, | Performed by: INTERNAL MEDICINE

## 2023-05-25 PROCEDURE — 3075F PR MOST RECENT SYSTOLIC BLOOD PRESS GE 130-139MM HG: ICD-10-PCS | Mod: CPTII,S$GLB,, | Performed by: INTERNAL MEDICINE

## 2023-05-25 PROCEDURE — 3075F SYST BP GE 130 - 139MM HG: CPT | Mod: CPTII,S$GLB,, | Performed by: INTERNAL MEDICINE

## 2023-05-25 PROCEDURE — 1125F PR PAIN SEVERITY QUANTIFIED, PAIN PRESENT: ICD-10-PCS | Mod: CPTII,S$GLB,, | Performed by: INTERNAL MEDICINE

## 2023-05-25 PROCEDURE — 3044F PR MOST RECENT HEMOGLOBIN A1C LEVEL <7.0%: ICD-10-PCS | Mod: CPTII,S$GLB,, | Performed by: INTERNAL MEDICINE

## 2023-05-25 PROCEDURE — 1159F MED LIST DOCD IN RCRD: CPT | Mod: CPTII,S$GLB,, | Performed by: INTERNAL MEDICINE

## 2023-05-25 PROCEDURE — 99214 PR OFFICE/OUTPT VISIT, EST, LEVL IV, 30-39 MIN: ICD-10-PCS | Mod: S$GLB,,, | Performed by: INTERNAL MEDICINE

## 2023-05-25 PROCEDURE — 1160F RVW MEDS BY RX/DR IN RCRD: CPT | Mod: CPTII,S$GLB,, | Performed by: INTERNAL MEDICINE

## 2023-05-25 PROCEDURE — 1160F PR REVIEW ALL MEDS BY PRESCRIBER/CLIN PHARMACIST DOCUMENTED: ICD-10-PCS | Mod: CPTII,S$GLB,, | Performed by: INTERNAL MEDICINE

## 2023-05-25 PROCEDURE — 3288F FALL RISK ASSESSMENT DOCD: CPT | Mod: CPTII,S$GLB,, | Performed by: INTERNAL MEDICINE

## 2023-05-25 PROCEDURE — 3078F DIAST BP <80 MM HG: CPT | Mod: CPTII,S$GLB,, | Performed by: INTERNAL MEDICINE

## 2023-05-25 PROCEDURE — 99214 OFFICE O/P EST MOD 30 MIN: CPT | Mod: S$GLB,,, | Performed by: INTERNAL MEDICINE

## 2023-05-25 PROCEDURE — 3061F PR NEG MICROALBUMINURIA RESULT DOCUMENTED/REVIEW: ICD-10-PCS | Mod: CPTII,S$GLB,, | Performed by: INTERNAL MEDICINE

## 2023-05-25 PROCEDURE — 3078F PR MOST RECENT DIASTOLIC BLOOD PRESSURE < 80 MM HG: ICD-10-PCS | Mod: CPTII,S$GLB,, | Performed by: INTERNAL MEDICINE

## 2023-05-25 PROCEDURE — 3066F NEPHROPATHY DOC TX: CPT | Mod: CPTII,S$GLB,, | Performed by: INTERNAL MEDICINE

## 2023-05-25 PROCEDURE — 3288F PR FALLS RISK ASSESSMENT DOCUMENTED: ICD-10-PCS | Mod: CPTII,S$GLB,, | Performed by: INTERNAL MEDICINE

## 2023-05-25 PROCEDURE — 1159F PR MEDICATION LIST DOCUMENTED IN MEDICAL RECORD: ICD-10-PCS | Mod: CPTII,S$GLB,, | Performed by: INTERNAL MEDICINE

## 2023-05-25 PROCEDURE — 1125F AMNT PAIN NOTED PAIN PRSNT: CPT | Mod: CPTII,S$GLB,, | Performed by: INTERNAL MEDICINE

## 2023-05-25 PROCEDURE — 1157F PR ADVANCE CARE PLAN OR EQUIV PRESENT IN MEDICAL RECORD: ICD-10-PCS | Mod: CPTII,S$GLB,, | Performed by: INTERNAL MEDICINE

## 2023-05-25 PROCEDURE — 4010F ACE/ARB THERAPY RXD/TAKEN: CPT | Mod: CPTII,S$GLB,, | Performed by: INTERNAL MEDICINE

## 2023-05-25 RX ORDER — TESTOSTERONE CYPIONATE 200 MG/ML
200 INJECTION, SOLUTION INTRAMUSCULAR
Qty: 10 ML | Refills: 0 | Status: SHIPPED | OUTPATIENT
Start: 2023-05-25 | End: 2024-01-05

## 2023-05-25 RX ORDER — OXYCODONE AND ACETAMINOPHEN 10; 325 MG/1; MG/1
1.5 TABLET ORAL EVERY 6 HOURS PRN
Qty: 180 TABLET | Refills: 0 | Status: SHIPPED | OUTPATIENT
Start: 2023-05-25 | End: 2023-06-29 | Stop reason: SDUPTHER

## 2023-05-25 NOTE — PROGRESS NOTES
Patient ID: William Manuel .     Chief Complaint:   Chief Complaint   Patient presents with    Follow-up    Back issues        HPI:  Routine follow-up for back and neck pain and I would like to say he is doing well but honestly he is not.  He was getting the run around from our pain managed department so he decided to go back and see his previous spine surgeon, Dr. Jeffers, for another evaluation.  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  He does still have the lower back pain.  The pain pills I give him temporize the pain but only temporarily.  Dr. Jeffers has plans to do an EMG but it has to wait until June 16th at the earliest.  In the meantime we will continue to manage his pain as best we can.  He does try to keep active and does have a stationary bike at his home as he does not want to lose too much strengthen his legs but even walking in from the parking lot to my office he needs a wheelchair.  I did review his labs and his diabetes is well controlled but his testosterone is the lowest I have ever seen.  He was on testosterone replacement therapy for quite awhile but quit 2 years ago at his own request due to some blood clots that were formed.  I think and he agrees that some testosterone replacement would be in his best interest to at least give him some energy and hopefully make him feel better so we will start 200 mg of testosterone intramuscularly every 2 weeks and repeat some labs in a month or so.  I will keep close eye on his blood counts and I would like him to start taking a full-dose aspirin to prevent any future blood clots.    Review of Systems       Back pain     Objective:      Physical Exam   Physical Exam       Decreased Range of Motion in back   Obese    Vitals:   Vitals:    05/25/23 1411   BP: 130/76   BP Location: Left arm   Patient Position: Sitting   Pulse: 74   Temp: 98.6 °F (37 °C)   SpO2: (!)  94%   Weight: (!) 150.1 kg (330 lb 14.6 oz)   Height: 6' (1.829 m)          Current Outpatient Medications:     amLODIPine-benazepriL (LOTREL) 10-40 mg per capsule, TAKE 1 CAPSULE DAILY, Disp: 90 capsule, Rfl: 3    aspirin (ECOTRIN) 81 MG EC tablet, Take 81 mg by mouth once daily., Disp: , Rfl:     blood sugar diagnostic Strp, To check BG 2 times daily, to use with insurance preferred meter, Disp: 200 strip, Rfl: 12    clonazePAM (KLONOPIN) 0.5 MG tablet, TAKE 1 TABLET TWICE A DAY, Disp: 180 tablet, Rfl: 0    etodolac (LODINE) 400 MG tablet, TAKE 1 TABLET BY MOUTH ONCE DAILY UP TO THREE TIMES DAILY, Disp: 270 tablet, Rfl: 3    ezetimibe (ZETIA) 10 mg tablet, Take 10 mg by mouth once daily., Disp: , Rfl:     furosemide (LASIX) 40 MG tablet, TAKE 1 TABLET DAILY, Disp: 90 tablet, Rfl: 3    glipiZIDE (GLUCOTROL) 5 MG tablet, TAKE 1 TABLET TWICE A DAY BEFORE MEALS, Disp: 180 tablet, Rfl: 1    lancets Misc, To check BG 2 times daily, to use with insurance preferred meter, Disp: 200 each, Rfl: 12    metoprolol succinate (TOPROL-XL) 100 MG 24 hr tablet, TAKE 1 TABLET DAILY (Patient taking differently: 100 mg once daily.), Disp: 90 tablet, Rfl: 3    ondansetron (ZOFRAN-ODT) 8 MG TbDL, Take 8 mg by mouth every 6 (six) hours as needed., Disp: , Rfl:     promethazine (PHENERGAN) 25 MG tablet, Take 1 tablet (25 mg total) by mouth every 6 (six) hours as needed for Nausea., Disp: 20 tablet, Rfl: 0    rivaroxaban (XARELTO ORAL), Take 1 tablet by mouth Daily., Disp: , Rfl:     rosuvastatin (CRESTOR) 20 MG tablet, Take 20 mg by mouth once daily., Disp: , Rfl:     tamsulosin (FLOMAX) 0.4 mg Cap, TAKE 1 CAPSULE DAILY, Disp: 90 capsule, Rfl: 3    zolpidem (AMBIEN) 10 mg Tab, TAKE 1 TABLET(10 MG) BY MOUTH EVERY NIGHT AS NEEDED FOR INSOMNIA, Disp: 30 tablet, Rfl: 5    blood-glucose meter kit, To check BG 2 times daily, to use with insurance preferred meter, Disp: 1 each, Rfl: 0    oxyCODONE-acetaminophen (PERCOCET)  mg per tablet,  Take 1.5 tablets by mouth every 6 (six) hours as needed for Pain., Disp: 180 tablet, Rfl: 0    testosterone cypionate (DEPOTESTOTERONE CYPIONATE) 200 mg/mL injection, Inject 1 mL (200 mg total) into the muscle every 14 (fourteen) days., Disp: 10 mL, Rfl: 0   Assessment:       Patient Active Problem List    Diagnosis Date Noted    History of polycythemia 05/25/2023    Lumbar radiculopathy 01/03/2023    Lumbar spondylosis 10/11/2022    Thoracic aortic aneurysm without rupture 01/21/2022    History of pulmonary embolism 10/21/2021    Cough 10/21/2021    Chronic midline low back pain with right-sided sciatica 10/21/2021    Lumbar degenerative disc disease 03/22/2021    Sciatic nerve pain, right 03/22/2021    Insomnia due to medical condition 07/30/2020    Fatigue 07/30/2020    Other headache syndrome 07/07/2020    Type 2 diabetes mellitus with circulatory disorder, without long-term current use of insulin     Morbid (severe) obesity due to excess calories 07/18/2019    Class 2 obesity due to excess calories with body mass index (BMI) of 36.0 to 36.9 in adult 07/18/2019    VONNIE on CPAP     GERD without esophagitis     BPH (benign prostatic hyperplasia)     Anxiety     Unstable angina 05/31/2019    Renal insufficiency 05/31/2019    Benign prostatic hyperplasia with incomplete bladder emptying 03/13/2018    Acute urinary retention 03/13/2018    Right lower quadrant abdominal pain 03/11/2018    Flank pain, acute 03/11/2018    Migraine headache     H/O heart bypass surgery     Claustrophobia     Anticoagulant long-term use     Spondylosis without myelopathy or radiculopathy, cervical region 05/23/2016    Essential hypertension 05/11/2016    Coronary artery disease involving native coronary artery of native heart without angina pectoris 05/11/2016    Polycythemia 05/11/2016    Acute lower GI bleeding 05/11/2016    Dyslipidemia 05/11/2016    Gastroesophageal reflux disease without esophagitis 05/11/2016    Hypogonadism in male  05/11/2016    Migraine without aura and without status migrainosus, not intractable 05/11/2016    Primary osteoarthritis involving multiple joints 05/11/2016          Plan:       William Manuel Jonas  was seen today for follow-up and may need lab work.    Diagnoses and all orders for this visit:    William was seen today for follow-up and back issues.    Diagnoses and all orders for this visit:    Chronic midline low back pain with right-sided sciatica  -     oxyCODONE-acetaminophen (PERCOCET)  mg per tablet; Take 1.5 tablets by mouth every 6 (six) hours as needed for Pain.  Getting evaluated by Dr. Jeffers   EMG in the future     Hypogonadism in male  -     testosterone cypionate (DEPOTESTOTERONE CYPIONATE) 200 mg/mL injection; Inject 1 mL (200 mg total) into the muscle every 14 (fourteen) days.  -     Testosterone; Future  Monitor on replacement     Type 2 diabetes mellitus with other circulatory complication, without long-term current use of insulin  Controlled with med     Class 3 severe obesity due to excess calories with serious comorbidity and body mass index (BMI) of 40.0 to 44.9 in adult  Monitor    Essential hypertension  Controlled with med     History of polycythemia  -     CBC Auto Differential; Future  Monitor Labs    History of pulmonary embolism  Take Aspirin 325 mg / day

## 2023-06-21 ENCOUNTER — PATIENT MESSAGE (OUTPATIENT)
Dept: FAMILY MEDICINE | Facility: CLINIC | Age: 73
End: 2023-06-21
Payer: MEDICARE

## 2023-06-29 DIAGNOSIS — G89.29 CHRONIC MIDLINE LOW BACK PAIN WITH RIGHT-SIDED SCIATICA: ICD-10-CM

## 2023-06-29 DIAGNOSIS — G47.01 INSOMNIA DUE TO MEDICAL CONDITION: ICD-10-CM

## 2023-06-29 DIAGNOSIS — M54.41 CHRONIC MIDLINE LOW BACK PAIN WITH RIGHT-SIDED SCIATICA: ICD-10-CM

## 2023-06-29 RX ORDER — OXYCODONE AND ACETAMINOPHEN 10; 325 MG/1; MG/1
1.5 TABLET ORAL EVERY 6 HOURS PRN
Qty: 180 TABLET | Refills: 0 | Status: SHIPPED | OUTPATIENT
Start: 2023-06-29 | End: 2023-07-27 | Stop reason: SDUPTHER

## 2023-06-29 RX ORDER — ZOLPIDEM TARTRATE 10 MG/1
10 TABLET ORAL NIGHTLY PRN
Qty: 30 TABLET | Refills: 5 | Status: SHIPPED | OUTPATIENT
Start: 2023-06-29 | End: 2023-12-07 | Stop reason: SDUPTHER

## 2023-06-29 NOTE — TELEPHONE ENCOUNTER
No care due was identified.  Health [facility] Embedded Care Due Messages. Reference number: [id number].   6/29/2023 8:53:46 AM CDT

## 2023-07-26 DIAGNOSIS — F41.9 ANXIETY: ICD-10-CM

## 2023-07-26 NOTE — TELEPHONE ENCOUNTER
No care due was identified.  [facility] Embedded Care Due Messages. Reference number: [id number].   7/26/2023 5:31:17 PM CDT

## 2023-07-27 DIAGNOSIS — G89.29 CHRONIC MIDLINE LOW BACK PAIN WITH RIGHT-SIDED SCIATICA: ICD-10-CM

## 2023-07-27 DIAGNOSIS — M54.41 CHRONIC MIDLINE LOW BACK PAIN WITH RIGHT-SIDED SCIATICA: ICD-10-CM

## 2023-07-27 RX ORDER — CLONAZEPAM 0.5 MG/1
TABLET ORAL
Qty: 180 TABLET | Refills: 0 | Status: SHIPPED | OUTPATIENT
Start: 2023-07-27

## 2023-07-27 NOTE — TELEPHONE ENCOUNTER
No care due was identified.  [facility] Embedded Care Due Messages. Reference number: [id number].   7/27/2023 12:28:11 PM CDT

## 2023-07-29 RX ORDER — OXYCODONE AND ACETAMINOPHEN 10; 325 MG/1; MG/1
1.5 TABLET ORAL EVERY 6 HOURS PRN
Qty: 180 TABLET | Refills: 0 | Status: SHIPPED | OUTPATIENT
Start: 2023-07-29 | End: 2023-08-25 | Stop reason: SDUPTHER

## 2023-08-25 DIAGNOSIS — G89.29 CHRONIC MIDLINE LOW BACK PAIN WITH RIGHT-SIDED SCIATICA: ICD-10-CM

## 2023-08-25 DIAGNOSIS — M54.41 CHRONIC MIDLINE LOW BACK PAIN WITH RIGHT-SIDED SCIATICA: ICD-10-CM

## 2023-08-25 NOTE — TELEPHONE ENCOUNTER
Care Due:                  Date            Visit Type   Department     Provider  --------------------------------------------------------------------------------                                EP -                              [facility] FAMILY  Last Visit: 05-      CARE ([facility])   PRESTON Almaraz                               -                              [facility]  Next Visit: 09-      CARE ([facility])   MEDICINE       Zach Almaraz                                                            Last  Test          Frequency    Reason                     Performed    Due Date  --------------------------------------------------------------------------------    HBA1C.......  6 months...  glipiZIDE................  05- 11-    [facility] Embedded Care Due Messages. Reference number: [id number].   8/25/2023 12:50:35 PM CDT

## 2023-08-26 RX ORDER — OXYCODONE AND ACETAMINOPHEN 10; 325 MG/1; MG/1
1.5 TABLET ORAL EVERY 6 HOURS PRN
Qty: 180 TABLET | Refills: 0 | Status: SHIPPED | OUTPATIENT
Start: 2023-08-26 | End: 2023-09-27 | Stop reason: SDUPTHER

## 2023-09-20 RX ORDER — METOPROLOL SUCCINATE 100 MG/1
TABLET, EXTENDED RELEASE ORAL
Qty: 90 TABLET | Refills: 2 | Status: SHIPPED | OUTPATIENT
Start: 2023-09-20

## 2023-09-20 NOTE — TELEPHONE ENCOUNTER
No care due was identified.  [facility] Embedded Care Due Messages. Reference number: [id number].   9/19/2023 11:18:48 PM CDT

## 2023-09-20 NOTE — TELEPHONE ENCOUNTER
Refill Decision Note   William Manuel  is requesting a refill authorization.  Brief Assessment and Rationale for Refill:  Approve     Medication Therapy Plan:       Medication Reconciliation Completed: No   Comments:     No Care Gaps recommended.     Note composed:11:24 AM 09/20/2023

## 2023-09-20 NOTE — TELEPHONE ENCOUNTER
Refill Routing Note   Medication(s) are not appropriate for processing by [facility] for the following reason(s):      Drug-disease interaction: metoprolol succinate and Type 2 diabetes mellitus with circulatory disorder, without long-term current use of insulin    [facility] action(s):  Defer Care Due:  None identified        Pharmacist review requested: Yes     Appointments  past 12m or future 3m with PCP    Date Provider   Last Visit   5/25/2023 Zach Almaraz MD   Next Visit   9/27/2023 Zach Almaraz MD   ED visits in past 90 days: 0        Note composed:11:15 AM 09/20/2023

## 2023-09-27 ENCOUNTER — OFFICE VISIT (OUTPATIENT)
Dept: FAMILY MEDICINE | Facility: CLINIC | Age: 73
End: 2023-09-27
Payer: MEDICARE

## 2023-09-27 VITALS
HEIGHT: 72 IN | WEIGHT: 263 LBS | DIASTOLIC BLOOD PRESSURE: 74 MMHG | SYSTOLIC BLOOD PRESSURE: 106 MMHG | BODY MASS INDEX: 35.62 KG/M2 | OXYGEN SATURATION: 95 % | HEART RATE: 77 BPM

## 2023-09-27 DIAGNOSIS — M54.41 CHRONIC MIDLINE LOW BACK PAIN WITH RIGHT-SIDED SCIATICA: ICD-10-CM

## 2023-09-27 DIAGNOSIS — E11.59 TYPE 2 DIABETES MELLITUS WITH OTHER CIRCULATORY COMPLICATION, WITHOUT LONG-TERM CURRENT USE OF INSULIN: ICD-10-CM

## 2023-09-27 DIAGNOSIS — I25.10 CORONARY ARTERY DISEASE INVOLVING NATIVE CORONARY ARTERY OF NATIVE HEART WITHOUT ANGINA PECTORIS: ICD-10-CM

## 2023-09-27 DIAGNOSIS — G89.29 CHRONIC MIDLINE LOW BACK PAIN WITH RIGHT-SIDED SCIATICA: ICD-10-CM

## 2023-09-27 DIAGNOSIS — I10 ESSENTIAL HYPERTENSION: ICD-10-CM

## 2023-09-27 DIAGNOSIS — F41.9 ANXIETY: ICD-10-CM

## 2023-09-27 DIAGNOSIS — Z86.711 HISTORY OF PULMONARY EMBOLISM: ICD-10-CM

## 2023-09-27 DIAGNOSIS — E66.01 CLASS 2 SEVERE OBESITY DUE TO EXCESS CALORIES WITH SERIOUS COMORBIDITY AND BODY MASS INDEX (BMI) OF 36.0 TO 36.9 IN ADULT: ICD-10-CM

## 2023-09-27 DIAGNOSIS — Z00.00 WELLNESS EXAMINATION: Primary | ICD-10-CM

## 2023-09-27 DIAGNOSIS — E29.1 HYPOGONADISM IN MALE: ICD-10-CM

## 2023-09-27 DIAGNOSIS — K21.9 GASTROESOPHAGEAL REFLUX DISEASE WITHOUT ESOPHAGITIS: ICD-10-CM

## 2023-09-27 PROBLEM — E66.812 CLASS 2 SEVERE OBESITY DUE TO EXCESS CALORIES WITH SERIOUS COMORBIDITY AND BODY MASS INDEX (BMI) OF 36.0 TO 36.9 IN ADULT: Status: ACTIVE | Noted: 2019-07-18

## 2023-09-27 PROCEDURE — 99214 PR OFFICE/OUTPT VISIT, EST, LEVL IV, 30-39 MIN: ICD-10-PCS | Mod: S$GLB,,, | Performed by: INTERNAL MEDICINE

## 2023-09-27 PROCEDURE — G0008 FLU VACCINE - QUADRIVALENT - ADJUVANTED: ICD-10-PCS | Mod: S$GLB,,, | Performed by: INTERNAL MEDICINE

## 2023-09-27 PROCEDURE — 3074F PR MOST RECENT SYSTOLIC BLOOD PRESSURE < 130 MM HG: ICD-10-PCS | Mod: CPTII,S$GLB,, | Performed by: INTERNAL MEDICINE

## 2023-09-27 PROCEDURE — 1160F PR REVIEW ALL MEDS BY PRESCRIBER/CLIN PHARMACIST DOCUMENTED: ICD-10-PCS | Mod: CPTII,S$GLB,, | Performed by: INTERNAL MEDICINE

## 2023-09-27 PROCEDURE — 99214 OFFICE O/P EST MOD 30 MIN: CPT | Mod: S$GLB,,, | Performed by: INTERNAL MEDICINE

## 2023-09-27 PROCEDURE — 3078F PR MOST RECENT DIASTOLIC BLOOD PRESSURE < 80 MM HG: ICD-10-PCS | Mod: CPTII,S$GLB,, | Performed by: INTERNAL MEDICINE

## 2023-09-27 PROCEDURE — 99999 PR PBB SHADOW E&M-EST. PATIENT-LVL IV: CPT | Mod: PBBFAC,,, | Performed by: INTERNAL MEDICINE

## 2023-09-27 PROCEDURE — 3061F NEG MICROALBUMINURIA REV: CPT | Mod: CPTII,S$GLB,, | Performed by: INTERNAL MEDICINE

## 2023-09-27 PROCEDURE — 3066F PR DOCUMENTATION OF TREATMENT FOR NEPHROPATHY: ICD-10-PCS | Mod: CPTII,S$GLB,, | Performed by: INTERNAL MEDICINE

## 2023-09-27 PROCEDURE — 1125F AMNT PAIN NOTED PAIN PRSNT: CPT | Mod: CPTII,S$GLB,, | Performed by: INTERNAL MEDICINE

## 2023-09-27 PROCEDURE — 1125F PR PAIN SEVERITY QUANTIFIED, PAIN PRESENT: ICD-10-PCS | Mod: CPTII,S$GLB,, | Performed by: INTERNAL MEDICINE

## 2023-09-27 PROCEDURE — 3288F FALL RISK ASSESSMENT DOCD: CPT | Mod: CPTII,S$GLB,, | Performed by: INTERNAL MEDICINE

## 2023-09-27 PROCEDURE — 3008F PR BODY MASS INDEX (BMI) DOCUMENTED: ICD-10-PCS | Mod: CPTII,S$GLB,, | Performed by: INTERNAL MEDICINE

## 2023-09-27 PROCEDURE — 1101F PT FALLS ASSESS-DOCD LE1/YR: CPT | Mod: CPTII,S$GLB,, | Performed by: INTERNAL MEDICINE

## 2023-09-27 PROCEDURE — 3008F BODY MASS INDEX DOCD: CPT | Mod: CPTII,S$GLB,, | Performed by: INTERNAL MEDICINE

## 2023-09-27 PROCEDURE — 1160F RVW MEDS BY RX/DR IN RCRD: CPT | Mod: CPTII,S$GLB,, | Performed by: INTERNAL MEDICINE

## 2023-09-27 PROCEDURE — 3061F PR NEG MICROALBUMINURIA RESULT DOCUMENTED/REVIEW: ICD-10-PCS | Mod: CPTII,S$GLB,, | Performed by: INTERNAL MEDICINE

## 2023-09-27 PROCEDURE — 1159F MED LIST DOCD IN RCRD: CPT | Mod: CPTII,S$GLB,, | Performed by: INTERNAL MEDICINE

## 2023-09-27 PROCEDURE — 1159F PR MEDICATION LIST DOCUMENTED IN MEDICAL RECORD: ICD-10-PCS | Mod: CPTII,S$GLB,, | Performed by: INTERNAL MEDICINE

## 2023-09-27 PROCEDURE — 3074F SYST BP LT 130 MM HG: CPT | Mod: CPTII,S$GLB,, | Performed by: INTERNAL MEDICINE

## 2023-09-27 PROCEDURE — 4010F PR ACE/ARB THEARPY RXD/TAKEN: ICD-10-PCS | Mod: CPTII,S$GLB,, | Performed by: INTERNAL MEDICINE

## 2023-09-27 PROCEDURE — G0008 ADMIN INFLUENZA VIRUS VAC: HCPCS | Mod: S$GLB,,, | Performed by: INTERNAL MEDICINE

## 2023-09-27 PROCEDURE — 3044F PR MOST RECENT HEMOGLOBIN A1C LEVEL <7.0%: ICD-10-PCS | Mod: CPTII,S$GLB,, | Performed by: INTERNAL MEDICINE

## 2023-09-27 PROCEDURE — 99999 PR PBB SHADOW E&M-EST. PATIENT-LVL IV: ICD-10-PCS | Mod: PBBFAC,,, | Performed by: INTERNAL MEDICINE

## 2023-09-27 PROCEDURE — 1157F PR ADVANCE CARE PLAN OR EQUIV PRESENT IN MEDICAL RECORD: ICD-10-PCS | Mod: CPTII,S$GLB,, | Performed by: INTERNAL MEDICINE

## 2023-09-27 PROCEDURE — 3288F PR FALLS RISK ASSESSMENT DOCUMENTED: ICD-10-PCS | Mod: CPTII,S$GLB,, | Performed by: INTERNAL MEDICINE

## 2023-09-27 PROCEDURE — 90694 VACC AIIV4 NO PRSRV 0.5ML IM: CPT | Mod: S$GLB,,, | Performed by: INTERNAL MEDICINE

## 2023-09-27 PROCEDURE — 1157F ADVNC CARE PLAN IN RCRD: CPT | Mod: CPTII,S$GLB,, | Performed by: INTERNAL MEDICINE

## 2023-09-27 PROCEDURE — 4010F ACE/ARB THERAPY RXD/TAKEN: CPT | Mod: CPTII,S$GLB,, | Performed by: INTERNAL MEDICINE

## 2023-09-27 PROCEDURE — 90694 FLU VACCINE - QUADRIVALENT - ADJUVANTED: ICD-10-PCS | Mod: S$GLB,,, | Performed by: INTERNAL MEDICINE

## 2023-09-27 PROCEDURE — 3078F DIAST BP <80 MM HG: CPT | Mod: CPTII,S$GLB,, | Performed by: INTERNAL MEDICINE

## 2023-09-27 PROCEDURE — 3044F HG A1C LEVEL LT 7.0%: CPT | Mod: CPTII,S$GLB,, | Performed by: INTERNAL MEDICINE

## 2023-09-27 PROCEDURE — 1101F PR PT FALLS ASSESS DOC 0-1 FALLS W/OUT INJ PAST YR: ICD-10-PCS | Mod: CPTII,S$GLB,, | Performed by: INTERNAL MEDICINE

## 2023-09-27 PROCEDURE — 3066F NEPHROPATHY DOC TX: CPT | Mod: CPTII,S$GLB,, | Performed by: INTERNAL MEDICINE

## 2023-09-27 RX ORDER — OMEPRAZOLE 40 MG/1
40 CAPSULE, DELAYED RELEASE ORAL DAILY
Qty: 90 CAPSULE | Refills: 3 | Status: SHIPPED | OUTPATIENT
Start: 2023-09-27 | End: 2024-09-26

## 2023-09-27 RX ORDER — OXYCODONE AND ACETAMINOPHEN 10; 325 MG/1; MG/1
1 TABLET ORAL EVERY 6 HOURS PRN
Qty: 120 TABLET | Refills: 0 | Status: SHIPPED | OUTPATIENT
Start: 2023-09-27 | End: 2023-11-08 | Stop reason: SDUPTHER

## 2023-09-27 RX ORDER — OXYCODONE AND ACETAMINOPHEN 10; 325 MG/1; MG/1
1 TABLET ORAL EVERY 6 HOURS PRN
Qty: 120 TABLET | Refills: 0 | Status: SHIPPED | OUTPATIENT
Start: 2023-09-27 | End: 2023-09-27 | Stop reason: SDUPTHER

## 2023-09-27 NOTE — PROGRESS NOTES
[facility]  Primary Care   [address] [facility] [address]       Patient ID: William Manuel .     Chief Complaint:   Chief Complaint   Patient presents with    Follow-up     4 month f/u         HPI:  Annual exam and I think he is doing a little better now since he just had surgery 14 days ago by Dr. Dat Pearson to relieve nerve compression at the L5/S1 vertebral segment.  Apparently he had a spacer put in the L5-S1 level with a cage and screws to relieve the compression.  He is still hurting from the surgery but overall the pain is improving.  I have maintained him on Percocet 10/3251.5 pills every 6 hours as needed for pain.  He is run into an issue with having his local Pharmacy provide him with an adequate supply of Percocet and I think that acid do with nationwide shortage of of it.  We are going to take this time start wean the Percocet down to 1 pill every 6 hours as needed for pain.  I truly hope in a month or so that he is much better.  Vital signs do look good.  We did take the time to discuss health maintenance and he does consent to a flu shot today and I would like him to get a shingles shot at his local pharmacy in the next 2 weeks.  He is going to be due for labs but we are going to delay that for a few weeks and include a testosterone level 4 days after his last testosterone shot.  He is due for diabetic eye exam and I have placed that referral and hopefully they can get a seen within a few weeks.    Review of Systems       Back pain     Objective:      Physical Exam   Physical Exam       LSO brace    Protective Sensation (w/ 10 gram monofilament):  Right: Intact  Left: Intact    Visual Inspection:  Normal -  Bilateral    Pedal Pulses:   Right: Present  Left: Present    Posterior Tibialis Pulses:   Right:Present  Left: Present     Vitals:   Vitals:    09/27/23 1437   BP: 106/74   Pulse: 77   SpO2: 95%   Weight: 119.3 kg (263 lb)   Height: 6' (1.829 m)        Assessment:            Plan:       William Manuel Jr.  was seen today for follow-up and may need lab work.    Diagnoses and all orders for this visit:    William was seen today for follow-up.    Diagnoses and all orders for this visit:    Wellness examination    Gastroesophageal reflux disease without esophagitis  -     omeprazole (PRILOSEC) 40 MG capsule; Take 1 capsule (40 mg total) by mouth once daily.  Controlled with med     Type 2 diabetes mellitus with other circulatory complication, without long-term current use of insulin  -     CBC Auto Differential; Future  -     Comprehensive Metabolic Panel; Future  -     Hemoglobin A1C; Future  -     Lipid Panel; Future  -     Ambulatory referral/consult to Optometry; Future  Check labs      Hypogonadism in male  -     Testosterone; Future  Check labs      Chronic midline low back pain with right-sided sciatica  -     Discontinue: oxyCODONE-acetaminophen (PERCOCET)  mg per tablet; Take 1 tablet by mouth every 6 (six) hours as needed for Pain.  -     oxyCODONE-acetaminophen (PERCOCET)  mg per tablet; Take 1 tablet by mouth every 6 (six) hours as needed for Pain.  Improving     Class 2 severe obesity due to excess calories with serious comorbidity and body mass index (BMI) of 36.0 to 36.9 in adult  Monitor     Essential hypertension  Controlled with med     History of pulmonary embolism  Stable    Coronary artery disease involving native coronary artery of native heart without angina pectoris  Stable    Anxiety  Controlled with med          Zach Almaraz MD

## 2023-11-08 DIAGNOSIS — G89.29 CHRONIC MIDLINE LOW BACK PAIN WITH RIGHT-SIDED SCIATICA: ICD-10-CM

## 2023-11-08 DIAGNOSIS — M54.41 CHRONIC MIDLINE LOW BACK PAIN WITH RIGHT-SIDED SCIATICA: ICD-10-CM

## 2023-11-08 RX ORDER — OXYCODONE AND ACETAMINOPHEN 10; 325 MG/1; MG/1
1 TABLET ORAL EVERY 6 HOURS PRN
Qty: 120 TABLET | Refills: 0 | Status: SHIPPED | OUTPATIENT
Start: 2023-11-08 | End: 2023-12-07 | Stop reason: SDUPTHER

## 2023-11-08 NOTE — TELEPHONE ENCOUNTER
Care Due:                  Date            Visit Type   Department     Provider  --------------------------------------------------------------------------------                                EP -                              PRIMARY      [facility] FAMILY  Last Visit: 09-      CARE ([facility])   PRESTON Almaraz                               -                              [facility]  Next Visit: 12-      CARE ([facility])   PRESTON Almaraz                                                            Last  Test          Frequency    Reason                     Performed    Due Date  --------------------------------------------------------------------------------    CBC.........  12 months..  etodolac.................  01- 01-    CMP.........  12 months..  amLODIPine-benazepriL,     02- 01-                             etodolac, glipiZIDE......    HBA1C.......  6 months...  glipiZIDE................  05- 11-    Health [facility] Embedded Care Due Messages. Reference number: [id number].   11/08/2023 9:34:09 AM CST

## 2023-12-01 RX ORDER — FUROSEMIDE 40 MG/1
TABLET ORAL
Qty: 90 TABLET | Refills: 0 | Status: SHIPPED | OUTPATIENT
Start: 2023-12-01 | End: 2024-03-20

## 2023-12-01 NOTE — TELEPHONE ENCOUNTER
No care due was identified.  Health [facility] Embedded Care Due Messages. Reference number: [id number].   11/30/2023 11:09:51 PM CST

## 2023-12-01 NOTE — TELEPHONE ENCOUNTER
Refill Decision Note   William Chávezshelby  is requesting a refill authorization.  Brief Assessment and Rationale for Refill:  Approve     Medication Therapy Plan:         Comments:     Note composed:11:07 AM 12/01/2023

## 2023-12-07 DIAGNOSIS — N40.0 BENIGN PROSTATIC HYPERPLASIA WITHOUT LOWER URINARY TRACT SYMPTOMS: ICD-10-CM

## 2023-12-07 DIAGNOSIS — M54.41 CHRONIC MIDLINE LOW BACK PAIN WITH RIGHT-SIDED SCIATICA: ICD-10-CM

## 2023-12-07 DIAGNOSIS — G47.01 INSOMNIA DUE TO MEDICAL CONDITION: ICD-10-CM

## 2023-12-07 DIAGNOSIS — G89.29 CHRONIC MIDLINE LOW BACK PAIN WITH RIGHT-SIDED SCIATICA: ICD-10-CM

## 2023-12-07 NOTE — TELEPHONE ENCOUNTER
No care due was identified.  Health [facility] Embedded Care Due Messages. Reference number: [id number].   12/07/2023 12:45:41 PM CST

## 2023-12-08 RX ORDER — ZOLPIDEM TARTRATE 10 MG/1
10 TABLET ORAL NIGHTLY PRN
Qty: 30 TABLET | Refills: 5 | Status: SHIPPED | OUTPATIENT
Start: 2023-12-08 | End: 2023-12-19 | Stop reason: SDUPTHER

## 2023-12-08 RX ORDER — TAMSULOSIN HYDROCHLORIDE 0.4 MG/1
CAPSULE ORAL
Qty: 90 CAPSULE | Refills: 3 | Status: SHIPPED | OUTPATIENT
Start: 2023-12-08

## 2023-12-08 RX ORDER — OXYCODONE AND ACETAMINOPHEN 10; 325 MG/1; MG/1
1 TABLET ORAL EVERY 6 HOURS PRN
Qty: 120 TABLET | Refills: 0 | Status: SHIPPED | OUTPATIENT
Start: 2023-12-08 | End: 2024-01-02 | Stop reason: SDUPTHER

## 2023-12-08 NOTE — TELEPHONE ENCOUNTER
Refill Decision Note   William Chávezshelby  is requesting a refill authorization.  Brief Assessment and Rationale for Refill:  Approve     Medication Therapy Plan:         Comments:     Note composed:5:04 PM 12/08/2023

## 2023-12-08 NOTE — TELEPHONE ENCOUNTER
No care due was identified.  Health [facility] Embedded Care Due Messages. Reference number: [id number].   12/07/2023 11:14:52 PM CST

## 2023-12-14 ENCOUNTER — TELEPHONE (OUTPATIENT)
Dept: RESEARCH | Facility: HOSPITAL | Age: 73
End: 2023-12-14
Payer: MEDICARE

## 2023-12-14 NOTE — TELEPHONE ENCOUNTER
Study Title: Transcending COVID-19 barriers to pain care in rural [place]: Pragmatic comparative effectiveness trial of evidence-based, on-demand, digital behavioral treatments for chronic pain.  Sponsor:  [facility]   Study: [facility] Digital Pain Treatment Study - Transcending COVID-19 barriers to pain care in [place]: Pragmatic comparative effectiveness trial of evidence-based, on-demand, digital behavioral treatments for chronic pain.   IRB/Protocol #: 2021.177 - Phase 2?  Study#([facility]):  [id number]  Principle Investigator - Abhinav Combs   Sub-Investigator - José Ding   Sponsor:  [facility] Screening Interest in Study Call    Attempt #: 1      1. Contact Made: [x]Yes []No   1A. If yes, contact date: 12/14/23  1B. If no, date of final contact attempt: n/a  1C. If no, reason(s) contact not made:  []Wrong number []No response []Other   1D. If other, please specify: n/a    2. Verbal commitment: []Yes  [x]No  2A. If yes, verbal commitment date: n/a  2B. If no, reason: []Exclusion Criteria Met  []Desire not to participate []No reason provided [x]Other  2B1. If Type of Exclusion Criteria Met or other reason, specify: Patient stated he was busy at the moment and did not want a call back.

## 2023-12-19 ENCOUNTER — OFFICE VISIT (OUTPATIENT)
Dept: FAMILY MEDICINE | Facility: CLINIC | Age: 73
End: 2023-12-19
Payer: MEDICARE

## 2023-12-19 VITALS
DIASTOLIC BLOOD PRESSURE: 78 MMHG | BODY MASS INDEX: 34.88 KG/M2 | OXYGEN SATURATION: 96 % | SYSTOLIC BLOOD PRESSURE: 114 MMHG | HEIGHT: 72 IN | WEIGHT: 257.5 LBS | HEART RATE: 71 BPM

## 2023-12-19 DIAGNOSIS — K21.9 GASTROESOPHAGEAL REFLUX DISEASE WITHOUT ESOPHAGITIS: ICD-10-CM

## 2023-12-19 DIAGNOSIS — G44.89 OTHER HEADACHE SYNDROME: ICD-10-CM

## 2023-12-19 DIAGNOSIS — M54.41 CHRONIC MIDLINE LOW BACK PAIN WITH RIGHT-SIDED SCIATICA: ICD-10-CM

## 2023-12-19 DIAGNOSIS — I25.10 CORONARY ARTERY DISEASE INVOLVING NATIVE CORONARY ARTERY OF NATIVE HEART WITHOUT ANGINA PECTORIS: ICD-10-CM

## 2023-12-19 DIAGNOSIS — I10 ESSENTIAL HYPERTENSION: ICD-10-CM

## 2023-12-19 DIAGNOSIS — E78.49 OTHER HYPERLIPIDEMIA: ICD-10-CM

## 2023-12-19 DIAGNOSIS — G89.29 CHRONIC MIDLINE LOW BACK PAIN WITH RIGHT-SIDED SCIATICA: ICD-10-CM

## 2023-12-19 DIAGNOSIS — E66.09 CLASS 1 OBESITY DUE TO EXCESS CALORIES WITH SERIOUS COMORBIDITY AND BODY MASS INDEX (BMI) OF 34.0 TO 34.9 IN ADULT: ICD-10-CM

## 2023-12-19 DIAGNOSIS — E11.59 TYPE 2 DIABETES MELLITUS WITH OTHER CIRCULATORY COMPLICATION, WITHOUT LONG-TERM CURRENT USE OF INSULIN: Primary | ICD-10-CM

## 2023-12-19 DIAGNOSIS — G47.01 INSOMNIA DUE TO MEDICAL CONDITION: ICD-10-CM

## 2023-12-19 PROBLEM — N28.9 RENAL INSUFFICIENCY: Status: RESOLVED | Noted: 2019-05-31 | Resolved: 2023-12-19

## 2023-12-19 PROBLEM — R10.9 FLANK PAIN, ACUTE: Status: RESOLVED | Noted: 2018-03-11 | Resolved: 2023-12-19

## 2023-12-19 PROBLEM — E66.811 CLASS 1 OBESITY DUE TO EXCESS CALORIES WITH SERIOUS COMORBIDITY AND BODY MASS INDEX (BMI) OF 34.0 TO 34.9 IN ADULT: Status: ACTIVE | Noted: 2019-07-18

## 2023-12-19 PROBLEM — E66.812 CLASS 2 OBESITY DUE TO EXCESS CALORIES WITH BODY MASS INDEX (BMI) OF 36.0 TO 36.9 IN ADULT: Status: RESOLVED | Noted: 2019-07-18 | Resolved: 2023-12-19

## 2023-12-19 PROBLEM — R10.31 RIGHT LOWER QUADRANT ABDOMINAL PAIN: Status: RESOLVED | Noted: 2018-03-11 | Resolved: 2023-12-19

## 2023-12-19 PROCEDURE — 3008F PR BODY MASS INDEX (BMI) DOCUMENTED: ICD-10-PCS | Mod: CPTII,S$GLB,, | Performed by: INTERNAL MEDICINE

## 2023-12-19 PROCEDURE — 1125F PR PAIN SEVERITY QUANTIFIED, PAIN PRESENT: ICD-10-PCS | Mod: CPTII,S$GLB,, | Performed by: INTERNAL MEDICINE

## 2023-12-19 PROCEDURE — 1160F PR REVIEW ALL MEDS BY PRESCRIBER/CLIN PHARMACIST DOCUMENTED: ICD-10-PCS | Mod: CPTII,S$GLB,, | Performed by: INTERNAL MEDICINE

## 2023-12-19 PROCEDURE — 3066F PR DOCUMENTATION OF TREATMENT FOR NEPHROPATHY: ICD-10-PCS | Mod: CPTII,S$GLB,, | Performed by: INTERNAL MEDICINE

## 2023-12-19 PROCEDURE — 3078F PR MOST RECENT DIASTOLIC BLOOD PRESSURE < 80 MM HG: ICD-10-PCS | Mod: CPTII,S$GLB,, | Performed by: INTERNAL MEDICINE

## 2023-12-19 PROCEDURE — 99214 OFFICE O/P EST MOD 30 MIN: CPT | Mod: S$GLB,,, | Performed by: INTERNAL MEDICINE

## 2023-12-19 PROCEDURE — 1125F AMNT PAIN NOTED PAIN PRSNT: CPT | Mod: CPTII,S$GLB,, | Performed by: INTERNAL MEDICINE

## 2023-12-19 PROCEDURE — 1157F ADVNC CARE PLAN IN RCRD: CPT | Mod: CPTII,S$GLB,, | Performed by: INTERNAL MEDICINE

## 2023-12-19 PROCEDURE — 1157F PR ADVANCE CARE PLAN OR EQUIV PRESENT IN MEDICAL RECORD: ICD-10-PCS | Mod: CPTII,S$GLB,, | Performed by: INTERNAL MEDICINE

## 2023-12-19 PROCEDURE — 1101F PT FALLS ASSESS-DOCD LE1/YR: CPT | Mod: CPTII,S$GLB,, | Performed by: INTERNAL MEDICINE

## 2023-12-19 PROCEDURE — 99999 PR PBB SHADOW E&M-EST. PATIENT-LVL IV: ICD-10-PCS | Mod: PBBFAC,,, | Performed by: INTERNAL MEDICINE

## 2023-12-19 PROCEDURE — 3008F BODY MASS INDEX DOCD: CPT | Mod: CPTII,S$GLB,, | Performed by: INTERNAL MEDICINE

## 2023-12-19 PROCEDURE — 3044F HG A1C LEVEL LT 7.0%: CPT | Mod: CPTII,S$GLB,, | Performed by: INTERNAL MEDICINE

## 2023-12-19 PROCEDURE — 3061F NEG MICROALBUMINURIA REV: CPT | Mod: CPTII,S$GLB,, | Performed by: INTERNAL MEDICINE

## 2023-12-19 PROCEDURE — 4010F PR ACE/ARB THEARPY RXD/TAKEN: ICD-10-PCS | Mod: CPTII,S$GLB,, | Performed by: INTERNAL MEDICINE

## 2023-12-19 PROCEDURE — 3074F PR MOST RECENT SYSTOLIC BLOOD PRESSURE < 130 MM HG: ICD-10-PCS | Mod: CPTII,S$GLB,, | Performed by: INTERNAL MEDICINE

## 2023-12-19 PROCEDURE — 3061F PR NEG MICROALBUMINURIA RESULT DOCUMENTED/REVIEW: ICD-10-PCS | Mod: CPTII,S$GLB,, | Performed by: INTERNAL MEDICINE

## 2023-12-19 PROCEDURE — 1160F RVW MEDS BY RX/DR IN RCRD: CPT | Mod: CPTII,S$GLB,, | Performed by: INTERNAL MEDICINE

## 2023-12-19 PROCEDURE — 3066F NEPHROPATHY DOC TX: CPT | Mod: CPTII,S$GLB,, | Performed by: INTERNAL MEDICINE

## 2023-12-19 PROCEDURE — 3074F SYST BP LT 130 MM HG: CPT | Mod: CPTII,S$GLB,, | Performed by: INTERNAL MEDICINE

## 2023-12-19 PROCEDURE — 1159F MED LIST DOCD IN RCRD: CPT | Mod: CPTII,S$GLB,, | Performed by: INTERNAL MEDICINE

## 2023-12-19 PROCEDURE — 1159F PR MEDICATION LIST DOCUMENTED IN MEDICAL RECORD: ICD-10-PCS | Mod: CPTII,S$GLB,, | Performed by: INTERNAL MEDICINE

## 2023-12-19 PROCEDURE — 3078F DIAST BP <80 MM HG: CPT | Mod: CPTII,S$GLB,, | Performed by: INTERNAL MEDICINE

## 2023-12-19 PROCEDURE — 3288F FALL RISK ASSESSMENT DOCD: CPT | Mod: CPTII,S$GLB,, | Performed by: INTERNAL MEDICINE

## 2023-12-19 PROCEDURE — 3288F PR FALLS RISK ASSESSMENT DOCUMENTED: ICD-10-PCS | Mod: CPTII,S$GLB,, | Performed by: INTERNAL MEDICINE

## 2023-12-19 PROCEDURE — 1101F PR PT FALLS ASSESS DOC 0-1 FALLS W/OUT INJ PAST YR: ICD-10-PCS | Mod: CPTII,S$GLB,, | Performed by: INTERNAL MEDICINE

## 2023-12-19 PROCEDURE — 3044F PR MOST RECENT HEMOGLOBIN A1C LEVEL <7.0%: ICD-10-PCS | Mod: CPTII,S$GLB,, | Performed by: INTERNAL MEDICINE

## 2023-12-19 PROCEDURE — 99999 PR PBB SHADOW E&M-EST. PATIENT-LVL IV: CPT | Mod: PBBFAC,,, | Performed by: INTERNAL MEDICINE

## 2023-12-19 PROCEDURE — 4010F ACE/ARB THERAPY RXD/TAKEN: CPT | Mod: CPTII,S$GLB,, | Performed by: INTERNAL MEDICINE

## 2023-12-19 PROCEDURE — 99214 PR OFFICE/OUTPT VISIT, EST, LEVL IV, 30-39 MIN: ICD-10-PCS | Mod: S$GLB,,, | Performed by: INTERNAL MEDICINE

## 2023-12-19 RX ORDER — ZOLPIDEM TARTRATE 10 MG/1
10 TABLET ORAL NIGHTLY PRN
Qty: 30 TABLET | Refills: 5 | Status: SHIPPED | OUTPATIENT
Start: 2023-12-19

## 2023-12-19 NOTE — PROGRESS NOTES
[facility]  Primary Care   [address]       Patient ID: William Manuel Jr.     Chief Complaint:   Chief Complaint   Patient presents with    Follow-up     6 month           HPI:  Routine three-month check in for chronic lower back pain.  I saw him last just after a lumbar spine surgery.  Thankfully some of his pain is improving but other pain has taken longer to resolve.  Hopefully things will get better in time.  We were able to wean him down to 1 Percocet every 6 hours as needed.  I brought up the possibility of weaning him to 3 pills a day because I am concerned about medication dependence but he has been on it for very long time and never exhibited signs of withdrawal and asked that I not lower his dosage.  He also takes Percocet for chronic migraines which he is tried numerous medications in the past from numerous specialists and other doctors.  Nothing really worked.  I do think he could have a medication overuse headache and unfortunately the solution is to take less pain pills.  I will leave that to him.  He does need a refill on his Ambien which I have sent in.  I recently refilled Percocet so he has not due for refill just yet.  Vital signs do look good.  He does need labs which will be upcoming and will probably get them a few weeks before we see each other again.  He does need a diabetic eye exam and I placed that referral so hopefully they can get that done sometime soon.    Review of Systems       Back pain   Headache     Objective:      Physical Exam   Physical Exam       Obese     Vitals:   Vitals:    12/19/23 1404   BP: 114/78   Pulse: 71   SpO2: 96%   Weight: 116.8 kg (257 lb 8 oz)   Height: 6' (1.829 m)        Assessment:           Plan:       William Manuel Jr.  was seen today for follow-up and may need lab work.    Diagnoses and all orders for this visit:    William was seen today for follow-up.    Diagnoses and all orders for this visit:    Type 2  diabetes mellitus with other circulatory complication, without long-term current use of insulin  -     Ambulatory referral/consult to Optometry; Future  Monitor Labs     Insomnia due to medical condition  -     zolpidem (AMBIEN) 10 mg Tab; Take 1 tablet (10 mg total) by mouth nightly as needed (insomnia).  Controlled with med     Other headache syndrome  Likely medication overuse Headache     Coronary artery disease involving native coronary artery of native heart without angina pectoris  Stable    Essential hypertension  Controlled with med     Other hyperlipidemia  Monitor Labs     Class 1 obesity due to excess calories with serious comorbidity and body mass index (BMI) of 34.0 to 34.9 in adult  Lost some darrin     Gastroesophageal reflux disease without esophagitis  Controlled     Chronic midline low back pain with right-sided sciatica  Persists   Monitor on Percocet          Zach Almaraz MD

## 2024-01-02 DIAGNOSIS — M54.41 CHRONIC MIDLINE LOW BACK PAIN WITH RIGHT-SIDED SCIATICA: ICD-10-CM

## 2024-01-02 DIAGNOSIS — G89.29 CHRONIC MIDLINE LOW BACK PAIN WITH RIGHT-SIDED SCIATICA: ICD-10-CM

## 2024-01-02 DIAGNOSIS — E29.1 HYPOGONADISM IN MALE: ICD-10-CM

## 2024-01-02 NOTE — TELEPHONE ENCOUNTER
No care due was identified.  Health [facility] Embedded Care Due Messages. Reference number: [id number].   1/02/2024 12:40:08 PM CST

## 2024-01-02 NOTE — TELEPHONE ENCOUNTER
No care due was identified.  Health [facility] Embedded Care Due Messages. Reference number: [id number].   1/02/2024 12:56:28 PM CST

## 2024-01-05 RX ORDER — TESTOSTERONE CYPIONATE 200 MG/ML
INJECTION, SOLUTION INTRAMUSCULAR
Qty: 10 ML | Refills: 3 | Status: SHIPPED | OUTPATIENT
Start: 2024-01-05

## 2024-01-05 RX ORDER — OXYCODONE AND ACETAMINOPHEN 10; 325 MG/1; MG/1
1 TABLET ORAL EVERY 6 HOURS PRN
Qty: 120 TABLET | Refills: 0 | Status: SHIPPED | OUTPATIENT
Start: 2024-01-05 | End: 2024-02-01 | Stop reason: SDUPTHER

## 2024-01-21 DIAGNOSIS — M25.511 ACUTE PAIN OF RIGHT SHOULDER: ICD-10-CM

## 2024-01-21 NOTE — TELEPHONE ENCOUNTER
Care Due:                  Date            Visit Type   Department     Provider  --------------------------------------------------------------------------------                                EP -                              PRIMARY      [facility]  Last Visit: 12-      CARE ([facility])   PRESTON Almaraz                               -                              [facility]  Next Visit: 03-      CARE ([facility])   PRESTON Almaraz                                                            Last  Test          Frequency    Reason                     Performed    Due Date  --------------------------------------------------------------------------------    CBC.........  12 months..  etodolac.................  01- 01-    CMP.........  12 months..  amLODIPine-benazepriL,     02- 01-                             etodolac, glipiZIDE......    HBA1C.......  6 months...  glipiZIDE................  05- 11-    Health [facility] Embedded Care Due Messages. Reference number: [id number].   1/21/2024 3:16:41 AM CST

## 2024-01-22 RX ORDER — ETODOLAC 400 MG/1
TABLET, FILM COATED ORAL
Qty: 270 TABLET | Refills: 3 | Status: SHIPPED | OUTPATIENT
Start: 2024-01-22

## 2024-02-01 DIAGNOSIS — M54.41 CHRONIC MIDLINE LOW BACK PAIN WITH RIGHT-SIDED SCIATICA: ICD-10-CM

## 2024-02-01 DIAGNOSIS — G89.29 CHRONIC MIDLINE LOW BACK PAIN WITH RIGHT-SIDED SCIATICA: ICD-10-CM

## 2024-02-01 NOTE — TELEPHONE ENCOUNTER
No care due was identified.  Health [facility] Embedded Care Due Messages. Reference number: [id number].   2/01/2024 12:35:25 PM CST

## 2024-02-02 RX ORDER — OXYCODONE AND ACETAMINOPHEN 10; 325 MG/1; MG/1
1 TABLET ORAL EVERY 6 HOURS PRN
Qty: 120 TABLET | Refills: 0 | Status: SHIPPED | OUTPATIENT
Start: 2024-02-02 | End: 2024-02-27 | Stop reason: SDUPTHER

## 2024-02-06 RX ORDER — GLIPIZIDE 5 MG/1
TABLET ORAL
Qty: 180 TABLET | Refills: 3 | Status: SHIPPED | OUTPATIENT
Start: 2024-02-06 | End: 2024-04-29

## 2024-02-06 NOTE — TELEPHONE ENCOUNTER
Refill Routing Note   Medication(s) are not appropriate for processing by [facility] for the following reason(s):        Required labs outdated    [facility] action(s):  Defer               Appointments  past 12m or future 3m with PCP    Date Provider   Last Visit   12/19/2023 Zach Almaraz MD   Next Visit   3/26/2024 Zach Almaraz MD   ED visits in past 90 days: 0        Note composed:4:22 AM 02/06/2024

## 2024-02-06 NOTE — TELEPHONE ENCOUNTER
No care due was identified.  Health [facility] Embedded Care Due Messages. Reference number: [id number].   2/05/2024 11:20:29 PM CST

## 2024-02-27 DIAGNOSIS — M54.41 CHRONIC MIDLINE LOW BACK PAIN WITH RIGHT-SIDED SCIATICA: ICD-10-CM

## 2024-02-27 DIAGNOSIS — G89.29 CHRONIC MIDLINE LOW BACK PAIN WITH RIGHT-SIDED SCIATICA: ICD-10-CM

## 2024-02-27 NOTE — TELEPHONE ENCOUNTER
No care due was identified.  Health [facility] Embedded Care Due Messages. Reference number: [id number].   2/27/2024 12:26:56 PM CST

## 2024-02-28 RX ORDER — OXYCODONE AND ACETAMINOPHEN 10; 325 MG/1; MG/1
1 TABLET ORAL EVERY 6 HOURS PRN
Qty: 120 TABLET | Refills: 0 | Status: SHIPPED | OUTPATIENT
Start: 2024-02-28 | End: 2024-03-26 | Stop reason: SDUPTHER

## 2024-03-19 ENCOUNTER — LAB VISIT (OUTPATIENT)
Dept: LAB | Facility: HOSPITAL | Age: 74
End: 2024-03-19
Attending: INTERNAL MEDICINE
Payer: MEDICARE

## 2024-03-19 DIAGNOSIS — E29.1 HYPOGONADISM IN MALE: ICD-10-CM

## 2024-03-19 DIAGNOSIS — E11.59 TYPE 2 DIABETES MELLITUS WITH OTHER CIRCULATORY COMPLICATION, WITHOUT LONG-TERM CURRENT USE OF INSULIN: ICD-10-CM

## 2024-03-19 LAB
ALBUMIN SERPL BCP-MCNC: 4.1 G/DL (ref 3.5–5.2)
ALP SERPL-CCNC: 88 U/L (ref 55–135)
ALT SERPL W/O P-5'-P-CCNC: 10 U/L (ref 10–44)
ANION GAP SERPL CALC-SCNC: 7 MMOL/L (ref 8–16)
AST SERPL-CCNC: 19 U/L (ref 10–40)
BASOPHILS # BLD AUTO: 0.12 K/UL (ref 0–0.2)
BASOPHILS NFR BLD: 1.3 % (ref 0–1.9)
BILIRUB SERPL-MCNC: 0.7 MG/DL (ref 0.1–1)
BUN SERPL-MCNC: 17 MG/DL (ref 8–23)
CALCIUM SERPL-MCNC: 9.8 MG/DL (ref 8.7–10.5)
CHLORIDE SERPL-SCNC: 103 MMOL/L (ref 95–110)
CHOLEST SERPL-MCNC: 107 MG/DL (ref 120–199)
CHOLEST/HDLC SERPL: 2.8 {RATIO} (ref 2–5)
CO2 SERPL-SCNC: 24 MMOL/L (ref 23–29)
CREAT SERPL-MCNC: 1.6 MG/DL (ref 0.5–1.4)
DIFFERENTIAL METHOD BLD: NORMAL
EOSINOPHIL # BLD AUTO: 0.3 K/UL (ref 0–0.5)
EOSINOPHIL NFR BLD: 3.3 % (ref 0–8)
ERYTHROCYTE [DISTWIDTH] IN BLOOD BY AUTOMATED COUNT: 13.2 % (ref 11.5–14.5)
EST. GFR  (NO RACE VARIABLE): 44.9 ML/MIN/1.73 M^2
ESTIMATED AVG GLUCOSE: 180 MG/DL (ref 68–131)
GLUCOSE SERPL-MCNC: 164 MG/DL (ref 70–110)
HBA1C MFR BLD: 7.9 % (ref 4–5.6)
HCT VFR BLD AUTO: 52.3 % (ref 40–54)
HDLC SERPL-MCNC: 38 MG/DL (ref 40–75)
HDLC SERPL: 35.5 % (ref 20–50)
HGB BLD-MCNC: 16.9 G/DL (ref 14–18)
IMM GRANULOCYTES # BLD AUTO: 0.03 K/UL (ref 0–0.04)
IMM GRANULOCYTES NFR BLD AUTO: 0.3 % (ref 0–0.5)
LDLC SERPL CALC-MCNC: 37.8 MG/DL (ref 63–159)
LYMPHOCYTES # BLD AUTO: 2 K/UL (ref 1–4.8)
LYMPHOCYTES NFR BLD: 21.2 % (ref 18–48)
MCH RBC QN AUTO: 29.6 PG (ref 27–31)
MCHC RBC AUTO-ENTMCNC: 32.3 G/DL (ref 32–36)
MCV RBC AUTO: 92 FL (ref 82–98)
MONOCYTES # BLD AUTO: 0.7 K/UL (ref 0.3–1)
MONOCYTES NFR BLD: 7.3 % (ref 4–15)
NEUTROPHILS # BLD AUTO: 6.3 K/UL (ref 1.8–7.7)
NEUTROPHILS NFR BLD: 66.6 % (ref 38–73)
NONHDLC SERPL-MCNC: 69 MG/DL
NRBC BLD-RTO: 0 /100 WBC
PLATELET # BLD AUTO: 233 K/UL (ref 150–450)
PMV BLD AUTO: 11.5 FL (ref 9.2–12.9)
POTASSIUM SERPL-SCNC: 5.1 MMOL/L (ref 3.5–5.1)
PROT SERPL-MCNC: 7.9 G/DL (ref 6–8.4)
RBC # BLD AUTO: 5.71 M/UL (ref 4.6–6.2)
SODIUM SERPL-SCNC: 134 MMOL/L (ref 136–145)
TESTOST SERPL-MCNC: 169 NG/DL (ref 304–1227)
TRIGL SERPL-MCNC: 156 MG/DL (ref 30–150)
WBC # BLD AUTO: 9.47 K/UL (ref 3.9–12.7)

## 2024-03-19 PROCEDURE — 85025 COMPLETE CBC W/AUTO DIFF WBC: CPT | Performed by: INTERNAL MEDICINE

## 2024-03-19 PROCEDURE — 36415 COLL VENOUS BLD VENIPUNCTURE: CPT | Mod: PO | Performed by: INTERNAL MEDICINE

## 2024-03-19 PROCEDURE — 80053 COMPREHEN METABOLIC PANEL: CPT | Performed by: INTERNAL MEDICINE

## 2024-03-19 PROCEDURE — 83036 HEMOGLOBIN GLYCOSYLATED A1C: CPT | Performed by: INTERNAL MEDICINE

## 2024-03-19 PROCEDURE — 80061 LIPID PANEL: CPT | Performed by: INTERNAL MEDICINE

## 2024-03-19 PROCEDURE — 84403 ASSAY OF TOTAL TESTOSTERONE: CPT | Performed by: INTERNAL MEDICINE

## 2024-03-19 NOTE — TELEPHONE ENCOUNTER
No care due was identified.  Health [facility] Embedded Care Due Messages. Reference number: [id number].   3/18/2024 8:01:38 PM CDT

## 2024-03-19 NOTE — TELEPHONE ENCOUNTER
Refill Routing Note   Medication(s) are not appropriate for processing by [facility] for the following reason(s):        Required labs outdated    [facility] action(s):  Defer               Appointments  past 12m or future 3m with PCP    Date Provider   Last Visit   12/19/2023 Zach Almaraz MD   Next Visit   3/26/2024 Zach Almaraz MD   ED visits in past 90 days: 0        Note composed:3:41 PM 03/19/2024

## 2024-03-20 RX ORDER — FUROSEMIDE 40 MG/1
40 TABLET ORAL DAILY
Qty: 90 TABLET | Refills: 3 | Status: SHIPPED | OUTPATIENT
Start: 2024-03-20 | End: 2025-03-20

## 2024-03-26 DIAGNOSIS — M54.41 CHRONIC MIDLINE LOW BACK PAIN WITH RIGHT-SIDED SCIATICA: ICD-10-CM

## 2024-03-26 DIAGNOSIS — G89.29 CHRONIC MIDLINE LOW BACK PAIN WITH RIGHT-SIDED SCIATICA: ICD-10-CM

## 2024-03-26 NOTE — TELEPHONE ENCOUNTER
No care due was identified.  [facility] Embedded Care Due Messages. Reference number: [id number].   3/26/2024 9:33:28 AM CDT

## 2024-03-27 RX ORDER — OXYCODONE AND ACETAMINOPHEN 10; 325 MG/1; MG/1
1 TABLET ORAL EVERY 6 HOURS PRN
Qty: 120 TABLET | Refills: 0 | Status: SHIPPED | OUTPATIENT
Start: 2024-03-27 | End: 2024-04-24 | Stop reason: SDUPTHER

## 2024-04-11 DIAGNOSIS — F41.9 ANXIETY: ICD-10-CM

## 2024-04-11 NOTE — TELEPHONE ENCOUNTER
No care due was identified.  [facility] Embedded Care Due Messages. Reference number: [id number].   4/11/2024 11:57:12 AM CDT

## 2024-04-12 RX ORDER — CLONAZEPAM 0.5 MG/1
0.5 TABLET ORAL 2 TIMES DAILY
Qty: 180 TABLET | Refills: 0 | Status: SHIPPED | OUTPATIENT
Start: 2024-04-12 | End: 2024-07-11

## 2024-04-24 DIAGNOSIS — M54.41 CHRONIC MIDLINE LOW BACK PAIN WITH RIGHT-SIDED SCIATICA: ICD-10-CM

## 2024-04-24 DIAGNOSIS — G89.29 CHRONIC MIDLINE LOW BACK PAIN WITH RIGHT-SIDED SCIATICA: ICD-10-CM

## 2024-04-24 NOTE — TELEPHONE ENCOUNTER
No care due was identified.  [facility] Embedded Care Due Messages. Reference number: [id number].   4/24/2024 11:00:14 AM CDT

## 2024-04-25 ENCOUNTER — PATIENT MESSAGE (OUTPATIENT)
Dept: FAMILY MEDICINE | Facility: CLINIC | Age: 74
End: 2024-04-25
Payer: MEDICARE

## 2024-04-25 RX ORDER — OXYCODONE AND ACETAMINOPHEN 10; 325 MG/1; MG/1
1 TABLET ORAL EVERY 6 HOURS PRN
Qty: 120 TABLET | Refills: 0 | Status: SHIPPED | OUTPATIENT
Start: 2024-04-25 | End: 2024-05-25

## 2024-04-29 ENCOUNTER — LAB VISIT (OUTPATIENT)
Dept: LAB | Facility: HOSPITAL | Age: 74
End: 2024-04-29
Attending: INTERNAL MEDICINE
Payer: MEDICARE

## 2024-04-29 ENCOUNTER — OFFICE VISIT (OUTPATIENT)
Dept: FAMILY MEDICINE | Facility: CLINIC | Age: 74
End: 2024-04-29
Payer: MEDICARE

## 2024-04-29 VITALS
DIASTOLIC BLOOD PRESSURE: 84 MMHG | HEART RATE: 68 BPM | WEIGHT: 257.19 LBS | HEIGHT: 72 IN | OXYGEN SATURATION: 96 % | SYSTOLIC BLOOD PRESSURE: 128 MMHG | BODY MASS INDEX: 34.84 KG/M2

## 2024-04-29 DIAGNOSIS — M54.41 CHRONIC MIDLINE LOW BACK PAIN WITH RIGHT-SIDED SCIATICA: ICD-10-CM

## 2024-04-29 DIAGNOSIS — E11.59 TYPE 2 DIABETES MELLITUS WITH OTHER CIRCULATORY COMPLICATION, WITHOUT LONG-TERM CURRENT USE OF INSULIN: Primary | ICD-10-CM

## 2024-04-29 DIAGNOSIS — G89.29 CHRONIC MIDLINE LOW BACK PAIN WITH RIGHT-SIDED SCIATICA: ICD-10-CM

## 2024-04-29 DIAGNOSIS — I10 ESSENTIAL HYPERTENSION: ICD-10-CM

## 2024-04-29 DIAGNOSIS — I10 ESSENTIAL (PRIMARY) HYPERTENSION: ICD-10-CM

## 2024-04-29 DIAGNOSIS — E11.59 TYPE 2 DIABETES MELLITUS WITH OTHER CIRCULATORY COMPLICATION, WITHOUT LONG-TERM CURRENT USE OF INSULIN: ICD-10-CM

## 2024-04-29 DIAGNOSIS — E66.09 CLASS 1 OBESITY DUE TO EXCESS CALORIES WITH SERIOUS COMORBIDITY AND BODY MASS INDEX (BMI) OF 34.0 TO 34.9 IN ADULT: ICD-10-CM

## 2024-04-29 DIAGNOSIS — N40.0 BENIGN PROSTATIC HYPERPLASIA WITHOUT LOWER URINARY TRACT SYMPTOMS: ICD-10-CM

## 2024-04-29 DIAGNOSIS — G43.009 MIGRAINE WITHOUT AURA AND WITHOUT STATUS MIGRAINOSUS, NOT INTRACTABLE: ICD-10-CM

## 2024-04-29 DIAGNOSIS — M54.16 LUMBAR RADICULOPATHY: ICD-10-CM

## 2024-04-29 DIAGNOSIS — E78.2 MIXED HYPERLIPIDEMIA: ICD-10-CM

## 2024-04-29 PROBLEM — R05.9 COUGH: Status: RESOLVED | Noted: 2021-10-21 | Resolved: 2024-04-29

## 2024-04-29 PROBLEM — I20.0 UNSTABLE ANGINA: Status: RESOLVED | Noted: 2019-05-31 | Resolved: 2024-04-29

## 2024-04-29 PROBLEM — G44.89 OTHER HEADACHE SYNDROME: Status: RESOLVED | Noted: 2020-07-07 | Resolved: 2024-04-29

## 2024-04-29 PROBLEM — R33.8 ACUTE URINARY RETENTION: Status: RESOLVED | Noted: 2018-03-13 | Resolved: 2024-04-29

## 2024-04-29 PROBLEM — R53.83 FATIGUE: Status: RESOLVED | Noted: 2020-07-30 | Resolved: 2024-04-29

## 2024-04-29 LAB
ALBUMIN/CREAT UR: 15.4 UG/MG (ref 0–30)
CREAT UR-MCNC: 117 MG/DL (ref 23–375)
MICROALBUMIN UR DL<=1MG/L-MCNC: 18 UG/ML

## 2024-04-29 PROCEDURE — 3288F FALL RISK ASSESSMENT DOCD: CPT | Mod: CPTII,S$GLB,, | Performed by: INTERNAL MEDICINE

## 2024-04-29 PROCEDURE — 1160F RVW MEDS BY RX/DR IN RCRD: CPT | Mod: CPTII,S$GLB,, | Performed by: INTERNAL MEDICINE

## 2024-04-29 PROCEDURE — 99214 OFFICE O/P EST MOD 30 MIN: CPT | Mod: S$GLB,,, | Performed by: INTERNAL MEDICINE

## 2024-04-29 PROCEDURE — 82043 UR ALBUMIN QUANTITATIVE: CPT | Performed by: INTERNAL MEDICINE

## 2024-04-29 PROCEDURE — 99999 PR PBB SHADOW E&M-EST. PATIENT-LVL IV: CPT | Mod: PBBFAC,,, | Performed by: INTERNAL MEDICINE

## 2024-04-29 PROCEDURE — 3051F HG A1C>EQUAL 7.0%<8.0%: CPT | Mod: CPTII,S$GLB,, | Performed by: INTERNAL MEDICINE

## 2024-04-29 PROCEDURE — 1101F PT FALLS ASSESS-DOCD LE1/YR: CPT | Mod: CPTII,S$GLB,, | Performed by: INTERNAL MEDICINE

## 2024-04-29 PROCEDURE — 3074F SYST BP LT 130 MM HG: CPT | Mod: CPTII,S$GLB,, | Performed by: INTERNAL MEDICINE

## 2024-04-29 PROCEDURE — 1126F AMNT PAIN NOTED NONE PRSNT: CPT | Mod: CPTII,S$GLB,, | Performed by: INTERNAL MEDICINE

## 2024-04-29 PROCEDURE — 1159F MED LIST DOCD IN RCRD: CPT | Mod: CPTII,S$GLB,, | Performed by: INTERNAL MEDICINE

## 2024-04-29 PROCEDURE — 3079F DIAST BP 80-89 MM HG: CPT | Mod: CPTII,S$GLB,, | Performed by: INTERNAL MEDICINE

## 2024-04-29 PROCEDURE — 80326 AMPHETAMINES 5 OR MORE: CPT | Performed by: INTERNAL MEDICINE

## 2024-04-29 PROCEDURE — 80307 DRUG TEST PRSMV CHEM ANLYZR: CPT | Performed by: INTERNAL MEDICINE

## 2024-04-29 PROCEDURE — G2211 COMPLEX E/M VISIT ADD ON: HCPCS | Mod: S$GLB,,, | Performed by: INTERNAL MEDICINE

## 2024-04-29 PROCEDURE — 1157F ADVNC CARE PLAN IN RCRD: CPT | Mod: CPTII,S$GLB,, | Performed by: INTERNAL MEDICINE

## 2024-04-29 RX ORDER — GLIMEPIRIDE 4 MG/1
4 TABLET ORAL
Qty: 90 TABLET | Refills: 3 | Status: SHIPPED | OUTPATIENT
Start: 2024-04-29 | End: 2025-04-29

## 2024-04-29 RX ORDER — TAMSULOSIN HYDROCHLORIDE 0.4 MG/1
1 CAPSULE ORAL DAILY
Qty: 90 CAPSULE | Refills: 3 | Status: SHIPPED | OUTPATIENT
Start: 2024-04-29

## 2024-04-29 RX ORDER — AMLODIPINE AND BENAZEPRIL HYDROCHLORIDE 10; 40 MG/1; MG/1
1 CAPSULE ORAL DAILY
Qty: 90 CAPSULE | Refills: 3 | Status: SHIPPED | OUTPATIENT
Start: 2024-04-29

## 2024-04-29 RX ORDER — METOPROLOL SUCCINATE 100 MG/1
100 TABLET, EXTENDED RELEASE ORAL DAILY
Qty: 90 TABLET | Refills: 3 | Status: SHIPPED | OUTPATIENT
Start: 2024-04-29 | End: 2025-04-29

## 2024-04-29 NOTE — PROGRESS NOTES
[facility]  Primary Care   [address]       Patient ID: William Manuel Jr.     Chief Complaint:   Chief Complaint   Patient presents with    Follow-up     3 month           HPI:  Routine follow-up for chronic lower back pain that we are medicating with oxycodone 10/325 4 times daily as needed.  Not today, but I would like to discuss a safer version of long-term pain control with them probably when we meet again.  Currently though he is having exquisite pain from his left elbow down to his left middle 2 fingers that is starting 2 weeks ago.  He has already been in contact with the surgeon that did surgery in his neck and he has an MRI of the cervical spine scheduled on May 6.  I wonder if there is any elbow pathology causing a ulnar nerve entrapment.  If the MRI does not show anything I want him to let me know because I may refer him to hand surgery.  Vital signs do look good.  Labs reviewed in his A1c is elevated over baseline at 7.9.  He admits to not being very good at checking his home glucoses and currently only takes 5 mg of glipizide each day.  I am going to switch him to glimepiride 4 mg in the morning and add Invokana 10 mg a day with instructions to dry himself well after he urinates.  Labs also indicated dehydration and he does take Lasix.  With the Invokana, he may not need the Lasix.  He is due for a he microalbumin test and a urine drug test so we will get that today.  I did take the time to renew some medicines that it  as well.  Thankfully his LDL cholesterol is controlled.    Review of Systems       Left Upper Extremity pain     Objective:      Physical Exam   Physical Exam       Obese    Vitals:   Vitals:    24 1023   BP: 128/84   Pulse: 68   SpO2: 96%   Weight: 116.7 kg (257 lb 2.7 oz)   Height: 6' (1.829 m)        Assessment:           Plan:       William Manuel Jr.  was seen today for follow-up and may need lab work.    Diagnoses and all  orders for this visit:    William was seen today for follow-up.    Diagnoses and all orders for this visit:    Type 2 diabetes mellitus with other circulatory complication, without long-term current use of insulin  -     Ambulatory referral/consult to Optometry; Future  -     Microalbumin/Creatinine Ratio, Urine; Future  -     glimepiride (AMARYL) 4 MG tablet; Take 1 tablet (4 mg total) by mouth before breakfast.  -     empagliflozin (JARDIANCE) 10 mg tablet; Take 1 tablet (10 mg total) by mouth once daily.  Almost uncontrolled with a hemoglobin A1c of 7.9.  Changing medicines as above and refer to Optometry for diabetic eye exam.  Repeat A1c 3 months.    Chronic midline low back pain with right-sided sciatica  -     Pain Clinic Drug Screen; Future  Mostly controlled with Percocet, but will discuss long-term pain control at our next office visit.    Essential (primary) hypertension  -     amLODIPine-benazepriL (LOTREL) 10-40 mg per capsule; Take 1 capsule by mouth once daily.  -     metoprolol succinate (TOPROL-XL) 100 MG 24 hr tablet; Take 1 tablet (100 mg total) by mouth once daily.  Controlled medications    Benign prostatic hyperplasia without lower urinary tract symptoms  -     tamsulosin (FLOMAX) 0.4 mg Cap; Take 1 capsule (0.4 mg total) by mouth once daily.  Controlled medication    Class 1 obesity due to excess calories with serious comorbidity and body mass index (BMI) of 34.0 to 34.9 in adult  Weight has been stable    Essential hypertension  As above  Mixed hyperlipidemia  LDL is at goal  Lumbar radiculopathy  Continue oxycodone discuss better pain management next time    Migraine without aura and without status migrainosus, not intractable  Controlled medication     Dehydration-please hydrate better and you may not need Lasix with the addition of the Jardiance.    Visit today included increased complexity associated with the care of the episodic problem Diabetes and hypertension addressed and managing the  longitudinal care of the patient due to the serious and/or complex managed problem(s) .    Zach Almaraz MD

## 2024-05-04 LAB
1OH-MIDAZOLAM UR QL SCN: NOT DETECTED
6MAM UR QL: NOT DETECTED
7AMINOCLONAZEPAM UR QL: PRESENT
A-OH ALPRAZ UR QL: NOT DETECTED
ALPRAZ UR QL: NOT DETECTED
AMPHET UR QL SCN: NOT DETECTED
ANNOTATION COMMENT IMP: NORMAL
BARBITURATES UR QL: NEGATIVE
BUPRENORPHINE UR QL: NOT DETECTED
BZE UR QL: NEGATIVE
CARBOXYTHC UR QL: NORMAL
CARISOPRODOL UR QL: NEGATIVE
CLONAZEPAM UR QL: NOT DETECTED
CODEINE UR QL: NOT DETECTED
CREAT UR-MCNC: 128.3 MG/DL (ref 20–400)
DIAZEPAM UR QL: NOT DETECTED
ETHYL GLUCURONIDE UR QL: NEGATIVE
FENTANYL UR QL: NOT DETECTED
GABAPENTIN: PRESENT
HYDROCODONE UR QL: NOT DETECTED
HYDROMORPHONE UR QL: PRESENT
LORAZEPAM UR QL: NOT DETECTED
MDA UR QL: NOT DETECTED
MDEA UR QL: NOT DETECTED
MDMA UR QL: NOT DETECTED
ME-PHENIDATE UR QL: NOT DETECTED
METHADONE UR QL: NEGATIVE
METHAMPHET UR QL: NOT DETECTED
MIDAZOLAM UR QL SCN: NOT DETECTED
MORPHINE UR QL: NOT DETECTED
NALOXONE: NOT DETECTED
NORBUPRENORPHINE UR QL CFM: NOT DETECTED
NORDIAZEPAM UR QL: NOT DETECTED
NORFENTANYL UR QL: NOT DETECTED
NORHYDROCODONE UR QL CFM: NOT DETECTED
NORMEPERIDINE UR QL CFM: NOT DETECTED
NOROXYCODONE UR QL CFM: PRESENT
NOROXYMORPHONE UR QL SCN: PRESENT
OXAZEPAM UR QL: NOT DETECTED
OXYCODONE UR QL: PRESENT
OXYMORPHONE UR QL: PRESENT
PATHOLOGY STUDY: NORMAL
PCP UR QL: NEGATIVE
PHENTERMINE UR QL: NOT DETECTED
PREGABALIN: NOT DETECTED
SERVICE CMNT-IMP: NORMAL
TAPENTADOL UR QL SCN: NOT DETECTED
TAPENTADOL UR QL SCN: NOT DETECTED
TEMAZEPAM UR QL: NOT DETECTED
TRAMADOL UR QL: NEGATIVE
ZOLPIDEM PHENYL-4-CARB UR QL SCN: PRESENT
ZOLPIDEM UR QL: PRESENT

## 2024-05-08 DIAGNOSIS — G47.01 INSOMNIA DUE TO MEDICAL CONDITION: ICD-10-CM

## 2024-05-08 NOTE — TELEPHONE ENCOUNTER
No care due was identified.  Health [facility] Embedded Care Due Messages. Reference number: [id number].   5/08/2024 11:10:01 AM CDT

## 2024-05-09 RX ORDER — ZOLPIDEM TARTRATE 10 MG/1
TABLET ORAL
Qty: 30 TABLET | Refills: 5 | Status: SHIPPED | OUTPATIENT
Start: 2024-05-09

## 2024-05-14 ENCOUNTER — PATIENT MESSAGE (OUTPATIENT)
Dept: FAMILY MEDICINE | Facility: CLINIC | Age: 74
End: 2024-05-14
Payer: MEDICARE

## 2024-05-28 ENCOUNTER — PATIENT MESSAGE (OUTPATIENT)
Dept: FAMILY MEDICINE | Facility: CLINIC | Age: 74
End: 2024-05-28
Payer: MEDICARE

## 2024-05-28 DIAGNOSIS — G89.29 CHRONIC MIDLINE LOW BACK PAIN WITH RIGHT-SIDED SCIATICA: Primary | ICD-10-CM

## 2024-05-28 DIAGNOSIS — M54.41 CHRONIC MIDLINE LOW BACK PAIN WITH RIGHT-SIDED SCIATICA: Primary | ICD-10-CM

## 2024-06-01 DIAGNOSIS — G47.01 INSOMNIA DUE TO MEDICAL CONDITION: ICD-10-CM

## 2024-06-01 RX ORDER — ZOLPIDEM TARTRATE 10 MG/1
TABLET ORAL
Qty: 30 TABLET | Refills: 5 | OUTPATIENT
Start: 2024-06-01

## 2024-06-01 NOTE — TELEPHONE ENCOUNTER
No care due was identified.  [facility] Embedded Care Due Messages. Reference number: [id number].   6/01/2024 9:04:32 AM CDT   [Left Leg] : left leg [Gradual] : gradual [3] : 3 [1] : 2 [Localized] : localized [Sharp] : sharp [Tightness] : tightness [Intermittent] : intermittent [Physical therapy] : physical therapy [Walking] : walking [de-identified] : pt presents here today with left knee pain \par pt has try physical therapy with some improvement and ROM is better [FreeTextEntry1] : knee [FreeTextEntry5] : skiing accident  [de-identified] : physical therapy

## 2024-06-02 RX ORDER — OXYCODONE AND ACETAMINOPHEN 10; 325 MG/1; MG/1
1 TABLET ORAL EVERY 6 HOURS PRN
Qty: 120 TABLET | Refills: 0 | Status: SHIPPED | OUTPATIENT
Start: 2024-06-02 | End: 2024-07-02

## 2024-06-25 ENCOUNTER — OFFICE VISIT (OUTPATIENT)
Dept: FAMILY MEDICINE | Facility: CLINIC | Age: 74
End: 2024-06-25
Payer: MEDICARE

## 2024-06-25 VITALS
OXYGEN SATURATION: 95 % | SYSTOLIC BLOOD PRESSURE: 136 MMHG | WEIGHT: 261 LBS | BODY MASS INDEX: 35.35 KG/M2 | DIASTOLIC BLOOD PRESSURE: 88 MMHG | HEART RATE: 89 BPM | HEIGHT: 72 IN

## 2024-06-25 DIAGNOSIS — G89.29 CHRONIC MIDLINE LOW BACK PAIN WITH RIGHT-SIDED SCIATICA: Primary | ICD-10-CM

## 2024-06-25 DIAGNOSIS — M54.41 CHRONIC MIDLINE LOW BACK PAIN WITH RIGHT-SIDED SCIATICA: Primary | ICD-10-CM

## 2024-06-25 DIAGNOSIS — E66.01 CLASS 2 SEVERE OBESITY DUE TO EXCESS CALORIES WITH SERIOUS COMORBIDITY AND BODY MASS INDEX (BMI) OF 35.0 TO 35.9 IN ADULT: ICD-10-CM

## 2024-06-25 DIAGNOSIS — I10 ESSENTIAL HYPERTENSION: ICD-10-CM

## 2024-06-25 DIAGNOSIS — G43.009 MIGRAINE WITHOUT AURA AND WITHOUT STATUS MIGRAINOSUS, NOT INTRACTABLE: ICD-10-CM

## 2024-06-25 PROCEDURE — 99999 PR PBB SHADOW E&M-EST. PATIENT-LVL IV: CPT | Mod: PBBFAC,,, | Performed by: INTERNAL MEDICINE

## 2024-06-25 PROCEDURE — 3066F NEPHROPATHY DOC TX: CPT | Mod: CPTII,S$GLB,, | Performed by: INTERNAL MEDICINE

## 2024-06-25 PROCEDURE — 3079F DIAST BP 80-89 MM HG: CPT | Mod: CPTII,S$GLB,, | Performed by: INTERNAL MEDICINE

## 2024-06-25 PROCEDURE — 3008F BODY MASS INDEX DOCD: CPT | Mod: CPTII,S$GLB,, | Performed by: INTERNAL MEDICINE

## 2024-06-25 PROCEDURE — 3061F NEG MICROALBUMINURIA REV: CPT | Mod: CPTII,S$GLB,, | Performed by: INTERNAL MEDICINE

## 2024-06-25 PROCEDURE — 4010F ACE/ARB THERAPY RXD/TAKEN: CPT | Mod: CPTII,S$GLB,, | Performed by: INTERNAL MEDICINE

## 2024-06-25 PROCEDURE — 1101F PT FALLS ASSESS-DOCD LE1/YR: CPT | Mod: CPTII,S$GLB,, | Performed by: INTERNAL MEDICINE

## 2024-06-25 PROCEDURE — 1159F MED LIST DOCD IN RCRD: CPT | Mod: CPTII,S$GLB,, | Performed by: INTERNAL MEDICINE

## 2024-06-25 PROCEDURE — 1160F RVW MEDS BY RX/DR IN RCRD: CPT | Mod: CPTII,S$GLB,, | Performed by: INTERNAL MEDICINE

## 2024-06-25 PROCEDURE — G2211 COMPLEX E/M VISIT ADD ON: HCPCS | Mod: S$GLB,,, | Performed by: INTERNAL MEDICINE

## 2024-06-25 PROCEDURE — 3075F SYST BP GE 130 - 139MM HG: CPT | Mod: CPTII,S$GLB,, | Performed by: INTERNAL MEDICINE

## 2024-06-25 PROCEDURE — 3051F HG A1C>EQUAL 7.0%<8.0%: CPT | Mod: CPTII,S$GLB,, | Performed by: INTERNAL MEDICINE

## 2024-06-25 PROCEDURE — 1157F ADVNC CARE PLAN IN RCRD: CPT | Mod: CPTII,S$GLB,, | Performed by: INTERNAL MEDICINE

## 2024-06-25 PROCEDURE — 1125F AMNT PAIN NOTED PAIN PRSNT: CPT | Mod: CPTII,S$GLB,, | Performed by: INTERNAL MEDICINE

## 2024-06-25 PROCEDURE — 3288F FALL RISK ASSESSMENT DOCD: CPT | Mod: CPTII,S$GLB,, | Performed by: INTERNAL MEDICINE

## 2024-06-25 PROCEDURE — 99214 OFFICE O/P EST MOD 30 MIN: CPT | Mod: S$GLB,,, | Performed by: INTERNAL MEDICINE

## 2024-06-25 RX ORDER — OXYCODONE AND ACETAMINOPHEN 10; 325 MG/1; MG/1
1 TABLET ORAL EVERY 6 HOURS PRN
Qty: 60 TABLET | Refills: 0 | Status: SHIPPED | OUTPATIENT
Start: 2024-06-25 | End: 2024-07-10

## 2024-06-25 NOTE — PROGRESS NOTES
[facility]  Primary Care   [address]       Patient ID: William Chávezshelby Jr.     Chief Complaint:   Chief Complaint   Patient presents with    Follow-up     Discuss pain medication           HPI:  Routine follow-up for pain control.  He has an upcoming EMG to determine where the nerve impingement is in his left upper extremity and I told him that I will not be the most comfortable procedures but I really think he needs it in order to get some kind of surgical intervention so it was hand is on fire.  He also has significant right shoulder degeneration and they are discussing a total reverse shoulder replacement but we will put that in the back burner for now because he does need a new another surgery in his lower back that will correct a minor thing.  So the overall thing is that he needs a lot of surgery upcoming.  I am concerned that his pain is not being very well-controlled with Percocet.  Admittedly he has been on for many years.  His urine drug screen did show some Dilaudid and he admits to taking some to try to get better pain control.  I really do think transitioning to Suboxone is a better long-term pain control solution because we minimize the side effects of overmedication intolerance.  He was agreeable to try it.  I am going to give him 2 weeks Percocet for now and I will discuss with 1 of my colleagues the best dosing of the Suboxone and message him with the details.  We will have short-term follow-up in 4-6 weeks to see how we are doing and I do wish him well with a upcoming surgeries. Migraines have been an issue and it could be due to opioids.     Review of Systems       Various pains     Objective:      Physical Exam   Physical Exam       Obese   Left Upper Extremity brace     Vitals:   Vitals:    06/25/24 1344   BP: 136/88   Pulse: 89   SpO2: 95%   Weight: 118.4 kg (261 lb 0.4 oz)   Height: 6' (1.829 m)        Assessment:           Plan:       William Manuel    was seen today for follow-up and may need lab work.    Diagnoses and all orders for this visit:    William was seen today for follow-up.    Diagnoses and all orders for this visit:    Chronic midline low back pain with right-sided sciatica  -     oxyCODONE-acetaminophen (PERCOCET)  mg per tablet; Take 1 tablet by mouth every 6 (six) hours as needed for Pain.  Will try to transition to Suboxone     Migraine without aura and without status migrainosus, not intractable  Monitor on Suboxone     Essential hypertension  Controlled with med     Class 2 severe obesity due to excess calories with serious comorbidity and body mass index (BMI) of 35.0 to 35.9 in adult  Monitor     Visit today included increased complexity associated with the care of the episodic problem Chronic Pain hypertension Obesity  addressed and managing the longitudinal care of the patient due to the serious and/or complex managed problem(s) .           Zach Almaraz MD

## 2024-06-26 ENCOUNTER — PATIENT MESSAGE (OUTPATIENT)
Dept: FAMILY MEDICINE | Facility: CLINIC | Age: 74
End: 2024-06-26
Payer: MEDICARE

## 2024-06-29 ENCOUNTER — PATIENT MESSAGE (OUTPATIENT)
Dept: FAMILY MEDICINE | Facility: CLINIC | Age: 74
End: 2024-06-29
Payer: MEDICARE

## 2024-06-29 DIAGNOSIS — M54.41 CHRONIC MIDLINE LOW BACK PAIN WITH RIGHT-SIDED SCIATICA: Primary | ICD-10-CM

## 2024-06-29 DIAGNOSIS — G89.29 CHRONIC MIDLINE LOW BACK PAIN WITH RIGHT-SIDED SCIATICA: Primary | ICD-10-CM

## 2024-07-06 RX ORDER — OXYCODONE AND ACETAMINOPHEN 10; 325 MG/1; MG/1
1 TABLET ORAL EVERY 6 HOURS PRN
Qty: 120 TABLET | Refills: 0 | Status: SHIPPED | OUTPATIENT
Start: 2024-07-06 | End: 2024-08-05

## 2024-07-06 NOTE — TELEPHONE ENCOUNTER
No care due was identified.  Health [facility] Embedded Care Due Messages. Reference number: [id number].   7/06/2024 6:18:52 AM CDT

## 2024-07-08 ENCOUNTER — TELEPHONE (OUTPATIENT)
Dept: FAMILY MEDICINE | Facility: CLINIC | Age: 74
End: 2024-07-08
Payer: MEDICARE

## 2024-07-08 DIAGNOSIS — G89.29 CHRONIC MIDLINE LOW BACK PAIN WITH RIGHT-SIDED SCIATICA: ICD-10-CM

## 2024-07-08 DIAGNOSIS — E11.59 TYPE 2 DIABETES MELLITUS WITH OTHER CIRCULATORY COMPLICATION, WITHOUT LONG-TERM CURRENT USE OF INSULIN: ICD-10-CM

## 2024-07-08 DIAGNOSIS — M54.41 CHRONIC MIDLINE LOW BACK PAIN WITH RIGHT-SIDED SCIATICA: ICD-10-CM

## 2024-07-08 DIAGNOSIS — Z01.818 PRE-OP EVALUATION: Primary | ICD-10-CM

## 2024-07-08 RX ORDER — OXYCODONE AND ACETAMINOPHEN 10; 325 MG/1; MG/1
1 TABLET ORAL EVERY 6 HOURS PRN
Qty: 120 TABLET | Refills: 0 | Status: SHIPPED | OUTPATIENT
Start: 2024-07-08 | End: 2024-08-07

## 2024-07-08 RX ORDER — OXYCODONE AND ACETAMINOPHEN 10; 325 MG/1; MG/1
1 TABLET ORAL EVERY 6 HOURS PRN
Qty: 120 TABLET | Refills: 0 | Status: CANCELLED | OUTPATIENT
Start: 2024-07-08 | End: 2024-08-07

## 2024-07-08 NOTE — TELEPHONE ENCOUNTER
No care due was identified.  [facility] Embedded Care Due Messages. Reference number: [id number].   7/08/2024 1:57:05 PM CDT

## 2024-07-08 NOTE — TELEPHONE ENCOUNTER
Spoke with patient in regards to his labs,CXR, and EKG. Informed patient that he needs to complete this before he can have surgery. Patient understood and stated he will give me a call right back.

## 2024-07-08 NOTE — TELEPHONE ENCOUNTER
Spoke with patient in regards to scheduling his EKG,labs,and CXR. Patient was scheduled for 7/10 at 8:00 am for a nurse visit.

## 2024-07-10 ENCOUNTER — CLINICAL SUPPORT (OUTPATIENT)
Dept: FAMILY MEDICINE | Facility: CLINIC | Age: 74
End: 2024-07-10
Payer: MEDICARE

## 2024-07-10 ENCOUNTER — HOSPITAL ENCOUNTER (OUTPATIENT)
Dept: RADIOLOGY | Facility: HOSPITAL | Age: 74
Discharge: HOME | End: 2024-07-10
Attending: INTERNAL MEDICINE
Payer: MEDICARE

## 2024-07-10 VITALS — RESPIRATION RATE: 18 BRPM

## 2024-07-10 DIAGNOSIS — Z01.818 PRE-OP EVALUATION: ICD-10-CM

## 2024-07-10 LAB
OHS QRS DURATION: 84 MS
OHS QTC CALCULATION: 408 MS

## 2024-07-10 PROCEDURE — 93010 ELECTROCARDIOGRAM REPORT: CPT | Mod: S$GLB,,, | Performed by: INTERNAL MEDICINE

## 2024-07-10 PROCEDURE — 93005 ELECTROCARDIOGRAM TRACING: CPT | Mod: S$GLB,,, | Performed by: INTERNAL MEDICINE

## 2024-07-10 PROCEDURE — 71046 X-RAY EXAM CHEST 2 VIEWS: CPT | Mod: TC,FY,PO

## 2024-07-10 PROCEDURE — 99499 UNLISTED E&M SERVICE: CPT | Mod: S$GLB,,, | Performed by: INTERNAL MEDICINE

## 2024-07-10 PROCEDURE — 71046 X-RAY EXAM CHEST 2 VIEWS: CPT | Mod: 26,,, | Performed by: RADIOLOGY

## 2024-07-10 NOTE — PROGRESS NOTES
Pt. Seen in clinic for pre op ecg.  ECG results reviewed by Dr. Almaraz, with clearance given, prior to pt. Leaving appt.

## 2024-07-15 ENCOUNTER — TELEPHONE (OUTPATIENT)
Dept: FAMILY MEDICINE | Facility: CLINIC | Age: 74
End: 2024-07-15
Payer: MEDICARE

## 2024-07-15 ENCOUNTER — PATIENT MESSAGE (OUTPATIENT)
Dept: FAMILY MEDICINE | Facility: CLINIC | Age: 74
End: 2024-07-15
Payer: MEDICARE

## 2024-07-15 NOTE — TELEPHONE ENCOUNTER
----- Message from Rose Rivers sent at 7/15/2024 12:36 PM CDT -----  Regarding: Needs call back before end of day  Type: Needs Medical Advice  Who Called:  Tyra Juarez Call Back Number: [phone]      Additional Information: Surgeon in [place] has not received the clearance for patient to get his surgery tomorrow, they need to talk to someone today regarding this or he will not be able to do his surgery tomorrow      Please fax clearance to [phone]   [facility]  [address], [address]  [place]  [address]    Group Progress Note  [facility] - Progress Note    Date of Service: 3/14/24   Name: Ramin Murcia  : 1955  Medical Record Number: [id number]  START TIME:  4:00 PM  END TIME:  5:45 PM  FACILITATOR(S): Mason Adkins, PhD; Dior Baltazar  TOPIC: [facility] Group Therapy  Type of Session: Group  :  Number of Attendees:  8  Number of Minutes:  105    SERVICE MODALITY:  Video Visit:      Provider verified identity through the following two step process.  Patient provided:  Patient is known previously to provider and Patient was verified at admission/transfer    Telemedicine Visit: The patient's condition can be safely assessed and treated via synchronous audio and visual telemedicine encounter.      Reason for Telemedicine Visit: Patient has requested telehealth visit    Originating Site (Patient Location): Patient's home    Distant Site (Provider Location): [facility]    Consent:  The patient/guardian has verbally consented to: the potential risks and benefits of telemedicine (video visit) versus in person care; bill my insurance or make self-payment for services provided; and responsibility for payment of non-covered services.     Patient would like the video invitation sent by:  My Chart    Mode of Communication:  Video Conference via Medical Zoom    Distant Location (Provider):  On-site    As the provider I attest to compliance with applicable laws and regulations related to telemedicine.        DSM-5 Diagnoses:  Encounter Diagnoses   Name Primary?     Unspecified disruptive, impulse control, and conduct disorder (hypersexuality) Yes     Anxiety disorder, unspecified type      Alcohol use          Current Reported Symptoms and Status update:  Changes since last session includes no boundary violations, improvement in anxiety, reduction in alcohol use.    Patient's  problems with out of control, driven, impulsive, compulsive sexual behavior began in early 2020. He has been in a stable loving marriage for 40 years then began having covert hook-ups with young men.  Efforts to change problematic behaviors have included several years of psychotherapy and attenance in [facility]. He continues to struggle with recurrent and intense urges which cause him distress.     Client reports experiencing some anxiety/worry and associated distress. He attributes distress to ineffective coping, patterns of avoidance/numbing, and difficulties with functioning in relationships.      Progress Toward Treatment Goals:   Satisfactory progress     Therapeutic Interventions/Treatment Strategies:    Area(s) of treatment focus addressed in this session included Symptom Management, Interpersonal Relationship Skills and Sexual Health and Wellness    Group members shared updates in treatment progress, relationships, coping, and sexual health. Members also provided updates on homework progress. Today was a group member s ending of treatment. The group session focused on members providing him feedback about treatment progress and future hopes. Members also received feedback from the person ending treatment about their future treatment steps and progress. Members reflected on the importance of their relationships with each other in supporting their treatment progress.  Psychotherapist offered support, feedback and validation and reinforced use of skills Treatment modalities used include [facility] THERAPY INTERVENTIONS: Cognitive Behavioral Therapy  Support and Feedback    Patient Response:   Patient responded to session by accepting feedback, giving feedback, listening, focusing on goals and accepting support  Possible barriers to participation / learning include: N/A    Current Mental Status Exam:   Appearance:  Appropriate   Eye Contact:  Good   Attitude / Demeanor: Cooperative   Speech      Rate / Production: Normal/  Responsive      Volume:  Normal  volume  Orientation:  All  Mood:   Normal  Affect:   Appropriate   Thought Content: Clear   Insight:   Good       Plan/Need for Future Services:  Return for therapy in 1 week to treat diagnosed problems.    Patient has a current master individualized treatment plan.  See Epic treatment plan for more information.    Referral / Collaboration:  Referral to another professional/service is not indicated at this time..  Emergency Services Needed?  No    Assignment:  Continue working on relationship work    Interactive Complexity:  There are four specific communication difficulties that complicate the work of the primary psychiatric procedure.  Interactive complexity (+45057) may be reported when at least one of these difficulties is present.    Communication difficulties present during current the psychiatric procedure include:  1. None.      Signature/Title:      Mason Adkins, PhD, LP    [facility]  Department of Family Medicine and Community Health  [employer]

## 2024-07-15 NOTE — TELEPHONE ENCOUNTER
Spoke with patient spouse in regards to the patient surgery clearance. Informed her that I have faxed the paperwork to [phone]. Patient spouse understood.

## 2024-07-16 ENCOUNTER — PATIENT MESSAGE (OUTPATIENT)
Dept: FAMILY MEDICINE | Facility: CLINIC | Age: 74
End: 2024-07-16
Payer: MEDICARE

## 2024-07-16 DIAGNOSIS — R79.89 HIGH SERUM CREATININE: Primary | ICD-10-CM

## 2024-07-24 DIAGNOSIS — M25.511 ACUTE PAIN OF RIGHT SHOULDER: ICD-10-CM

## 2024-07-25 RX ORDER — ETODOLAC 400 MG/1
400 TABLET, FILM COATED ORAL 3 TIMES DAILY
Qty: 90 TABLET | Refills: 5 | Status: SHIPPED | OUTPATIENT
Start: 2024-07-25 | End: 2025-01-21

## 2024-07-25 NOTE — TELEPHONE ENCOUNTER
No care due was identified.  [facility] Embedded Care Due Messages. Reference number: [id number].   7/24/2024 9:42:10 PM CDT

## 2024-07-29 ENCOUNTER — OFFICE VISIT (OUTPATIENT)
Dept: FAMILY MEDICINE | Facility: CLINIC | Age: 74
End: 2024-07-29
Payer: MEDICARE

## 2024-07-29 VITALS
SYSTOLIC BLOOD PRESSURE: 132 MMHG | WEIGHT: 259.38 LBS | HEART RATE: 60 BPM | DIASTOLIC BLOOD PRESSURE: 78 MMHG | BODY MASS INDEX: 35.18 KG/M2 | OXYGEN SATURATION: 96 %

## 2024-07-29 DIAGNOSIS — G56.02 CARPAL TUNNEL SYNDROME OF LEFT WRIST: Primary | ICD-10-CM

## 2024-07-29 DIAGNOSIS — I10 ESSENTIAL HYPERTENSION: ICD-10-CM

## 2024-07-29 DIAGNOSIS — E66.01 CLASS 2 SEVERE OBESITY DUE TO EXCESS CALORIES WITH SERIOUS COMORBIDITY AND BODY MASS INDEX (BMI) OF 35.0 TO 35.9 IN ADULT: ICD-10-CM

## 2024-07-29 DIAGNOSIS — M54.41 CHRONIC MIDLINE LOW BACK PAIN WITH RIGHT-SIDED SCIATICA: ICD-10-CM

## 2024-07-29 DIAGNOSIS — G89.29 CHRONIC MIDLINE LOW BACK PAIN WITH RIGHT-SIDED SCIATICA: ICD-10-CM

## 2024-07-29 PROCEDURE — 3066F NEPHROPATHY DOC TX: CPT | Mod: CPTII,S$GLB,, | Performed by: INTERNAL MEDICINE

## 2024-07-29 PROCEDURE — 1160F RVW MEDS BY RX/DR IN RCRD: CPT | Mod: CPTII,S$GLB,, | Performed by: INTERNAL MEDICINE

## 2024-07-29 PROCEDURE — 4010F ACE/ARB THERAPY RXD/TAKEN: CPT | Mod: CPTII,S$GLB,, | Performed by: INTERNAL MEDICINE

## 2024-07-29 PROCEDURE — 3061F NEG MICROALBUMINURIA REV: CPT | Mod: CPTII,S$GLB,, | Performed by: INTERNAL MEDICINE

## 2024-07-29 PROCEDURE — 1157F ADVNC CARE PLAN IN RCRD: CPT | Mod: CPTII,S$GLB,, | Performed by: INTERNAL MEDICINE

## 2024-07-29 PROCEDURE — 99213 OFFICE O/P EST LOW 20 MIN: CPT | Mod: S$GLB,,, | Performed by: INTERNAL MEDICINE

## 2024-07-29 PROCEDURE — 1101F PT FALLS ASSESS-DOCD LE1/YR: CPT | Mod: CPTII,S$GLB,, | Performed by: INTERNAL MEDICINE

## 2024-07-29 PROCEDURE — 3075F SYST BP GE 130 - 139MM HG: CPT | Mod: CPTII,S$GLB,, | Performed by: INTERNAL MEDICINE

## 2024-07-29 PROCEDURE — 3078F DIAST BP <80 MM HG: CPT | Mod: CPTII,S$GLB,, | Performed by: INTERNAL MEDICINE

## 2024-07-29 PROCEDURE — 99999 PR PBB SHADOW E&M-EST. PATIENT-LVL III: CPT | Mod: PBBFAC,,, | Performed by: INTERNAL MEDICINE

## 2024-07-29 PROCEDURE — 1159F MED LIST DOCD IN RCRD: CPT | Mod: CPTII,S$GLB,, | Performed by: INTERNAL MEDICINE

## 2024-07-29 PROCEDURE — 3288F FALL RISK ASSESSMENT DOCD: CPT | Mod: CPTII,S$GLB,, | Performed by: INTERNAL MEDICINE

## 2024-07-29 PROCEDURE — 3051F HG A1C>EQUAL 7.0%<8.0%: CPT | Mod: CPTII,S$GLB,, | Performed by: INTERNAL MEDICINE

## 2024-07-29 PROCEDURE — 3008F BODY MASS INDEX DOCD: CPT | Mod: CPTII,S$GLB,, | Performed by: INTERNAL MEDICINE

## 2024-07-29 RX ORDER — OXYCODONE AND ACETAMINOPHEN 10; 325 MG/1; MG/1
1 TABLET ORAL EVERY 6 HOURS PRN
Qty: 120 TABLET | Refills: 0 | Status: SHIPPED | OUTPATIENT
Start: 2024-07-29 | End: 2024-08-28

## 2024-07-29 NOTE — PROGRESS NOTES
[facility]  Primary Care   [address]       Patient ID: William Mnauel Jr.     Chief Complaint:   Chief Complaint   Patient presents with    Follow-up        HPI:  Follow-up for pain that unfortunately is been exacerbated because he had a left carpal tunnel release.  He still does have intense burning in his middle 2 fingers.  Per his admission, the surgeon noticed a lot of impingement and nerve damage and it was instructed that he could take up to a year for the nerve pain to go away.  He has tried gabapentin in the past but it gave him extreme confusion.  We have him on Percocet 10/325 4 times a day and he has had to take a few extra doses between the surgery now so he is about 6 days short.  I will refill it today with instructions of the pharmacy to fill it early as I know the situation.    Review of Systems       Hand pain    Objective:      Physical Exam   Physical Exam       Obese    Vitals:   Vitals:    07/29/24 1534   BP: 132/78   Pulse: 60   SpO2: 96%   Weight: 117.7 kg (259 lb 5.9 oz)        Assessment:           Plan:       William Manuel Jr.  was seen today for follow-up and may need lab work.    Diagnoses and all orders for this visit:    William was seen today for follow-up.    Diagnoses and all orders for this visit:    Carpal tunnel syndrome of left wrist  S/p CTR but it could take up to a year for his neuropathy to improve     Chronic midline low back pain with right-sided sciatica  -     oxyCODONE-acetaminophen (PERCOCET)  mg per tablet; Take 1 tablet by mouth every 6 (six) hours as needed for Pain.  Continue Percocet for now. Eventually I want to transition to Buprenorphine   May get minimally invasive surgery soon     Essential hypertension  Controlled with med     Class 2 severe obesity due to excess calories with serious comorbidity and body mass index (BMI) of 35.0 to 35.9 in adult  Monitor weight          Zach Almaraz MD

## 2024-08-01 ENCOUNTER — PATIENT MESSAGE (OUTPATIENT)
Dept: FAMILY MEDICINE | Facility: CLINIC | Age: 74
End: 2024-08-01
Payer: MEDICARE

## 2024-08-01 DIAGNOSIS — N52.9 ERECTILE DYSFUNCTION, UNSPECIFIED ERECTILE DYSFUNCTION TYPE: Primary | ICD-10-CM

## 2024-08-01 DIAGNOSIS — N52.1 ERECTILE DYSFUNCTION DUE TO DISEASES CLASSIFIED ELSEWHERE: ICD-10-CM

## 2024-08-04 RX ORDER — SILDENAFIL 100 MG/1
100 TABLET, FILM COATED ORAL DAILY PRN
Qty: 10 TABLET | Refills: 5 | Status: SHIPPED | OUTPATIENT
Start: 2024-08-04 | End: 2025-08-04

## 2024-08-18 DIAGNOSIS — I10 ESSENTIAL (PRIMARY) HYPERTENSION: ICD-10-CM

## 2024-08-18 NOTE — TELEPHONE ENCOUNTER
No care due was identified.  [facility] Embedded Care Due Messages. Reference number: [id number].   8/18/2024 3:43:00 PM CDT

## 2024-08-19 RX ORDER — METOPROLOL SUCCINATE 100 MG/1
100 TABLET, EXTENDED RELEASE ORAL DAILY
Qty: 90 TABLET | Refills: 3 | Status: SHIPPED | OUTPATIENT
Start: 2024-08-19 | End: 2025-08-19

## 2024-09-09 DIAGNOSIS — G89.29 CHRONIC MIDLINE LOW BACK PAIN WITH RIGHT-SIDED SCIATICA: ICD-10-CM

## 2024-09-09 DIAGNOSIS — M54.41 CHRONIC MIDLINE LOW BACK PAIN WITH RIGHT-SIDED SCIATICA: ICD-10-CM

## 2024-09-09 RX ORDER — OXYCODONE HYDROCHLORIDE AND ACETAMINOPHEN 5; 325 MG/1; MG/1
1 TABLET ORAL
COMMUNITY
Start: 2024-07-15 | End: 2024-09-11

## 2024-09-10 ENCOUNTER — PATIENT MESSAGE (OUTPATIENT)
Dept: ADMINISTRATIVE | Facility: HOSPITAL | Age: 74
End: 2024-09-10
Payer: MEDICARE

## 2024-09-10 NOTE — TELEPHONE ENCOUNTER
No care due was identified.  Health [facility] Embedded Care Due Messages. Reference number: [id number].   9/09/2024 9:21:06 PM CDT

## 2024-09-11 RX ORDER — OXYCODONE AND ACETAMINOPHEN 10; 325 MG/1; MG/1
1 TABLET ORAL EVERY 6 HOURS PRN
Qty: 120 TABLET | Refills: 0 | Status: SHIPPED | OUTPATIENT
Start: 2024-09-11 | End: 2024-10-11

## 2024-10-07 ENCOUNTER — PATIENT MESSAGE (OUTPATIENT)
Dept: FAMILY MEDICINE | Facility: CLINIC | Age: 74
End: 2024-10-07
Payer: MEDICARE

## 2024-10-07 DIAGNOSIS — M54.41 CHRONIC MIDLINE LOW BACK PAIN WITH RIGHT-SIDED SCIATICA: ICD-10-CM

## 2024-10-07 DIAGNOSIS — G89.29 CHRONIC MIDLINE LOW BACK PAIN WITH RIGHT-SIDED SCIATICA: ICD-10-CM

## 2024-10-07 RX ORDER — OXYCODONE AND ACETAMINOPHEN 10; 325 MG/1; MG/1
1 TABLET ORAL EVERY 6 HOURS PRN
Qty: 120 TABLET | Refills: 0 | Status: SHIPPED | OUTPATIENT
Start: 2024-10-07 | End: 2024-11-06

## 2024-10-07 NOTE — TELEPHONE ENCOUNTER
No care due was identified.  Health [facility] Embedded Care Due Messages. Reference number: [id number].   10/07/2024 11:41:07 AM CDT

## 2024-10-21 DIAGNOSIS — G47.01 INSOMNIA DUE TO MEDICAL CONDITION: ICD-10-CM

## 2024-10-21 NOTE — TELEPHONE ENCOUNTER
Care Due:                  Date            Visit Type   Department     Provider  --------------------------------------------------------------------------------                                EP -                              [facility]  Last Visit: 07-      CARE ([facility])   PRESTON Almaraz                               -                              [facility]  Next Visit: 10-      CARE ([facility])   MEDICINE       Zach Almaraz                                                            Last  Test          Frequency    Reason                     Performed    Due Date  --------------------------------------------------------------------------------    HBA1C.......  6 months...  empagliflozin,             07-   01-                             glimepiride..............    [facility] [employer] Embedded Care Due Messages. Reference number: [id number].   10/21/2024 9:13:41 AM CDT

## 2024-10-22 RX ORDER — ZOLPIDEM TARTRATE 10 MG/1
TABLET ORAL
Qty: 30 TABLET | Refills: 5 | Status: SHIPPED | OUTPATIENT
Start: 2024-10-22

## 2024-10-30 ENCOUNTER — LAB VISIT (OUTPATIENT)
Dept: LAB | Facility: HOSPITAL | Age: 74
End: 2024-10-30
Attending: INTERNAL MEDICINE
Payer: MEDICARE

## 2024-10-30 ENCOUNTER — OFFICE VISIT (OUTPATIENT)
Dept: FAMILY MEDICINE | Facility: CLINIC | Age: 74
End: 2024-10-30
Payer: MEDICARE

## 2024-10-30 VITALS
OXYGEN SATURATION: 95 % | DIASTOLIC BLOOD PRESSURE: 82 MMHG | BODY MASS INDEX: 36.1 KG/M2 | WEIGHT: 266.56 LBS | HEART RATE: 72 BPM | SYSTOLIC BLOOD PRESSURE: 112 MMHG | HEIGHT: 72 IN

## 2024-10-30 DIAGNOSIS — Z01.818 PRE-OP EVALUATION: ICD-10-CM

## 2024-10-30 DIAGNOSIS — E11.59 TYPE 2 DIABETES MELLITUS WITH OTHER CIRCULATORY COMPLICATION, WITHOUT LONG-TERM CURRENT USE OF INSULIN: ICD-10-CM

## 2024-10-30 DIAGNOSIS — E66.01 CLASS 2 SEVERE OBESITY DUE TO EXCESS CALORIES WITH SERIOUS COMORBIDITY AND BODY MASS INDEX (BMI) OF 36.0 TO 36.9 IN ADULT: ICD-10-CM

## 2024-10-30 DIAGNOSIS — E29.1 HYPOGONADISM IN MALE: ICD-10-CM

## 2024-10-30 DIAGNOSIS — G56.02 CARPAL TUNNEL SYNDROME OF LEFT WRIST: ICD-10-CM

## 2024-10-30 DIAGNOSIS — K21.9 GASTROESOPHAGEAL REFLUX DISEASE WITHOUT ESOPHAGITIS: ICD-10-CM

## 2024-10-30 DIAGNOSIS — G89.29 CHRONIC MIDLINE LOW BACK PAIN WITH RIGHT-SIDED SCIATICA: ICD-10-CM

## 2024-10-30 DIAGNOSIS — E78.2 MIXED HYPERLIPIDEMIA: ICD-10-CM

## 2024-10-30 DIAGNOSIS — Z00.00 WELLNESS EXAMINATION: Primary | ICD-10-CM

## 2024-10-30 DIAGNOSIS — Z23 NEED FOR IMMUNIZATION AGAINST INFLUENZA: ICD-10-CM

## 2024-10-30 DIAGNOSIS — M54.41 CHRONIC MIDLINE LOW BACK PAIN WITH RIGHT-SIDED SCIATICA: ICD-10-CM

## 2024-10-30 DIAGNOSIS — M54.16 LUMBAR RADICULOPATHY: ICD-10-CM

## 2024-10-30 DIAGNOSIS — I10 ESSENTIAL (PRIMARY) HYPERTENSION: ICD-10-CM

## 2024-10-30 DIAGNOSIS — E66.812 CLASS 2 SEVERE OBESITY DUE TO EXCESS CALORIES WITH SERIOUS COMORBIDITY AND BODY MASS INDEX (BMI) OF 36.0 TO 36.9 IN ADULT: ICD-10-CM

## 2024-10-30 DIAGNOSIS — I25.10 CORONARY ARTERY DISEASE INVOLVING NATIVE CORONARY ARTERY OF NATIVE HEART WITHOUT ANGINA PECTORIS: ICD-10-CM

## 2024-10-30 LAB
ALBUMIN SERPL BCP-MCNC: 4.1 G/DL (ref 3.5–5.2)
ALP SERPL-CCNC: 68 U/L (ref 40–150)
ALT SERPL W/O P-5'-P-CCNC: 12 U/L (ref 10–44)
ANION GAP SERPL CALC-SCNC: 12 MMOL/L (ref 8–16)
AST SERPL-CCNC: 21 U/L (ref 10–40)
BILIRUB SERPL-MCNC: 0.7 MG/DL (ref 0.1–1)
BUN SERPL-MCNC: 13 MG/DL (ref 8–23)
CALCIUM SERPL-MCNC: 9.4 MG/DL (ref 8.7–10.5)
CHLORIDE SERPL-SCNC: 103 MMOL/L (ref 95–110)
CO2 SERPL-SCNC: 25 MMOL/L (ref 23–29)
CREAT SERPL-MCNC: 1.4 MG/DL (ref 0.5–1.4)
EST. GFR  (NO RACE VARIABLE): 52.7 ML/MIN/1.73 M^2
ESTIMATED AVG GLUCOSE: 186 MG/DL (ref 68–131)
GLUCOSE SERPL-MCNC: 246 MG/DL (ref 70–110)
HBA1C MFR BLD: 8.1 % (ref 4–5.6)
POTASSIUM SERPL-SCNC: 4.7 MMOL/L (ref 3.5–5.1)
PROT SERPL-MCNC: 8 G/DL (ref 6–8.4)
SODIUM SERPL-SCNC: 140 MMOL/L (ref 136–145)
TESTOST SERPL-MCNC: 288 NG/DL (ref 304–1227)

## 2024-10-30 PROCEDURE — 80053 COMPREHEN METABOLIC PANEL: CPT | Performed by: INTERNAL MEDICINE

## 2024-10-30 PROCEDURE — 84403 ASSAY OF TOTAL TESTOSTERONE: CPT | Performed by: INTERNAL MEDICINE

## 2024-10-30 PROCEDURE — 99999 PR PBB SHADOW E&M-EST. PATIENT-LVL IV: CPT | Mod: PBBFAC,,, | Performed by: INTERNAL MEDICINE

## 2024-10-30 PROCEDURE — 83036 HEMOGLOBIN GLYCOSYLATED A1C: CPT | Performed by: INTERNAL MEDICINE

## 2024-10-30 RX ORDER — DULOXETIN HYDROCHLORIDE 30 MG/1
30 CAPSULE, DELAYED RELEASE ORAL DAILY
Qty: 90 CAPSULE | Refills: 3 | Status: SHIPPED | OUTPATIENT
Start: 2024-10-30 | End: 2025-10-30

## 2024-10-30 RX ORDER — OXYCODONE AND ACETAMINOPHEN 10; 325 MG/1; MG/1
1 TABLET ORAL EVERY 6 HOURS PRN
Qty: 120 TABLET | Refills: 0 | Status: SHIPPED | OUTPATIENT
Start: 2024-10-30 | End: 2024-11-02

## 2024-10-30 RX ORDER — FUROSEMIDE 40 MG/1
40 TABLET ORAL DAILY
Qty: 90 TABLET | Refills: 3 | Status: SHIPPED | OUTPATIENT
Start: 2024-10-30 | End: 2025-10-30

## 2024-10-30 RX ORDER — OMEPRAZOLE 40 MG/1
40 CAPSULE, DELAYED RELEASE ORAL DAILY
Qty: 90 CAPSULE | Refills: 3 | Status: SHIPPED | OUTPATIENT
Start: 2024-10-30 | End: 2025-10-30

## 2024-10-30 RX ORDER — TESTOSTERONE CYPIONATE 200 MG/ML
200 INJECTION, SOLUTION INTRAMUSCULAR
Qty: 10 ML | Refills: 0 | Status: SHIPPED | OUTPATIENT
Start: 2024-10-30

## 2024-10-30 RX ORDER — AMLODIPINE AND BENAZEPRIL HYDROCHLORIDE 10; 40 MG/1; MG/1
1 CAPSULE ORAL DAILY
Qty: 90 CAPSULE | Refills: 3 | Status: SHIPPED | OUTPATIENT
Start: 2024-10-30 | End: 2025-10-30

## 2024-11-01 ENCOUNTER — PATIENT MESSAGE (OUTPATIENT)
Dept: FAMILY MEDICINE | Facility: CLINIC | Age: 74
End: 2024-11-01
Payer: MEDICARE

## 2024-11-01 DIAGNOSIS — G89.29 CHRONIC MIDLINE LOW BACK PAIN WITH RIGHT-SIDED SCIATICA: Primary | ICD-10-CM

## 2024-11-01 DIAGNOSIS — M54.41 CHRONIC MIDLINE LOW BACK PAIN WITH RIGHT-SIDED SCIATICA: ICD-10-CM

## 2024-11-01 DIAGNOSIS — M54.41 CHRONIC MIDLINE LOW BACK PAIN WITH RIGHT-SIDED SCIATICA: Primary | ICD-10-CM

## 2024-11-01 DIAGNOSIS — G89.29 CHRONIC MIDLINE LOW BACK PAIN WITH RIGHT-SIDED SCIATICA: ICD-10-CM

## 2024-11-02 RX ORDER — HYDROCODONE BITARTRATE AND ACETAMINOPHEN 10; 325 MG/1; MG/1
1 TABLET ORAL EVERY 6 HOURS PRN
Qty: 12 TABLET | Refills: 0 | Status: SHIPPED | OUTPATIENT
Start: 2024-11-02 | End: 2024-11-05

## 2024-11-02 RX ORDER — OXYCODONE AND ACETAMINOPHEN 10; 325 MG/1; MG/1
1 TABLET ORAL EVERY 6 HOURS PRN
Qty: 120 TABLET | Refills: 0 | OUTPATIENT
Start: 2024-11-02 | End: 2024-12-02

## 2024-11-02 NOTE — TELEPHONE ENCOUNTER
No care due was identified.  [facility] Embedded Care Due Messages. Reference number: [id number].   11/01/2024 9:42:05 PM CDT

## 2024-11-04 ENCOUNTER — TELEPHONE (OUTPATIENT)
Dept: FAMILY MEDICINE | Facility: CLINIC | Age: 74
End: 2024-11-04
Payer: MEDICARE

## 2024-11-04 DIAGNOSIS — E11.59 TYPE 2 DIABETES MELLITUS WITH OTHER CIRCULATORY COMPLICATION, WITHOUT LONG-TERM CURRENT USE OF INSULIN: Primary | ICD-10-CM

## 2024-11-04 NOTE — TELEPHONE ENCOUNTER
----- Message from Aleyda sent at 11/1/2024 10:16 AM CDT -----  Type: Needs Medical Advice  Who Called:  pt wife     Best Call Back Number: [phone] kavin   Additional Information: pt wife requesting call back from office today 11/1 due to pt pharmacy not having prescription that was sent over please advise

## 2024-11-05 ENCOUNTER — PATIENT MESSAGE (OUTPATIENT)
Dept: FAMILY MEDICINE | Facility: CLINIC | Age: 74
End: 2024-11-05
Payer: MEDICARE

## 2024-11-05 RX ORDER — SEMAGLUTIDE 0.68 MG/ML
0.25 INJECTION, SOLUTION SUBCUTANEOUS
Qty: 3 ML | Refills: 3 | Status: SHIPPED | OUTPATIENT
Start: 2024-11-05

## 2024-11-29 ENCOUNTER — PATIENT MESSAGE (OUTPATIENT)
Dept: FAMILY MEDICINE | Facility: CLINIC | Age: 74
End: 2024-11-29
Payer: MEDICARE

## 2024-11-29 DIAGNOSIS — G89.29 CHRONIC MIDLINE LOW BACK PAIN WITH RIGHT-SIDED SCIATICA: Primary | ICD-10-CM

## 2024-11-29 DIAGNOSIS — M54.41 CHRONIC MIDLINE LOW BACK PAIN WITH RIGHT-SIDED SCIATICA: Primary | ICD-10-CM

## 2024-12-02 RX ORDER — OXYCODONE AND ACETAMINOPHEN 10; 325 MG/1; MG/1
1 TABLET ORAL EVERY 6 HOURS PRN
Qty: 120 TABLET | Refills: 0 | Status: SHIPPED | OUTPATIENT
Start: 2024-12-02 | End: 2025-01-01

## 2024-12-11 ENCOUNTER — OFFICE VISIT (OUTPATIENT)
Dept: FAMILY MEDICINE | Facility: CLINIC | Age: 74
End: 2024-12-11
Payer: MEDICARE

## 2024-12-11 VITALS
BODY MASS INDEX: 36.79 KG/M2 | WEIGHT: 271.63 LBS | SYSTOLIC BLOOD PRESSURE: 124 MMHG | DIASTOLIC BLOOD PRESSURE: 76 MMHG | HEART RATE: 68 BPM | OXYGEN SATURATION: 95 % | HEIGHT: 72 IN

## 2024-12-11 DIAGNOSIS — M54.41 CHRONIC MIDLINE LOW BACK PAIN WITH RIGHT-SIDED SCIATICA: ICD-10-CM

## 2024-12-11 DIAGNOSIS — E29.1 HYPOGONADISM IN MALE: ICD-10-CM

## 2024-12-11 DIAGNOSIS — E66.01 CLASS 2 SEVERE OBESITY DUE TO EXCESS CALORIES WITH SERIOUS COMORBIDITY AND BODY MASS INDEX (BMI) OF 36.0 TO 36.9 IN ADULT: ICD-10-CM

## 2024-12-11 DIAGNOSIS — G89.29 CHRONIC MIDLINE LOW BACK PAIN WITH RIGHT-SIDED SCIATICA: ICD-10-CM

## 2024-12-11 DIAGNOSIS — E78.2 MIXED HYPERLIPIDEMIA: ICD-10-CM

## 2024-12-11 DIAGNOSIS — M54.16 LUMBAR RADICULOPATHY: ICD-10-CM

## 2024-12-11 DIAGNOSIS — N52.1 ERECTILE DYSFUNCTION DUE TO DISEASES CLASSIFIED ELSEWHERE: ICD-10-CM

## 2024-12-11 DIAGNOSIS — E66.812 CLASS 2 SEVERE OBESITY DUE TO EXCESS CALORIES WITH SERIOUS COMORBIDITY AND BODY MASS INDEX (BMI) OF 36.0 TO 36.9 IN ADULT: ICD-10-CM

## 2024-12-11 DIAGNOSIS — N18.31 CHRONIC KIDNEY DISEASE, STAGE 3A: ICD-10-CM

## 2024-12-11 DIAGNOSIS — I10 ESSENTIAL (PRIMARY) HYPERTENSION: ICD-10-CM

## 2024-12-11 DIAGNOSIS — E11.59 TYPE 2 DIABETES MELLITUS WITH OTHER CIRCULATORY COMPLICATION, WITHOUT LONG-TERM CURRENT USE OF INSULIN: Primary | ICD-10-CM

## 2024-12-11 PROCEDURE — 1159F MED LIST DOCD IN RCRD: CPT | Mod: CPTII,S$GLB,, | Performed by: INTERNAL MEDICINE

## 2024-12-11 PROCEDURE — 3078F DIAST BP <80 MM HG: CPT | Mod: CPTII,S$GLB,, | Performed by: INTERNAL MEDICINE

## 2024-12-11 PROCEDURE — 3052F HG A1C>EQUAL 8.0%<EQUAL 9.0%: CPT | Mod: CPTII,S$GLB,, | Performed by: INTERNAL MEDICINE

## 2024-12-11 PROCEDURE — 3066F NEPHROPATHY DOC TX: CPT | Mod: CPTII,S$GLB,, | Performed by: INTERNAL MEDICINE

## 2024-12-11 PROCEDURE — 99214 OFFICE O/P EST MOD 30 MIN: CPT | Mod: S$GLB,,, | Performed by: INTERNAL MEDICINE

## 2024-12-11 PROCEDURE — G2211 COMPLEX E/M VISIT ADD ON: HCPCS | Mod: S$GLB,,, | Performed by: INTERNAL MEDICINE

## 2024-12-11 PROCEDURE — 3061F NEG MICROALBUMINURIA REV: CPT | Mod: CPTII,S$GLB,, | Performed by: INTERNAL MEDICINE

## 2024-12-11 PROCEDURE — 1160F RVW MEDS BY RX/DR IN RCRD: CPT | Mod: CPTII,S$GLB,, | Performed by: INTERNAL MEDICINE

## 2024-12-11 PROCEDURE — 1125F AMNT PAIN NOTED PAIN PRSNT: CPT | Mod: CPTII,S$GLB,, | Performed by: INTERNAL MEDICINE

## 2024-12-11 PROCEDURE — 3008F BODY MASS INDEX DOCD: CPT | Mod: CPTII,S$GLB,, | Performed by: INTERNAL MEDICINE

## 2024-12-11 PROCEDURE — 1101F PT FALLS ASSESS-DOCD LE1/YR: CPT | Mod: CPTII,S$GLB,, | Performed by: INTERNAL MEDICINE

## 2024-12-11 PROCEDURE — 99999 PR PBB SHADOW E&M-EST. PATIENT-LVL IV: CPT | Mod: PBBFAC,,, | Performed by: INTERNAL MEDICINE

## 2024-12-11 PROCEDURE — 4010F ACE/ARB THERAPY RXD/TAKEN: CPT | Mod: CPTII,S$GLB,, | Performed by: INTERNAL MEDICINE

## 2024-12-11 PROCEDURE — 3074F SYST BP LT 130 MM HG: CPT | Mod: CPTII,S$GLB,, | Performed by: INTERNAL MEDICINE

## 2024-12-11 PROCEDURE — 1157F ADVNC CARE PLAN IN RCRD: CPT | Mod: CPTII,S$GLB,, | Performed by: INTERNAL MEDICINE

## 2024-12-11 PROCEDURE — 3288F FALL RISK ASSESSMENT DOCD: CPT | Mod: CPTII,S$GLB,, | Performed by: INTERNAL MEDICINE

## 2024-12-11 RX ORDER — DULOXETIN HYDROCHLORIDE 60 MG/1
60 CAPSULE, DELAYED RELEASE ORAL DAILY
Qty: 90 CAPSULE | Refills: 3 | Status: SHIPPED | OUTPATIENT
Start: 2024-12-11 | End: 2025-12-11

## 2024-12-11 RX ORDER — LINAGLIPTIN 5 MG/1
5 TABLET, FILM COATED ORAL DAILY
Qty: 90 TABLET | Refills: 3 | Status: SHIPPED | OUTPATIENT
Start: 2024-12-11 | End: 2025-12-11

## 2024-12-11 RX ORDER — TADALAFIL 20 MG/1
20 TABLET ORAL DAILY
Qty: 10 TABLET | Refills: 11 | Status: SHIPPED | OUTPATIENT
Start: 2024-12-11 | End: 2025-12-11

## 2024-12-11 NOTE — PROGRESS NOTES
[facility]  Primary Care   [address]       Patient ID: William Manuel .     Chief Complaint:   Chief Complaint   Patient presents with    Follow-up     6 week         HPI:  Routine follow-up for diabetes that unfortunately he is uncontrolled with a hemoglobin A1c of 8.1 corresponding to an increase in his weight.  They did remove the fluid from his lap band so he is able to eat more.  He was thinking about getting it refilled but his wife who is in attendance related that if that happens he is going to have more troubles vomiting as food like he did in the past so going to have to allow him to make that decision.  In any case he is really only taking glimepiride in the morning as Jardiance was too much money and so was the Ozempic.  I am going to try and get him Tradjenta 5 mg a day to see if that can help modulate his diabetes as well.  His vital signs look good.  We started Cymbalta 30 mg per day a few months ago more as a way to help relieve his arthritis and neuropathy and it might be working.  He does look a lot more comfortable today but knows that he still has pain from his left carpal tunnel syndrome surgery site as well as his lower back.  With his permission I am going to increase his Cymbalta 60 mg a day and it might also help his chronic headaches though I think those maybe due to medication overuse headaches because he takes a good amount of oxycodone.  He had an episode of significant constipation that may have been an impaction that finally relieved itself with an enema so I would like him to keep that on hand at home in case he needs it and I also think the constipation was due to the narcotics.  We briefly talked about buprenorphine again and he requests 1 more month of oxycodone.  I would like him to take that month and discuss the medicine with his pharmacy to see if they can even get the medication and I what dose and let me know and I will come up with a  scheduling plan.  He does need a diabetic eye exam and I sent a referral in for that.  Sildenafil is not very effective in relieving his erectile dysfunction so we will try tadalafil instead.  Cholesterol has been very well-controlled for some time now so I will recheck that the next time we meet.  Kidney function has improved greatly many that he is not as dehydrated.  He probably does have a component of chronic kidney disease but we will monitor that for now.    Review of Systems       Lumbago     Objective:      Physical Exam   Physical Exam       Obese    Vitals:   Vitals:    12/11/24 1528   BP: 124/76   Pulse: 68   SpO2: 95%   Weight: 123.2 kg (271 lb 9.7 oz)   Height: 6' (1.829 m)        Assessment:           Plan:       William MCCONNELL Mar Rizvi  was seen today for follow-up and may need lab work.    Diagnoses and all orders for this visit:    William was seen today for follow-up.    Diagnoses and all orders for this visit:    Type 2 diabetes mellitus with other circulatory complication, without long-term current use of insulin  -     linaGLIPtin (TRADJENTA) 5 mg Tab tablet; Take 1 tablet (5 mg total) by mouth once daily.  -     Ambulatory referral/consult to Optometry; Future  Uncontrolled.  Continue glimepiride and monitor labs on Tradjenta    Chronic midline low back pain with right-sided sciatica  -     DULoxetine (CYMBALTA) 60 MG capsule; Take 1 capsule (60 mg total) by mouth once daily.  Increase Cymbalta to 60 mg a day to see if that can help with the back pain.  Continue oxycodone for now but I would like to at least try him on buprenorphine    Essential (primary) hypertension  Controlled with amlodipine/benazepril    Hypogonadism in male  Testosterone is still low but improving each time we check it    Class 2 severe obesity due to excess calories with serious comorbidity and body mass index (BMI) of 36.0 to 36.9 in adult  Monitor weight    Mixed hyperlipidemia  Controlled with rosuvastatin    Lumbar  radiculopathy  Monitor    Erectile dysfunction due to diseases classified elsewhere  -     tadalafiL (CIALIS) 20 MG Tab; Take 1 tablet (20 mg total) by mouth once daily.  Monitor on tadalafil    Chronic kidney disease, stage 3a  Monitor kidney function    Visit today included increased complexity associated with the care of the episodic problem diabetes hypertension hypogonadism obesity hyperlipidemia lumbago addressed and managing the longitudinal care of the patient due to the serious and/or complex managed problem(s) .       Labs ordered.       Zach Almaraz MD

## 2024-12-16 ENCOUNTER — PATIENT MESSAGE (OUTPATIENT)
Dept: OPHTHALMOLOGY | Facility: CLINIC | Age: 74
End: 2024-12-16
Payer: MEDICARE

## 2024-12-23 ENCOUNTER — PATIENT MESSAGE (OUTPATIENT)
Dept: FAMILY MEDICINE | Facility: CLINIC | Age: 74
End: 2024-12-23
Payer: MEDICARE

## 2024-12-23 DIAGNOSIS — G89.29 CHRONIC MIDLINE LOW BACK PAIN WITH RIGHT-SIDED SCIATICA: ICD-10-CM

## 2024-12-23 DIAGNOSIS — M54.41 CHRONIC MIDLINE LOW BACK PAIN WITH RIGHT-SIDED SCIATICA: ICD-10-CM

## 2024-12-24 RX ORDER — OXYCODONE AND ACETAMINOPHEN 10; 325 MG/1; MG/1
1 TABLET ORAL EVERY 6 HOURS PRN
Qty: 120 TABLET | Refills: 0 | Status: SHIPPED | OUTPATIENT
Start: 2024-12-24 | End: 2025-01-23

## 2024-12-24 NOTE — TELEPHONE ENCOUNTER
I know we discussed him in the past, but he seems ready for Buprenorphine and I could use some advice fore the dosing.

## 2024-12-27 NOTE — TELEPHONE ENCOUNTER
You'll have to explain why you want to switch him over... It's not wrong to try something different or even switch to another opioid with a lower total MME.

## 2024-12-28 NOTE — TELEPHONE ENCOUNTER
He's on chronic Percocet and it's really not working. I'm worried about him taking more and eventually unintentionally overdosing.

## 2025-01-20 ENCOUNTER — PATIENT MESSAGE (OUTPATIENT)
Dept: FAMILY MEDICINE | Facility: CLINIC | Age: 75
End: 2025-01-20
Payer: MEDICARE

## 2025-01-20 DIAGNOSIS — M54.41 CHRONIC MIDLINE LOW BACK PAIN WITH RIGHT-SIDED SCIATICA: ICD-10-CM

## 2025-01-20 DIAGNOSIS — G89.29 CHRONIC MIDLINE LOW BACK PAIN WITH RIGHT-SIDED SCIATICA: ICD-10-CM

## 2025-01-21 RX ORDER — OXYCODONE AND ACETAMINOPHEN 10; 325 MG/1; MG/1
1 TABLET ORAL EVERY 6 HOURS PRN
Qty: 120 TABLET | Refills: 0 | Status: SHIPPED | OUTPATIENT
Start: 2025-01-21 | End: 2025-01-21 | Stop reason: SDUPTHER

## 2025-01-21 RX ORDER — OXYCODONE AND ACETAMINOPHEN 10; 325 MG/1; MG/1
1 TABLET ORAL EVERY 6 HOURS PRN
Qty: 120 TABLET | Refills: 0 | Status: SHIPPED | OUTPATIENT
Start: 2025-01-21 | End: 2025-02-20

## 2025-01-30 DIAGNOSIS — Z00.00 ENCOUNTER FOR MEDICARE ANNUAL WELLNESS EXAM: ICD-10-CM

## 2025-01-31 NOTE — PROGRESS NOTES
Temporary STAC and PT/INR orders placed under Dr. Macias until patient establishes with Dr. Madden on 3/3/25.    This note was completed with dictation software and grammatical errors may exist.    Chief Complaint   Patient presents with    Low-back Pain        HPI: William Manuel Jr. is a 71 y.o. year old male patient who has a past medical history of Acute GI bleeding, Anticoagulant long-term use, Anxiety, Arthritis, BPH (benign prostatic hyperplasia), Claustrophobia, Coronary artery disease, Diabetes mellitus, GERD without esophagitis, H/O heart bypass surgery, History of kidney stones, Hypertension, Hypogonadism in male, Migraine headache, and VONNIE on CPAP. He presents in referral from No ref. provider found for back and right leg pain.  Today he is reporting continued low back pain, 9/10, located across his lower back, without significant radiation down either leg.  He previously had radiation down right leg which has resolved with the past month.  He is also reporting worsening neck pain with radiation down left arm.  He states his pain is constant, worsened with any activities.  He is not maintaining any at-home PT directed exercises but does ride a stationary bike twice daily.  He denies any associated numbness, weakness or any new changes to his bowel or bladder function.  At last office visit I ordered updated lumbar MRI and referred him to Neurosurgery however he was unable to get the MRI due to claustrophobia.      Prior HPI: William Manuel Jr. is a 72 y.o. year old male patient who has a past medical history of Acute GI bleeding, Anticoagulant long-term use, Anxiety, Arthritis, BPH (benign prostatic hyperplasia), Claustrophobia, Coronary artery disease, Diabetes mellitus, GERD without esophagitis, H/O heart bypass surgery, History of kidney stones, Hypertension, Hypogonadism in male, Migraine headache, and VONNIE on CPAP. He presents in referral from No ref. provider found for back and right leg pain.  The patient reports that over the last year he has had worsening back pain with pain radiating into his  right leg to the calf and foot.  It is worse with standing, walking, worse with doing any lifting, he gets some relief with medications.  He describes the pain as sharp and shooting.  He would undergone cardiac stent about 1 year ago and since that time has been on Effient and Xarelto, could not have any spine interventions since that time.  He reports that he has previously seen Dr. Jeffers, spine surgeon for his back but apparently had such severe cervical stenosis, he required surgery at C3 through C7 prior to any consideration for the lumbar spine.  Set after his cervical surgery, he has done well in respect to his neck and arms, balance but he also admits that he did not notice the symptoms from his cervical spine previously.  What bothers him the most is his constant low back pain and right leg pain.  The worse seems to be his back pain, this is provoked with standing and walking, somewhat improved with sitting down.  However he still even has pain with sitting.  He denies any yuliana weakness in the legs, bowel or bladder incontinence.  He believes that he had several block procedures in terms of medial branch blocks for his low back in the past but had not proceeded with radiofrequency ablation.  He states that he had 3 injections for blocks but that does not seem to make sense.        Pain intervention history: Previous cervical injections.  He previously had an injection in his lumbar spine in May, 2021, possibly with some relief.  He states that he had 3 injections for blocks but that does not seem to make sense, I suspect 1 of these may have been an epidural steroid injection. He is status post bilateral L3/4, L4/5 and L5/S1 radiofrequency ablation on 10/11/2022 with 0% relief. He is status post L5/S1 ELISA to left on 01/03/2023 with 25% relief for few days and now 0%.    Spine surgeries:  He underwent cervical fusion, C4-6 corpectomy C3-7 anterior fusion, with Dr. Jeffers in 11/2020.      Antineuropathics:  NSAIDs:    Physical therapy:  He underwent physical therapy this last year in  Antidepressants:  Muscle relaxers:  Opioids:  2 tablets of 10/325 Percocet 4 times daily  Antiplatelets/Anticoagulants:  Effient 10 mg, Xarelto        ROS:  He reports back pain only.  Balance of review of systems is negative.    Lab Results   Component Value Date    HGBA1C 7.0 (H) 01/26/2023       Lab Results   Component Value Date    WBC 8.28 01/26/2023    HGB 14.6 01/26/2023    HCT 42.9 01/26/2023    MCV 92 01/26/2023     01/26/2023             Past Medical History:   Diagnosis Date    Acute GI bleeding 2016    Anticoagulant long-term use     Anxiety     Arthritis     BPH (benign prostatic hyperplasia)     Claustrophobia     Coronary artery disease     Diabetes mellitus     GERD without esophagitis     H/O heart bypass surgery     History of kidney stones     Hypertension     Hypogonadism in male     Migraine headache     VONNIE on CPAP        Past Surgical History:   Procedure Laterality Date    APPENDECTOMY      CERVICAL SPINE SURGERY  11/2020    by Dr. Jeffers     COLONOSCOPY N/A 05/12/2016    Procedure: COLONOSCOPY;  Surgeon: Larry Ortega MD;  Location: [facility] ENDO;  Service: Endoscopy;  Laterality: N/A;    CORONARY ANGIOGRAPHY N/A 06/01/2019    Procedure: ANGIOGRAM, CORONARY ARTERY;  Surgeon: Mando Pickett MD;  Location: [facility] CATH;  Service: Cardiology;  Laterality: N/A;    CORONARY ANGIOPLASTY WITH STENT PLACEMENT      CORONARY ARTERY BYPASS GRAFT  2010    x 6    EPIDURAL STEROID INJECTION INTO LUMBAR SPINE N/A 1/3/2023    Procedure: Injection-steroid-epidural-lumbar L5/S1 to left;  Surgeon: Lukas Garcia MD;  Location: [facility];  Service: Pain Management;  Laterality: N/A;    EYE SURGERY      cataract w/iol right only    GASTRECTOMY      INSERTION OF IMPLANTABLE LOOP RECORDER      JOINT REPLACEMENT      knee replacement Bilateral     LAPAROSCOPIC GASTRIC BANDING  2013    LEFT HEART  CATHETERIZATION Left 06/01/2019    Procedure: CATHETERIZATION, HEART, LEFT;  Surgeon: Mando Pickett MD;  Location: [facility] CATH;  Service: Cardiology;  Laterality: Left;    RADIOFREQUENCY ABLATION OF LUMBAR MEDIAL BRANCH NERVE AT SINGLE LEVEL Bilateral 10/11/2022    Procedure: Radiofrequency Ablation, Nerve, Spinal, Lumbar, Medial Branch, L3/4, L4/5, and L5/S1;  Surgeon: Lukas Garcia MD;  Location: [facility] CSC;  Service: Pain Management;  Laterality: Bilateral;    REMOVAL OF IMPLANTABLE LOOP RECORDER N/A 09/04/2018    Procedure: REMOVAL, IMPLANTABLE LOOP RECORDER;  Surgeon: Vahe Singh III, MD;  Location: [facility] CATH;  Service: Cardiology;  Laterality: N/A;    STERNUM SEPARATION REPAIR  2011       Social History     Socioeconomic History    Marital status:    Occupational History    Occupation: retired-heavy equipment   Tobacco Use    Smoking status: Never    Smokeless tobacco: Never   Substance and Sexual Activity    Alcohol use: Not Currently    Drug use: No    Sexual activity: Yes     Partners: Female   Social History Narrative             Medications/Allergies: See med card    Vitals:    04/25/23 1524   BP: 128/73   Pulse: (!) 59   Weight: 116.8 kg (257 lb 8 oz)   Height: 6' (1.829 m)   PainSc:   9   PainLoc: Back     Body mass index is 34.92 kg/m².    Physical exam:  Gen: A and O x3, pleasant, well-groomed  Skin: No rashes or obvious lesions  HEENT: PERRLA, no obvious deformities on ears or in canals. Trachea midline.  CVS: Regular rate and rhythm, normal palpable pulses.  Resp:No increased work of breathing, symmetrical chest rise.  Abdomen: Soft, NT/ND.  Musculoskeletal:  Forward leaning gait , slow, steady      Neuro:    Iliopsoas Quadriceps Knee  Flexion Tibialis  anterior Gastro- cnemius EHL   Lower: R 5/5 5/5 5/5 5/5 5/5 5/5    L 5/5 5/5 5/5 5/5 5/5 5/5      Left  Right    Patellar DTR 0+ 2+   Achilles DTR 0+ 2+                    Intact and symmetrical to light touch and pinprick in  L1-S1 dermatomes bilaterally.    Lumbar spine:  Lumbar spine:  Range of motion significantly reduced with forward flexion, severely reduced with extension with increased pain in the back and right buttock.    Jimbo's test causes no increased pain on either side.    Supine straight leg raise is positive for right leg pain.   Internal and external rotation of the hip causes no increased pain on either side.  Myofascial exam:  Mild tenderness to palpation across lumbar paraspinous muscles.      Neuro:  Motor:    Right Left   C4 Shoulder Abduction  5  5   C5 Elbow Flexion    5  5   C6 Wrist Extension  5  5   C7 Elbow Extension   5-  5   C8/T1 Hand Intrinsics   5  5   C8 First Dorsal Interosseus  5  5   C8 Abductor Pollicus Brevis  5  5      Left  Right    Triceps DTR 0+ 0+   Biceps DTR 2+ 2+   Brachioradialis DTR 2+ 2+                              Sensory: Intact and symmetrical to light touch and pinprick in C2-T1 dermatomes bilaterally.  Cervical spine: ROM is full in flexion, extension and lateral rotation without increased pain.  Spurling's maneuver causes no neck pain to either side.  Myofascial exam: No Tenderness to palpation across cervical paraspinous region bilaterally.      Imagin21 MRI L-spine:  L2-L3: Bilateral facet degenerative change.   L3-L4: Minimal posterior disc bulge and bilateral facet degenerative change.  Minimal spinal canal stenosis.  Mild right neural foraminal narrowing.   L4-L5: Minimal posterior broad-based disc bulge with superimposed right paracentral disc protrusion.  Protruding segment measures 13 mm TV by 3 mm AP.  There is also disc fissure in the right paracentral region.  Bilateral facet degenerative change.  Mild spinal canal stenosis.  Narrowing of the right lateral recess.  Mild right neural foraminal narrowing.   L5-S1: Mild posterior disc bulge with superimposed left paracentral disc fissure.  Bilateral facet degenerative change moderate right and severe left neural  foraminal narrowing.       Assessment:  William Manuel Jr. is a 71 y.o. year old male patient who has a past medical history of Acute GI bleeding, Anticoagulant long-term use, Anxiety, Arthritis, BPH (benign prostatic hyperplasia), Claustrophobia, Coronary artery disease, Diabetes mellitus, GERD without esophagitis, H/O heart bypass surgery, Hypertension, Hypogonadism in male, Migraine headache, and VONNIE on CPAP. He presents in referral from Dr. Zach Almaraz for back and right leg pain.    1. Cervical radiculopathy        2. Cervicalgia  CT Cervical Spine Without Contrast    X-Ray Lumbar Complete Including Flex And Ext      3. Dorsalgia, unspecified  CT Lumbar Spine Without Contrast    X-Ray Cervical Spine 5 View With Flex And Ext      4. Lumbar radiculopathy        5. Lumbar spondylosis        6. S/P cervical spinal fusion              Plan:  1. Fortunately, his previous lumbar radicular pain has significantly improved.  I believe his remaining pain is likely axial facet mediated pain and myofascial pain.  However, I believe his lower back pain is out of proportion to his imaging and I am unsure why he is having such severe pain.  For his continued low back pain ordered updated x-ray today and CT lumbar spine.  Reports severe claustrophobia, I offered an MRI with IV sedation he declined and would prefer a CT.  We reviewed his lumbar MRI in clinic today.  2. I believe he is having worsening cervical radicular pain, he has previous cervical fusion.  I have ordered an updated CT cervical spine to further evaluate his neuro anatomy.  Based on imaging can potentially consider cervical epidural.  3. For his continued severe pain I would like him to be evaluated by Neurosurgery.  Previously mentioned spinal cord stimulator however at this time with out any significant radicular pain I am unsure how much this would benefit him.  4. He may be suffering from hyperalgesia as he is taking a significant amount of pain  medications daily this could be contributing to it.  5. We will call him with results of updated imaging and future treatment plan.      Thank you for referring this interesting patient, and I look forward to continuing to collaborate in his care.    The total time spent for evaluation and management on 04/25/2023 including reviewing separately obtained history, performing a medically appropriate exam and evaluation, documenting clinical information in the health record, independently interpreting results and communicating them to the patient/family/caregiver, and ordering medications/tests/procedures was between 40-54 minutes.

## 2025-02-10 ENCOUNTER — PATIENT MESSAGE (OUTPATIENT)
Dept: FAMILY MEDICINE | Facility: CLINIC | Age: 75
End: 2025-02-10
Payer: MEDICARE

## 2025-02-10 DIAGNOSIS — G89.29 CHRONIC MIDLINE LOW BACK PAIN WITH RIGHT-SIDED SCIATICA: ICD-10-CM

## 2025-02-10 DIAGNOSIS — M54.41 CHRONIC MIDLINE LOW BACK PAIN WITH RIGHT-SIDED SCIATICA: ICD-10-CM

## 2025-02-10 RX ORDER — OXYCODONE AND ACETAMINOPHEN 10; 325 MG/1; MG/1
1 TABLET ORAL EVERY 6 HOURS PRN
Qty: 120 TABLET | Refills: 0 | Status: SHIPPED | OUTPATIENT
Start: 2025-02-10 | End: 2025-03-12

## 2025-02-10 RX ORDER — BUPRENORPHINE HYDROCHLORIDE 8 MG/1
4 TABLET SUBLINGUAL 2 TIMES DAILY
Qty: 30 TABLET | Refills: 0 | Status: SHIPPED | OUTPATIENT
Start: 2025-02-10 | End: 2025-03-12

## 2025-02-11 ENCOUNTER — PATIENT MESSAGE (OUTPATIENT)
Dept: FAMILY MEDICINE | Facility: CLINIC | Age: 75
End: 2025-02-11
Payer: MEDICARE

## 2025-02-12 ENCOUNTER — PATIENT MESSAGE (OUTPATIENT)
Dept: FAMILY MEDICINE | Facility: CLINIC | Age: 75
End: 2025-02-12
Payer: MEDICARE

## 2025-03-06 ENCOUNTER — LAB VISIT (OUTPATIENT)
Dept: LAB | Facility: HOSPITAL | Age: 75
End: 2025-03-06
Attending: INTERNAL MEDICINE
Payer: MEDICARE

## 2025-03-06 DIAGNOSIS — E78.2 MIXED HYPERLIPIDEMIA: ICD-10-CM

## 2025-03-06 DIAGNOSIS — E29.1 HYPOGONADISM IN MALE: ICD-10-CM

## 2025-03-06 DIAGNOSIS — E11.59 TYPE 2 DIABETES MELLITUS WITH OTHER CIRCULATORY COMPLICATION, WITHOUT LONG-TERM CURRENT USE OF INSULIN: ICD-10-CM

## 2025-03-06 LAB
ALBUMIN SERPL BCP-MCNC: 3.9 G/DL (ref 3.5–5.2)
ALP SERPL-CCNC: 70 U/L (ref 40–150)
ALT SERPL W/O P-5'-P-CCNC: 16 U/L (ref 10–44)
ANION GAP SERPL CALC-SCNC: 8 MMOL/L (ref 8–16)
AST SERPL-CCNC: 30 U/L (ref 10–40)
BILIRUB SERPL-MCNC: 0.6 MG/DL (ref 0.1–1)
BUN SERPL-MCNC: 16 MG/DL (ref 8–23)
CALCIUM SERPL-MCNC: 8.7 MG/DL (ref 8.7–10.5)
CHLORIDE SERPL-SCNC: 104 MMOL/L (ref 95–110)
CHOLEST SERPL-MCNC: 190 MG/DL (ref 120–199)
CHOLEST/HDLC SERPL: 5.4 {RATIO} (ref 2–5)
CO2 SERPL-SCNC: 25 MMOL/L (ref 23–29)
CREAT SERPL-MCNC: 1.4 MG/DL (ref 0.5–1.4)
EST. GFR  (NO RACE VARIABLE): 52.4 ML/MIN/1.73 M^2
ESTIMATED AVG GLUCOSE: 206 MG/DL (ref 68–131)
GLUCOSE SERPL-MCNC: 208 MG/DL (ref 70–110)
HBA1C MFR BLD: 8.8 % (ref 4–5.6)
HDLC SERPL-MCNC: 35 MG/DL (ref 40–75)
HDLC SERPL: 18.4 % (ref 20–50)
LDLC SERPL CALC-MCNC: 114.4 MG/DL (ref 63–159)
NONHDLC SERPL-MCNC: 155 MG/DL
POTASSIUM SERPL-SCNC: 4 MMOL/L (ref 3.5–5.1)
PROT SERPL-MCNC: 7.8 G/DL (ref 6–8.4)
SODIUM SERPL-SCNC: 137 MMOL/L (ref 136–145)
TESTOST SERPL-MCNC: 379 NG/DL (ref 304–1227)
TRIGL SERPL-MCNC: 203 MG/DL (ref 30–150)

## 2025-03-06 PROCEDURE — 84403 ASSAY OF TOTAL TESTOSTERONE: CPT | Performed by: INTERNAL MEDICINE

## 2025-03-06 PROCEDURE — 80053 COMPREHEN METABOLIC PANEL: CPT | Performed by: INTERNAL MEDICINE

## 2025-03-06 PROCEDURE — 83036 HEMOGLOBIN GLYCOSYLATED A1C: CPT | Performed by: INTERNAL MEDICINE

## 2025-03-06 PROCEDURE — 80061 LIPID PANEL: CPT | Performed by: INTERNAL MEDICINE

## 2025-03-12 ENCOUNTER — HOSPITAL ENCOUNTER (OUTPATIENT)
Dept: RADIOLOGY | Facility: HOSPITAL | Age: 75
Discharge: HOME | End: 2025-03-12
Attending: INTERNAL MEDICINE
Payer: MEDICARE

## 2025-03-12 ENCOUNTER — OFFICE VISIT (OUTPATIENT)
Dept: FAMILY MEDICINE | Facility: CLINIC | Age: 75
End: 2025-03-12
Payer: MEDICARE

## 2025-03-12 VITALS
HEIGHT: 72 IN | HEART RATE: 82 BPM | OXYGEN SATURATION: 97 % | BODY MASS INDEX: 37.44 KG/M2 | WEIGHT: 276.44 LBS | SYSTOLIC BLOOD PRESSURE: 104 MMHG | DIASTOLIC BLOOD PRESSURE: 74 MMHG

## 2025-03-12 DIAGNOSIS — M79.602 LEFT ARM PAIN: ICD-10-CM

## 2025-03-12 DIAGNOSIS — Z01.818 PRE-OP EVALUATION: ICD-10-CM

## 2025-03-12 DIAGNOSIS — M54.41 CHRONIC MIDLINE LOW BACK PAIN WITH RIGHT-SIDED SCIATICA: ICD-10-CM

## 2025-03-12 DIAGNOSIS — E66.812 CLASS 2 SEVERE OBESITY DUE TO EXCESS CALORIES WITH SERIOUS COMORBIDITY AND BODY MASS INDEX (BMI) OF 37.0 TO 37.9 IN ADULT: ICD-10-CM

## 2025-03-12 DIAGNOSIS — I10 ESSENTIAL (PRIMARY) HYPERTENSION: Primary | ICD-10-CM

## 2025-03-12 DIAGNOSIS — E29.1 HYPOGONADISM IN MALE: ICD-10-CM

## 2025-03-12 DIAGNOSIS — E66.01 CLASS 2 SEVERE OBESITY DUE TO EXCESS CALORIES WITH SERIOUS COMORBIDITY AND BODY MASS INDEX (BMI) OF 37.0 TO 37.9 IN ADULT: ICD-10-CM

## 2025-03-12 DIAGNOSIS — E78.2 MIXED HYPERLIPIDEMIA: ICD-10-CM

## 2025-03-12 DIAGNOSIS — G89.29 CHRONIC MIDLINE LOW BACK PAIN WITH RIGHT-SIDED SCIATICA: ICD-10-CM

## 2025-03-12 DIAGNOSIS — Z86.711 HISTORY OF PULMONARY EMBOLISM: ICD-10-CM

## 2025-03-12 DIAGNOSIS — N18.31 CHRONIC KIDNEY DISEASE, STAGE 3A: ICD-10-CM

## 2025-03-12 DIAGNOSIS — E11.59 TYPE 2 DIABETES MELLITUS WITH OTHER CIRCULATORY COMPLICATION, WITHOUT LONG-TERM CURRENT USE OF INSULIN: ICD-10-CM

## 2025-03-12 PROCEDURE — 73130 X-RAY EXAM OF HAND: CPT | Mod: TC,FY,PO,LT

## 2025-03-12 PROCEDURE — 73080 X-RAY EXAM OF ELBOW: CPT | Mod: TC,FY,PO,LT

## 2025-03-12 PROCEDURE — 99999 PR PBB SHADOW E&M-EST. PATIENT-LVL III: CPT | Mod: PBBFAC,,, | Performed by: INTERNAL MEDICINE

## 2025-03-12 PROCEDURE — 73110 X-RAY EXAM OF WRIST: CPT | Mod: 26,LT,, | Performed by: RADIOLOGY

## 2025-03-12 PROCEDURE — 73030 X-RAY EXAM OF SHOULDER: CPT | Mod: TC,FY,PO,LT

## 2025-03-12 PROCEDURE — 73060 X-RAY EXAM OF HUMERUS: CPT | Mod: TC,FY,PO,LT

## 2025-03-12 PROCEDURE — 73030 X-RAY EXAM OF SHOULDER: CPT | Mod: 26,LT,, | Performed by: RADIOLOGY

## 2025-03-12 PROCEDURE — 73130 X-RAY EXAM OF HAND: CPT | Mod: 26,LT,, | Performed by: RADIOLOGY

## 2025-03-12 PROCEDURE — 73090 X-RAY EXAM OF FOREARM: CPT | Mod: 26,LT,, | Performed by: RADIOLOGY

## 2025-03-12 PROCEDURE — 73080 X-RAY EXAM OF ELBOW: CPT | Mod: 26,LT,, | Performed by: RADIOLOGY

## 2025-03-12 PROCEDURE — 73060 X-RAY EXAM OF HUMERUS: CPT | Mod: 26,LT,, | Performed by: RADIOLOGY

## 2025-03-12 PROCEDURE — 73110 X-RAY EXAM OF WRIST: CPT | Mod: TC,FY,PO,LT

## 2025-03-12 PROCEDURE — 73090 X-RAY EXAM OF FOREARM: CPT | Mod: TC,FY,PO,LT

## 2025-03-12 RX ORDER — AMLODIPINE AND BENAZEPRIL HYDROCHLORIDE 5; 20 MG/1; MG/1
1 CAPSULE ORAL DAILY
Qty: 90 CAPSULE | Refills: 3 | Status: SHIPPED | OUTPATIENT
Start: 2025-03-12 | End: 2026-03-12

## 2025-03-12 RX ORDER — OXYCODONE AND ACETAMINOPHEN 10; 325 MG/1; MG/1
1 TABLET ORAL EVERY 6 HOURS PRN
Qty: 120 TABLET | Refills: 0 | Status: SHIPPED | OUTPATIENT
Start: 2025-03-12 | End: 2025-04-11

## 2025-03-12 RX ORDER — LINAGLIPTIN 5 MG/1
5 TABLET, FILM COATED ORAL DAILY
Qty: 90 TABLET | Refills: 3 | Status: SHIPPED | OUTPATIENT
Start: 2025-03-12 | End: 2026-03-12

## 2025-03-12 NOTE — PROGRESS NOTES
[facility]  Primary Care   [address] [facility] [address]       Patient ID: William Manuel Jr.     Chief Complaint:   Chief Complaint   Patient presents with    Wellness     3 month follow up        HPI:  Patient had a mechanical fall 4 days ago while he was reaching down on the ground to  some beads during a parade.  He was able to reach down 1 time and get up but on the way down the 2nd time he lost his balance and fell on to his outstretched hands more so on the left side and since in his had a good amount of pain in his left hand and wrist and forearm and shoulder.  He had carpal tunnel release in that is side relatively recently and request x-rays of the arm to look for any fractures which I will get today.  I do wonder if his blood pressure is being overmedicated because it is fairly low today and I do wonder if his fall is really due to orthostatic hypotension, so I am going to cut his amlodipine/benazepril in half and since he is on Lasix I do want him to hydrate more with water including electrolytes.  He has not had any headaches or blurry vision or other mental status changes so I do not think he has a head bleed though he is taking Xarelto.  I offered him a head CT but he politely declined but if he ever changes his mind he can let me know.  He did try taking buprenorphine 1/2 of a 4 mg pill twice daily but after the 3rd day he had some significant mental status changes so he appropriately stopped it and went back to the Percocet.  I am listing that has an intolerance so we will continue with the Percocet for now.  Labs reviewed and his diabetes is very uncontrolled with an A1c of 8.8 in his cholesterol is high with an LDL of 114 but thankfully his testosterone looks good.  That has some kind of mix up because he seems to be taking glimepiride and glipizide which is redundant.  I have put him on Tradjenta at our last office visit but I do not think they got it so I resub[facility]  [facility].  I also do not think he is taking Zetia in his rosuvastatin because those come from his cardiologist and has been awhile since he saw them so I want them to reach out to the cardiologist in a if he does not refill his medicines I will.  We will plan to reconvene in a few months and hopefully will not have anymore accidents.    Review of Systems       Left Upper Extremity pain     Objective:      Physical Exam   Physical Exam       Obese   Decreased Range of Motion in Left Upper Extremity     Vitals:   Vitals:    03/12/25 1521   BP: 104/74   Pulse: 82   SpO2: 97%   Weight: 125.4 kg (276 lb 7.3 oz)   Height: 6' (1.829 m)        Assessment:           Plan:       William MCCONNELL Mar Rizvi  was seen today for follow-up and may need lab work.    Diagnoses and all orders for this visit:    William was seen today for wellness.    Diagnoses and all orders for this visit:    Essential (primary) hypertension  -     amlodipine-benazepril 5-20 mg (LOTREL) 5-20 mg per capsule; Take 1 capsule by mouth once daily.  Overmedicated, decrease amlodipine/benazepril to 5/20 mg and hydrate better with water and electrolytes    Left arm pain  -     X-Ray Hand Complete Left; Future  -     X-Ray Wrist Complete Left; Future  -     X-Ray Forearm Left; Future  -     X-Ray Elbow Complete Left; Future  -     X-Ray Humerus 2 View Left; Future  -     X-Ray Shoulder 2 or More Views Left; Future  X-rays today    Mixed hyperlipidemia  Uncontrolled.  Reach out to Cardiology to see if they will reorder Zetia and rosuvastatin and if not I will    Chronic kidney disease, stage 3a  Stable    History of pulmonary embolism  Continue Xarelto    Type 2 diabetes mellitus with other circulatory complication, without long-term current use of insulin  -     linaGLIPtin (TRADJENTA) 5 mg Tab tablet; Take 1 tablet (5 mg total) by mouth once daily.  Uncontrolled.  Continue glimepiride 4 mg in the morning and Tradjenta 5 mg in the  morning    Class 2 severe obesity due to excess calories with serious comorbidity and body mass index (BMI) of 37.0 to 37.9 in adult  Monitor    Hypogonadism in male  Controlled with testosterone    Chronic midline low back pain with right-sided sciatica  -     oxyCODONE-acetaminophen (PERCOCET)  mg per tablet; Take 1 tablet by mouth every 6 (six) hours as needed for Pain.  Intolerant to buprenorphine so continue Percocet    Visit today included increased complexity associated with the care of the episodic problem diabetes hypertension chronic kidney disease stage 3 hyperlipidemia back pain addressed and managing the longitudinal care of the patient due to the serious and/or complex managed problem(s) .           Zach Almaraz MD

## 2025-03-16 ENCOUNTER — RESULTS FOLLOW-UP (OUTPATIENT)
Dept: FAMILY MEDICINE | Facility: CLINIC | Age: 75
End: 2025-03-16

## 2025-03-27 DIAGNOSIS — G47.01 INSOMNIA DUE TO MEDICAL CONDITION: ICD-10-CM

## 2025-03-27 RX ORDER — ZOLPIDEM TARTRATE 10 MG/1
10 TABLET ORAL NIGHTLY PRN
Qty: 30 TABLET | Refills: 5 | Status: SHIPPED | OUTPATIENT
Start: 2025-03-27 | End: 2025-09-23

## 2025-03-27 NOTE — TELEPHONE ENCOUNTER
No care due was identified.  [facility] Embedded Care Due Messages. Reference number: [id number].   3/27/2025 8:13:57 AM CDT

## 2025-04-03 DIAGNOSIS — M54.41 CHRONIC MIDLINE LOW BACK PAIN WITH RIGHT-SIDED SCIATICA: ICD-10-CM

## 2025-04-03 DIAGNOSIS — N52.1 ERECTILE DYSFUNCTION DUE TO DISEASES CLASSIFIED ELSEWHERE: ICD-10-CM

## 2025-04-03 DIAGNOSIS — G89.29 CHRONIC MIDLINE LOW BACK PAIN WITH RIGHT-SIDED SCIATICA: ICD-10-CM

## 2025-04-03 DIAGNOSIS — N40.0 BENIGN PROSTATIC HYPERPLASIA WITHOUT LOWER URINARY TRACT SYMPTOMS: ICD-10-CM

## 2025-04-03 RX ORDER — TADALAFIL 20 MG/1
20 TABLET ORAL DAILY
Qty: 10 TABLET | Refills: 11 | Status: SHIPPED | OUTPATIENT
Start: 2025-04-03 | End: 2026-04-03

## 2025-04-03 RX ORDER — TAMSULOSIN HYDROCHLORIDE 0.4 MG/1
1 CAPSULE ORAL DAILY
Qty: 90 CAPSULE | Refills: 3 | Status: SHIPPED | OUTPATIENT
Start: 2025-04-03 | End: 2026-04-03

## 2025-04-03 RX ORDER — OXYCODONE AND ACETAMINOPHEN 10; 325 MG/1; MG/1
1 TABLET ORAL EVERY 6 HOURS PRN
Qty: 120 TABLET | Refills: 0 | Status: SHIPPED | OUTPATIENT
Start: 2025-04-03 | End: 2025-05-03

## 2025-04-03 NOTE — TELEPHONE ENCOUNTER
No care due was identified.  [facility] Embedded Care Due Messages. Reference number: [id number].   4/03/2025 1:39:42 PM CDT

## 2025-04-22 ENCOUNTER — NURSE TRIAGE (OUTPATIENT)
Dept: ADMINISTRATIVE | Facility: CLINIC | Age: 75
End: 2025-04-22
Payer: MEDICARE

## 2025-04-22 DIAGNOSIS — F41.9 ANXIETY: ICD-10-CM

## 2025-04-22 NOTE — TELEPHONE ENCOUNTER
No care due was identified.  [facility] Embedded Care Due Messages. Reference number: [id number].   4/22/2025 9:13:57 AM CDT

## 2025-04-22 NOTE — TELEPHONE ENCOUNTER
Pts wife calling with c/o with c/o thinking her  has bronchitis. Pt said that every now and then he said he feels like he can't catch his breath and then needs to catch up. Pt triaged and admits that he has wheezing and can be heard from across the room. Pt triaged and care advice to go to the ED now and O2 sat is 94 at present and told to go to nearest facility. Pt told to call back once home if any other questions or concerns worsening or any other help needed. Pt said hospital nearby is not [facility] but with the SOB to go to nearest.         Reason for Disposition   Wheezing can be heard across the room    Additional Information   Negative: SEVERE difficulty breathing (e.g., struggling for each breath, speaks in single words, pulse > 120)   Negative: Breathing stopped and hasn't returned   Negative: Choking on something   Negative: Bluish (or gray) lips or face   Negative: Difficult to awaken or acting confused (e.g., disoriented, slurred speech)   Negative: Passed out (e.g., fainted, lost consciousness, blacked out and was not responding)   Negative: Wheezing started suddenly after medicine, an allergic food, or bee sting   Negative: Stridor (harsh sound while breathing in)   Negative: Slow, shallow and weak breathing   Negative: Sounds like a life-threatening emergency to the triager   Negative: MODERATE difficulty breathing (e.g., speaks in phrases, SOB even at rest, pulse 100-120) of new-onset or worse than normal   Negative: Oxygen level (e.g., pulse oximetry) 90% or lower    Protocols used: Breathing Difficulty-A-OH

## 2025-04-24 RX ORDER — CLONAZEPAM 0.5 MG/1
0.5 TABLET ORAL 2 TIMES DAILY
Qty: 180 TABLET | Refills: 0 | Status: SHIPPED | OUTPATIENT
Start: 2025-04-24 | End: 2025-07-23

## 2025-04-28 DIAGNOSIS — G89.29 CHRONIC MIDLINE LOW BACK PAIN WITH RIGHT-SIDED SCIATICA: ICD-10-CM

## 2025-04-28 DIAGNOSIS — M54.41 CHRONIC MIDLINE LOW BACK PAIN WITH RIGHT-SIDED SCIATICA: ICD-10-CM

## 2025-04-28 NOTE — TELEPHONE ENCOUNTER
No care due was identified.  [facility] Embedded Care Due Messages. Reference number: [id number].   4/28/2025 9:35:48 AM CDT

## 2025-04-29 RX ORDER — OXYCODONE AND ACETAMINOPHEN 10; 325 MG/1; MG/1
1 TABLET ORAL EVERY 6 HOURS PRN
Qty: 120 TABLET | Refills: 0 | Status: SHIPPED | OUTPATIENT
Start: 2025-04-29 | End: 2025-05-29

## 2025-04-30 ENCOUNTER — PATIENT MESSAGE (OUTPATIENT)
Dept: FAMILY MEDICINE | Facility: CLINIC | Age: 75
End: 2025-04-30
Payer: MEDICARE

## 2025-04-30 DIAGNOSIS — I10 ESSENTIAL (PRIMARY) HYPERTENSION: ICD-10-CM

## 2025-04-30 NOTE — TELEPHONE ENCOUNTER
No care due was identified.  Health [facility] Embedded Care Due Messages. Reference number: [id number].   4/30/2025 3:20:47 PM CDT

## 2025-05-01 RX ORDER — FUROSEMIDE 40 MG/1
80 TABLET ORAL DAILY
Qty: 180 TABLET | Refills: 3 | Status: SHIPPED | OUTPATIENT
Start: 2025-05-01 | End: 2026-05-01

## 2025-05-11 DIAGNOSIS — E29.1 HYPOGONADISM IN MALE: ICD-10-CM

## 2025-05-11 DIAGNOSIS — I10 ESSENTIAL (PRIMARY) HYPERTENSION: ICD-10-CM

## 2025-05-11 DIAGNOSIS — K21.9 GASTROESOPHAGEAL REFLUX DISEASE WITHOUT ESOPHAGITIS: ICD-10-CM

## 2025-05-11 RX ORDER — TESTOSTERONE CYPIONATE 200 MG/ML
200 INJECTION, SOLUTION INTRAMUSCULAR
Qty: 10 ML | Refills: 0 | Status: SHIPPED | OUTPATIENT
Start: 2025-05-11

## 2025-05-11 NOTE — TELEPHONE ENCOUNTER
No care due was identified.  [facility] Embedded Care Due Messages. Reference number: [id number].   5/11/2025 6:20:51 PM CDT

## 2025-05-11 NOTE — TELEPHONE ENCOUNTER
No care due was identified.  [facility] Embedded Care Due Messages. Reference number: [id number].   5/11/2025 6:19:54 PM CDT

## 2025-05-11 NOTE — TELEPHONE ENCOUNTER
No care due was identified.  [facility] Embedded Care Due Messages. Reference number: [id number].   5/11/2025 6:18:56 PM CDT

## 2025-05-12 RX ORDER — OMEPRAZOLE 40 MG/1
40 CAPSULE, DELAYED RELEASE ORAL DAILY
Qty: 90 CAPSULE | Refills: 3 | Status: SHIPPED | OUTPATIENT
Start: 2025-05-12 | End: 2026-05-12

## 2025-05-12 RX ORDER — METOPROLOL SUCCINATE 100 MG/1
100 TABLET, EXTENDED RELEASE ORAL DAILY
Qty: 90 TABLET | Refills: 3 | Status: SHIPPED | OUTPATIENT
Start: 2025-05-12 | End: 2026-05-12

## 2025-05-12 NOTE — TELEPHONE ENCOUNTER
Refill Decision Note   William Chávezshelby  is requesting a refill authorization.  Brief Assessment and Rationale for Refill:  Approve     Medication Therapy Plan:         Comments:     Note composed:11:26 AM 05/12/2025

## 2025-05-12 NOTE — TELEPHONE ENCOUNTER
Refill Decision Note   William Manuel  is requesting a refill authorization.  Brief Assessment and Rationale for Refill:  Approve     Medication Therapy Plan:        Pharmacist review requested: Yes   Extended chart review required: Yes   Comments:     Note composed:11:52 AM 05/12/2025

## 2025-05-12 NOTE — TELEPHONE ENCOUNTER
Refill Routing Note   Medication(s) are not appropriate for processing by [facility] for the following reason(s):     DDI not previously overridden by current provider--after initial override, the Refill Center will be able to continue overrides      Drug-disease interaction: metoprolol succinate and Type 2 diabetes mellitus with circulatory disorder, without long-term current use of insulin     [facility] action(s):  Defer           Pharmacist review requested: Yes     Appointments  past 12m or future 3m with PCP    Date Provider   Last Visit   3/12/2025 Zach Almaraz MD   Next Visit   6/12/2025 Zach Almaraz MD   ED visits in past 90 days: 0        Note composed:11:27 AM 05/12/2025

## 2025-05-21 ENCOUNTER — TELEPHONE (OUTPATIENT)
Dept: FAMILY MEDICINE | Facility: CLINIC | Age: 75
End: 2025-05-21
Payer: MEDICARE

## 2025-05-21 NOTE — TELEPHONE ENCOUNTER
----- Message from Med Assistant Bennett sent at 5/20/2025  3:20 PM CDT -----  Contact: Patrick [phone]    ----- Message -----  From: Adilene Chavez  Sent: 5/20/2025  10:50 AM CDT  To: Sung Serrano Staff    Type:  Needs Medical AdviceWho Called: [facility] the [facility] rather a call back or a response via [facility]? Call backBest Call Back Number: Patrick [phone] Information: Patrick from [facility] wants to know if the pt is still taking: Tradjenta- 90 day supply from Dr. Almaraz. Last filled on March 12, 2025Duloxetine - filled Jan 11th , 2025 90 day supply Rofubastatioin- Why the pt was taken off this medication? Did he have a bad reaction?

## 2025-05-21 NOTE — TELEPHONE ENCOUNTER
According to my notes, he should be on Trajenta and Cymbalata, but I'm not sure why they are not on his active med list. He should also take Zetia and Rosuvastatin, but those are prescribed by Cardiology. If they will not order them, I can.

## 2025-05-22 ENCOUNTER — TELEPHONE (OUTPATIENT)
Dept: FAMILY MEDICINE | Facility: CLINIC | Age: 75
End: 2025-05-22
Payer: MEDICARE

## 2025-05-22 ENCOUNTER — PATIENT MESSAGE (OUTPATIENT)
Dept: FAMILY MEDICINE | Facility: CLINIC | Age: 75
End: 2025-05-22
Payer: MEDICARE

## 2025-05-22 DIAGNOSIS — M54.41 CHRONIC MIDLINE LOW BACK PAIN WITH RIGHT-SIDED SCIATICA: ICD-10-CM

## 2025-05-22 DIAGNOSIS — G89.29 CHRONIC MIDLINE LOW BACK PAIN WITH RIGHT-SIDED SCIATICA: ICD-10-CM

## 2025-05-22 NOTE — TELEPHONE ENCOUNTER
----- Message from Aleyda sent at 5/21/2025  4:33 PM CDT -----  Type:  Patient Returning CallWho Called:  pt wife Who Left Message for Patient:  nurseDoes the patient know what this is regarding?:  yesBest Call Back Number:  [phone] Information:  please advise

## 2025-05-22 NOTE — TELEPHONE ENCOUNTER
Spoke with Patrick at [facility]. Was able to give a list of patients current medications.

## 2025-05-22 NOTE — TELEPHONE ENCOUNTER
----- Message from Francisco Jefrain sent at 5/22/2025  9:17 AM CDT -----  Contact: @[facility] [phone]  Would like to receive medical advice.Would they like a call back or a response via [facility]:  call back Additional information:  Calling to speak with the office about the pts active meds. The caller states he left a message yesterday and hasn't heard anything

## 2025-05-23 NOTE — TELEPHONE ENCOUNTER
No care due was identified.  [facility] Embedded Care Due Messages. Reference number: [id number].   5/22/2025 7:21:07 PM CDT

## 2025-05-24 DIAGNOSIS — E29.1 HYPOGONADISM IN MALE: ICD-10-CM

## 2025-05-24 RX ORDER — TESTOSTERONE CYPIONATE 200 MG/ML
200 INJECTION, SOLUTION INTRAMUSCULAR
Qty: 2.5 ML | Refills: 5 | Status: SHIPPED | OUTPATIENT
Start: 2025-05-24

## 2025-05-24 RX ORDER — OXYCODONE AND ACETAMINOPHEN 10; 325 MG/1; MG/1
1 TABLET ORAL EVERY 6 HOURS PRN
Qty: 120 TABLET | Refills: 0 | Status: SHIPPED | OUTPATIENT
Start: 2025-05-24 | End: 2025-06-23

## 2025-05-30 ENCOUNTER — PATIENT MESSAGE (OUTPATIENT)
Dept: FAMILY MEDICINE | Facility: CLINIC | Age: 75
End: 2025-05-30
Payer: MEDICARE

## 2025-05-30 DIAGNOSIS — G47.33 OSA ON CPAP: Primary | ICD-10-CM

## 2025-06-03 ENCOUNTER — PATIENT MESSAGE (OUTPATIENT)
Dept: FAMILY MEDICINE | Facility: CLINIC | Age: 75
End: 2025-06-03
Payer: MEDICARE

## 2025-06-04 DIAGNOSIS — E11.59 TYPE 2 DIABETES MELLITUS WITH OTHER CIRCULATORY COMPLICATION, WITHOUT LONG-TERM CURRENT USE OF INSULIN: ICD-10-CM

## 2025-06-04 RX ORDER — GLIMEPIRIDE 4 MG/1
TABLET ORAL
Qty: 90 TABLET | Refills: 1 | Status: SHIPPED | OUTPATIENT
Start: 2025-06-04

## 2025-06-12 ENCOUNTER — OFFICE VISIT (OUTPATIENT)
Dept: FAMILY MEDICINE | Facility: CLINIC | Age: 75
End: 2025-06-12
Payer: MEDICARE

## 2025-06-12 VITALS
DIASTOLIC BLOOD PRESSURE: 80 MMHG | WEIGHT: 270 LBS | SYSTOLIC BLOOD PRESSURE: 132 MMHG | BODY MASS INDEX: 36.57 KG/M2 | OXYGEN SATURATION: 95 % | HEIGHT: 72 IN | RESPIRATION RATE: 20 BRPM | HEART RATE: 74 BPM

## 2025-06-12 DIAGNOSIS — I50.9 CHRONIC CONGESTIVE HEART FAILURE, UNSPECIFIED HEART FAILURE TYPE: ICD-10-CM

## 2025-06-12 DIAGNOSIS — N18.31 CHRONIC KIDNEY DISEASE, STAGE 3A: ICD-10-CM

## 2025-06-12 DIAGNOSIS — E11.59 TYPE 2 DIABETES MELLITUS WITH OTHER CIRCULATORY COMPLICATION, WITHOUT LONG-TERM CURRENT USE OF INSULIN: ICD-10-CM

## 2025-06-12 DIAGNOSIS — I49.9 CARDIAC ARRHYTHMIA, UNSPECIFIED CARDIAC ARRHYTHMIA TYPE: ICD-10-CM

## 2025-06-12 DIAGNOSIS — I10 ESSENTIAL (PRIMARY) HYPERTENSION: Primary | ICD-10-CM

## 2025-06-12 DIAGNOSIS — E78.2 MIXED HYPERLIPIDEMIA: ICD-10-CM

## 2025-06-12 PROCEDURE — 3079F DIAST BP 80-89 MM HG: CPT | Mod: CPTII,S$GLB,, | Performed by: INTERNAL MEDICINE

## 2025-06-12 PROCEDURE — 1159F MED LIST DOCD IN RCRD: CPT | Mod: CPTII,S$GLB,, | Performed by: INTERNAL MEDICINE

## 2025-06-12 PROCEDURE — 1126F AMNT PAIN NOTED NONE PRSNT: CPT | Mod: CPTII,S$GLB,, | Performed by: INTERNAL MEDICINE

## 2025-06-12 PROCEDURE — 1101F PT FALLS ASSESS-DOCD LE1/YR: CPT | Mod: CPTII,S$GLB,, | Performed by: INTERNAL MEDICINE

## 2025-06-12 PROCEDURE — 3075F SYST BP GE 130 - 139MM HG: CPT | Mod: CPTII,S$GLB,, | Performed by: INTERNAL MEDICINE

## 2025-06-12 PROCEDURE — 3288F FALL RISK ASSESSMENT DOCD: CPT | Mod: CPTII,S$GLB,, | Performed by: INTERNAL MEDICINE

## 2025-06-12 PROCEDURE — 99214 OFFICE O/P EST MOD 30 MIN: CPT | Mod: S$GLB,,, | Performed by: INTERNAL MEDICINE

## 2025-06-12 PROCEDURE — 99999 PR PBB SHADOW E&M-EST. PATIENT-LVL IV: CPT | Mod: PBBFAC,,, | Performed by: INTERNAL MEDICINE

## 2025-06-12 PROCEDURE — G2211 COMPLEX E/M VISIT ADD ON: HCPCS | Mod: S$GLB,,, | Performed by: INTERNAL MEDICINE

## 2025-06-12 PROCEDURE — 1157F ADVNC CARE PLAN IN RCRD: CPT | Mod: CPTII,S$GLB,, | Performed by: INTERNAL MEDICINE

## 2025-06-12 PROCEDURE — 1160F RVW MEDS BY RX/DR IN RCRD: CPT | Mod: CPTII,S$GLB,, | Performed by: INTERNAL MEDICINE

## 2025-06-12 PROCEDURE — 3052F HG A1C>EQUAL 8.0%<EQUAL 9.0%: CPT | Mod: CPTII,S$GLB,, | Performed by: INTERNAL MEDICINE

## 2025-06-12 RX ORDER — GLIPIZIDE 5 MG/1
5 TABLET, FILM COATED, EXTENDED RELEASE ORAL
COMMUNITY
End: 2025-06-12

## 2025-06-12 RX ORDER — DULOXETIN HYDROCHLORIDE 60 MG/1
60 CAPSULE, DELAYED RELEASE ORAL DAILY
COMMUNITY

## 2025-06-12 RX ORDER — SACUBITRIL AND VALSARTAN 24; 26 MG/1; MG/1
2 TABLET, FILM COATED ORAL DAILY
COMMUNITY

## 2025-06-12 RX ORDER — LINAGLIPTIN 5 MG/1
5 TABLET, FILM COATED ORAL DAILY
COMMUNITY

## 2025-06-12 NOTE — PROGRESS NOTES
[facility]  Primary Care   [address]       Patient ID: William Manuel Jr.     Chief Complaint:   Chief Complaint   Patient presents with    Follow-up        HPI:  Routine follow-up and since our last office visit he has been admitted to [facility] at least once for congestive heart failure.  Unfortunately, his cardiologist's has outside his system so I do not know the details of the hospitalizations but his wife gave me an updated medicine list and I can kind of piece things together.  It looks like the amlodipine/benazepril was stopped in favor of Entresto.  He is continuing Lasix.  Jardiance was added.  Today he seems euvolemic but a few days ago he did take a double dose of Lasix to prevent any heart failure exacerbations.  His particular symptoms are shortness a breath.  He does weigh himself daily.  He did have a Holter monitor after he was discharged and returned that that has not received word as to the results.  Prior to this, they were getting ready for a surgery near his right IH remove a basal cell carcinoma but those plan has been put on hold while they sort out his heart.  I can only assume that is trying to figure out if he is going into an arrhythmia causing the heart failure exacerbations.  He is also on Eliquis from what I can assume is a suspected arrhythmia but he does not have a diagnosis yet.  His cardiologist stopped his Lodine because it does have a few interactions with the Entresto.  We will have to keep him on the oxycodone for the arthritis pains.    Review of Systems       Negative    Objective:      Physical Exam   Physical Exam       Obese    Vitals:   Vitals:    06/12/25 1443   BP: 132/80   Pulse: 74   Resp: 20   SpO2: 95%   Weight: 122.5 kg (270 lb)  Comment: Per pt.   Height: 6' (1.829 m)        Assessment:           Plan:       William Manuel Jr.  was seen today for follow-up and may need lab work.    Diagnoses and all  orders for this visit:    William was seen today for follow-up.    Diagnoses and all orders for this visit:    Essential (primary) hypertension  Controlled with the Entresto and metoprolol    Cardiac arrhythmia, unspecified cardiac arrhythmia type  Unknown cardiac rhythm in this time and hopefully his Holter will be read sometime soon.  Continue Eliquis for now    Mixed hyperlipidemia  Monitor on rosuvastatin and Zetia    Chronic kidney disease, stage 3a  Monitor labs    Type 2 diabetes mellitus with other circulatory complication, without long-term current use of insulin  Monitor labs soon and will continue the glimepiride, Tradjenta, and Januvia.  He was taking glipizide as well but we are stopping that    Chronic congestive heart failure, unspecified heart failure type  Seems euvolemic with Lasix at this time    Visit today included increased complexity associated with the care of the episodic problem since that has some heart failure diabetes hyperlipidemia hypertension addressed and managing the longitudinal care of the patient due to the serious and/or complex managed problem(s) .           Zach Almaraz MD

## 2025-06-19 DIAGNOSIS — G89.29 CHRONIC MIDLINE LOW BACK PAIN WITH RIGHT-SIDED SCIATICA: ICD-10-CM

## 2025-06-19 DIAGNOSIS — M54.41 CHRONIC MIDLINE LOW BACK PAIN WITH RIGHT-SIDED SCIATICA: ICD-10-CM

## 2025-06-19 NOTE — TELEPHONE ENCOUNTER
Care Due:                  Date            Visit Type   Department     Provider  --------------------------------------------------------------------------------                                EP -                              [facility]  Last Visit: 06-      CARE ([facility])   PRESTON Almaraz                               -                              [facility]  Next Visit: 09-      CARE ([facility])   MEDICINE       Zach Almaraz                                                            Last  Test          Frequency    Reason                     Performed    Due Date  --------------------------------------------------------------------------------    HBA1C.......  6 months...  glimepiride..............  03- 09-    [facility] Embedded Care Due Messages. Reference number: [id number].   6/19/2025 2:05:36 PM CDT

## 2025-06-20 RX ORDER — OXYCODONE AND ACETAMINOPHEN 10; 325 MG/1; MG/1
1 TABLET ORAL EVERY 6 HOURS PRN
Qty: 120 TABLET | Refills: 0 | Status: SHIPPED | OUTPATIENT
Start: 2025-06-20 | End: 2025-07-20

## 2025-07-09 ENCOUNTER — TELEPHONE (OUTPATIENT)
Dept: DERMATOLOGY | Facility: CLINIC | Age: 75
End: 2025-07-09
Payer: MEDICARE

## 2025-07-16 ENCOUNTER — LAB VISIT (OUTPATIENT)
Dept: LAB | Facility: HOSPITAL | Age: 75
End: 2025-07-16
Attending: INTERNAL MEDICINE
Payer: MEDICARE

## 2025-07-16 DIAGNOSIS — E11.59 TYPE 2 DIABETES MELLITUS WITH OTHER CIRCULATORY COMPLICATION, WITHOUT LONG-TERM CURRENT USE OF INSULIN: ICD-10-CM

## 2025-07-16 DIAGNOSIS — I10 ESSENTIAL (PRIMARY) HYPERTENSION: ICD-10-CM

## 2025-07-16 LAB
ABSOLUTE EOSINOPHIL (OHS): 0.5 K/UL
ABSOLUTE MONOCYTE (OHS): 1.17 K/UL (ref 0.3–1)
ABSOLUTE NEUTROPHIL COUNT (OHS): 5.9 K/UL (ref 1.8–7.7)
ALBUMIN/CREAT UR: 23.3 UG/MG
BASOPHILS # BLD AUTO: 0.16 K/UL
BASOPHILS NFR BLD AUTO: 1.6 %
CHOLEST SERPL-MCNC: 189 MG/DL (ref 120–199)
CHOLEST/HDLC SERPL: 6.3 {RATIO} (ref 2–5)
CREAT UR-MCNC: 90 MG/DL (ref 23–375)
EAG (OHS): 194 MG/DL (ref 68–131)
ERYTHROCYTE [DISTWIDTH] IN BLOOD BY AUTOMATED COUNT: 18.5 % (ref 11.5–14.5)
HBA1C MFR BLD: 8.4 % (ref 4–5.6)
HCT VFR BLD AUTO: 56.4 % (ref 40–54)
HDLC SERPL-MCNC: 30 MG/DL (ref 40–75)
HDLC SERPL: 15.9 % (ref 20–50)
HGB BLD-MCNC: 15.8 GM/DL (ref 14–18)
IMM GRANULOCYTES # BLD AUTO: 0.05 K/UL (ref 0–0.04)
IMM GRANULOCYTES NFR BLD AUTO: 0.5 % (ref 0–0.5)
LDLC SERPL CALC-MCNC: 96.6 MG/DL (ref 63–159)
LYMPHOCYTES # BLD AUTO: 2.37 K/UL (ref 1–4.8)
MCH RBC QN AUTO: 22.4 PG (ref 27–31)
MCHC RBC AUTO-ENTMCNC: 28 G/DL (ref 32–36)
MCV RBC AUTO: 80 FL (ref 82–98)
MICROALBUMIN UR-MCNC: 21 UG/ML (ref ?–5000)
NONHDLC SERPL-MCNC: 159 MG/DL
NUCLEATED RBC (/100WBC) (OHS): 0 /100 WBC
PLATELET # BLD AUTO: 226 K/UL (ref 150–450)
PMV BLD AUTO: 11 FL (ref 9.2–12.9)
RBC # BLD AUTO: 7.04 M/UL (ref 4.6–6.2)
RELATIVE EOSINOPHIL (OHS): 4.9 %
RELATIVE LYMPHOCYTE (OHS): 23.3 % (ref 18–48)
RELATIVE MONOCYTE (OHS): 11.5 % (ref 4–15)
RELATIVE NEUTROPHIL (OHS): 58.2 % (ref 38–73)
TRIGL SERPL-MCNC: 312 MG/DL (ref 30–150)
WBC # BLD AUTO: 10.15 K/UL (ref 3.9–12.7)

## 2025-07-16 PROCEDURE — 80061 LIPID PANEL: CPT

## 2025-07-16 PROCEDURE — 83036 HEMOGLOBIN GLYCOSYLATED A1C: CPT

## 2025-07-16 PROCEDURE — 36415 COLL VENOUS BLD VENIPUNCTURE: CPT | Mod: PO

## 2025-07-16 PROCEDURE — 85025 COMPLETE CBC W/AUTO DIFF WBC: CPT

## 2025-07-16 PROCEDURE — 82043 UR ALBUMIN QUANTITATIVE: CPT

## 2025-07-21 DIAGNOSIS — M54.41 CHRONIC MIDLINE LOW BACK PAIN WITH RIGHT-SIDED SCIATICA: ICD-10-CM

## 2025-07-21 DIAGNOSIS — G89.29 CHRONIC MIDLINE LOW BACK PAIN WITH RIGHT-SIDED SCIATICA: ICD-10-CM

## 2025-07-22 DIAGNOSIS — C44.319 BASAL CELL CARCINOMA (BCC) OF RIGHT TEMPLE REGION: Primary | ICD-10-CM

## 2025-07-22 RX ORDER — OXYCODONE AND ACETAMINOPHEN 10; 325 MG/1; MG/1
1 TABLET ORAL EVERY 6 HOURS PRN
Qty: 120 TABLET | Refills: 0 | Status: SHIPPED | OUTPATIENT
Start: 2025-07-22 | End: 2025-08-21

## 2025-07-30 ENCOUNTER — PROCEDURE VISIT (OUTPATIENT)
Dept: DERMATOLOGY | Facility: CLINIC | Age: 75
End: 2025-07-30
Payer: MEDICARE

## 2025-07-30 VITALS
SYSTOLIC BLOOD PRESSURE: 154 MMHG | HEART RATE: 70 BPM | HEIGHT: 72 IN | DIASTOLIC BLOOD PRESSURE: 89 MMHG | BODY MASS INDEX: 36.58 KG/M2 | WEIGHT: 270.06 LBS

## 2025-07-30 DIAGNOSIS — C44.319 BASAL CELL CARCINOMA (BCC) OF RIGHT TEMPLE REGION: ICD-10-CM

## 2025-07-30 PROCEDURE — 13132 CMPLX RPR F/C/C/M/N/AX/G/H/F: CPT | Mod: S$GLB,,, | Performed by: DERMATOLOGY

## 2025-07-30 PROCEDURE — 17311 MOHS 1 STAGE H/N/HF/G: CPT | Mod: 51,S$GLB,, | Performed by: DERMATOLOGY

## 2025-07-30 NOTE — LETTER
August 7, 2025        Cuong Albarran MD  [address]  [facility]  [facility] [id number]     [facility] - Dermatology Surgery  [address] [place] [address]  Phone: [phone]  Fax: [phone]   Patient: William Manuel Jr.   MR Number: [id number]   YOB: 1950   Date of Visit: 7/30/2025     Dear Dr. Albarran.     William Manuel Jr. was seen at our office today for Mohs Micrographic surgery of the basal cell carcinoma - nodular and infiltrative on the right temple. The tumor was extirpated in 1 stages leaving a final defect of 1.9 x 0.9 cm. After discussion of the possible repair options and in order to achieve an excellent cosmetic and functional result, the wound was repaired with a(n) linear closure.           The patient will return to our clinic in 7 days for suture removal/wound check.   They will then be returned fully to your care pending any further need for our services. Thank you for allowing us to participate in the care of this patient.      Allyn Desir MD FAAD

## 2025-07-30 NOTE — LETTER
July 30, 2025   Dear Heriberto Mondragoninshelby Jonas was seen at our office today for Mohs Micrographic surgery of the basal cell carcinoma  and basal cell carcinoma - nodular and infiltrative on the right temple. The tumor was extirpated in 1 stages leaving a final defect of 1.2 x 0.9 cm. After discussion of the possible repair options and in order to achieve an excellent cosmetic and functional result, the wound was repaired with a(n) linear closure.            The patient will return to our clinic in 7 days for suture removal/wound check.   They will then be returned fully to your care pending any further need for our services. Thank you for allowing us to participate in the care of this patient.      Allyn Desir MD FAAD

## 2025-07-30 NOTE — PROGRESS NOTES
MOHS MICROGRAPHIC SURGERY OPERATIVE NOTE  Name:  William Manuel Jr.  Date: 2025  Patient : 1950  Attending Surgeon: Allyn Desir MD  Assistants: Sima Oneill RN   Anesthetic Agent: 1% lidocaine with 1:100,000 epinephrine  Clinical Diagnosis: basal cell carcinoma - nodular and infiltrative  Operation: Mohs Micrographic Surgery  Location: right temple  Indications: Location in mask areas of face including central face, nose, eyelids, eyebrows, lips, chin, preauricular, temple, and ear.  Surgical Preparation: povidone-iodine  Pre-op anbitioics: no  MOHS Case #: [facility]      Description of Operation:  The nature and purpose of the procedure, associated risks and alternative treatments were explained to the patient in detail. All patient questions were answered completely.  An informed operative consent and photography permit were obtained. The tumor location was then identified and marked with agreement by the patient of the correct location.   Time out was performed with all present parties agreeing to two patient identifiers and the procedure location.  The patient was positioned, prepped, and draped in the usual sterile manner.  Local anesthesia was obtained with 3 cc(s) of 1% lidocaine with 1:100,000 epinephrine. The lesion pre-operatively measures 1.0 by 0.7 cm.     1ST STAGE:  A 2+ mm rim of normal appearing skin was marked circumferentially around the lesion after scraping with a curette to define the margin.   The area thus outlined was excised at a 45 degree angle. Hemostasis was obtained with electrodesiccation.   The specimen was oriented, mapped, and subdivided into at least two sections for evaluation.  Sections were then chromacoded and submitted for horizontal frozen sections.   The patient tolerated the procedure well and there were no complications.   Upon microscopic examination of the processed horizontal frozen sections of this stage:  No residual tumor was  noted.      SUMMARY:  The tumor was extirpated in 1 Mohs Stages resulting in a final defect measuring 1.2 by 0.9 cm².   The final defect extended deep to subcutaneous fat  The patient tolerated the procedure well and no complications were noted.     DEFECT PHOTO:        Allyn Desir MD      Complex Linear Closure Operative Note  Name: William Manuel Jr.  YOB: 1950  Date: 7/30/2025  Attending Surgeon: Allyn Desir MD, FAAD  Assistants: Sima Oneill RN   Defect Size: 1.2 x 0.9 cm secondary to Mohs Micrographic Surgery  Location: right temple  Operation: Complex Linear Closure  Surgical Preparation: Broad scrub with povidone-iodine    Description of Operation:    The procedure, associated risks, and alternative treatments were discussed in detail with the patient, and all questions were answered. To optimize wound healing, minimize tension, and maintain cosmetic and functional integrity, a complex linear closure was performed. Informed consent and a photography permit were obtained.  The patient was positioned, prepped, and draped in a sterile manner. Local anesthesia was achieved using 6 cc(s) of 1% lidocaine with 1:100,000 epinephrine.    The defect edges were debeveled with a scalpel blade. The circular defect was widely undermined in the deep subcutaneous plane at least 100% of the defect diameter to allow for maximum tissue movement. Hemostasis was achieved with spot electrodessication. The defect was closed first utilizing multiple 5-0 Monocryl interrupted buried subcutaneous sutures. This resulted in excellent apposition of the subcutis and the superficial fascia, two redundant areas of tissue were created on opposite sides of the defect by the primary closure. Utilizing a scalpel blade, two Burows triangles were excised to ensure a flat scar. Hemostasis was obtained with spot electrodessication. The epidermal edges throughout were further fastened superficially with 6-0  Prolene. The final length of the repair was 4.2 cm. The patient tolerated the procedure well and there were no complications.    The patient tolerated the procedure well with no complications.    Post-Procedure Care:  Dressing: Polysporin, non-adherent gauze and a pressure dressing were applied to wound after gentle cleansing with saline.  Post-Op Medications: None  Follow-Up: The patient was provided with detailed post-op care instructions and will return in 7 days for suture removal or wound check.     Photo(s):        Allyn Desir MD.

## 2025-08-07 ENCOUNTER — CLINICAL SUPPORT (OUTPATIENT)
Dept: DERMATOLOGY | Facility: CLINIC | Age: 75
End: 2025-08-07
Payer: MEDICARE

## 2025-08-07 DIAGNOSIS — Z48.02 VISIT FOR SUTURE REMOVAL: Primary | ICD-10-CM

## 2025-08-07 NOTE — PROGRESS NOTES
Suture Removal Note   Patient Name: William Manuel Jr.  Patient : 1950  Date of Service: 2025    CC: Pt. Presents for removal of sutures on the Right temple today  Subjective: patient reports wound healing as expected     Objective: Wound well healed, sutures clean/dry/intact. No evidence of any complications noted.   Photo:     Visit For Suture Removal:  - Suture removal today  - Steri strips/mastisol were not applied  - Patient counseled on silicone gel/silicone sheeting and their use in the management of post-operative scars  - Handout on silicone gel/silicone gel sheeting was provided  - Patient to follow up with their referring provider in 3 months for further skin evaluation    Caitie Disla

## 2025-08-12 DIAGNOSIS — M54.41 CHRONIC MIDLINE LOW BACK PAIN WITH RIGHT-SIDED SCIATICA: ICD-10-CM

## 2025-08-12 DIAGNOSIS — G89.29 CHRONIC MIDLINE LOW BACK PAIN WITH RIGHT-SIDED SCIATICA: ICD-10-CM

## 2025-08-13 RX ORDER — OXYCODONE AND ACETAMINOPHEN 10; 325 MG/1; MG/1
1 TABLET ORAL EVERY 6 HOURS PRN
Qty: 120 TABLET | Refills: 0 | Status: SHIPPED | OUTPATIENT
Start: 2025-08-13 | End: 2025-09-12

## 2025-08-18 RX ORDER — EMPAGLIFLOZIN 10 MG/1
10 TABLET, FILM COATED ORAL
Qty: 30 TABLET | Refills: 0 | OUTPATIENT
Start: 2025-08-18

## 2025-09-03 DIAGNOSIS — G47.01 INSOMNIA DUE TO MEDICAL CONDITION: ICD-10-CM

## 2025-09-03 DIAGNOSIS — G89.29 CHRONIC MIDLINE LOW BACK PAIN WITH RIGHT-SIDED SCIATICA: ICD-10-CM

## 2025-09-03 DIAGNOSIS — M54.41 CHRONIC MIDLINE LOW BACK PAIN WITH RIGHT-SIDED SCIATICA: ICD-10-CM

## 2025-09-04 RX ORDER — OXYCODONE AND ACETAMINOPHEN 10; 325 MG/1; MG/1
1 TABLET ORAL EVERY 6 HOURS PRN
Qty: 120 TABLET | Refills: 0 | Status: SHIPPED | OUTPATIENT
Start: 2025-09-04 | End: 2025-10-04

## 2025-09-04 RX ORDER — ZOLPIDEM TARTRATE 10 MG/1
10 TABLET ORAL NIGHTLY PRN
Qty: 30 TABLET | Refills: 5 | Status: SHIPPED | OUTPATIENT
Start: 2025-09-04